# Patient Record
Sex: MALE | Race: WHITE | Employment: OTHER | URBAN - METROPOLITAN AREA
[De-identification: names, ages, dates, MRNs, and addresses within clinical notes are randomized per-mention and may not be internally consistent; named-entity substitution may affect disease eponyms.]

---

## 2018-12-13 ENCOUNTER — OFFICE VISIT (OUTPATIENT)
Dept: ORTHOPEDIC SURGERY | Age: 78
End: 2018-12-13
Payer: MEDICARE

## 2018-12-13 VITALS — HEIGHT: 72 IN | BODY MASS INDEX: 26.41 KG/M2 | WEIGHT: 195 LBS

## 2018-12-13 DIAGNOSIS — M17.12 ARTHRITIS OF LEFT KNEE: ICD-10-CM

## 2018-12-13 DIAGNOSIS — M25.562 LEFT KNEE PAIN, UNSPECIFIED CHRONICITY: Primary | ICD-10-CM

## 2018-12-13 PROCEDURE — L3170 FOOT PLAS HEEL STABI PRE OTS: HCPCS | Performed by: ORTHOPAEDIC SURGERY

## 2018-12-13 PROCEDURE — 99203 OFFICE O/P NEW LOW 30 MIN: CPT | Performed by: ORTHOPAEDIC SURGERY

## 2018-12-13 NOTE — PROGRESS NOTES
MD Phill Massey PA-C         Orthopaedic Surgery and Sports Medicine        Chief Complaint:  Knee Pain (left knee)      History of Present Illness:  Junnie Kussmaul is a 70 y.o. male here regarding left knee pain. The pain is located medial.   Patient feels this discomfort may be related to a known injury No    The patient describes the symptoms as aching and sharp. Symptoms improve with rest.     The symptoms are worse with activity, stair climbing, kneeling, deep knee bending, getting up from a chair, weight bearing. Discomfort has been progressive over time. Sleeping habits related to chief complaint:No    Outside reports reviewed: none. Medical History:  Patient's medications, allergies, past medical, surgical, social and family histories were reviewed and updated as appropriate. Past Medical History:   Diagnosis Date    Hypertension        Past Surgical History:   Procedure Laterality Date    COLONOSCOPY      COLONOSCOPY  10/24/2013       No family history on file. Social History     Social History    Marital status:      Spouse name: N/A    Number of children: N/A    Years of education: N/A     Social History Main Topics    Smoking status: Never Smoker    Smokeless tobacco: Never Used    Alcohol use 1.8 oz/week     3 Glasses of wine per week    Drug use: Unknown    Sexual activity: Not Asked     Other Topics Concern    None     Social History Narrative    None       Current Outpatient Prescriptions   Medication Sig Dispense Refill    benazepril (LOTENSIN) 10 MG tablet Take 10 mg by mouth daily.  amLODIPine (NORVASC) 10 MG tablet Take 10 mg by mouth daily. No current facility-administered medications for this visit.         No Known Allergies       Review of Systems:   Pertinent items are noted in HPI  Review of systems reviewed from Patient History Form dated on

## 2019-01-14 ENCOUNTER — OFFICE VISIT (OUTPATIENT)
Dept: ORTHOPEDIC SURGERY | Age: 79
End: 2019-01-14
Payer: MEDICARE

## 2019-01-14 VITALS — BODY MASS INDEX: 26.41 KG/M2 | HEIGHT: 72 IN | WEIGHT: 195 LBS

## 2019-01-14 DIAGNOSIS — M17.12 ARTHRITIS OF LEFT KNEE: Primary | ICD-10-CM

## 2019-01-14 PROCEDURE — 99213 OFFICE O/P EST LOW 20 MIN: CPT | Performed by: ORTHOPAEDIC SURGERY

## 2019-01-21 ENCOUNTER — OFFICE VISIT (OUTPATIENT)
Dept: ORTHOPEDIC SURGERY | Age: 79
End: 2019-01-21
Payer: MEDICARE

## 2019-01-21 VITALS — WEIGHT: 195.11 LBS | HEIGHT: 72 IN | BODY MASS INDEX: 26.43 KG/M2

## 2019-01-21 DIAGNOSIS — M19.019 SHOULDER ARTHRITIS: Primary | ICD-10-CM

## 2019-01-21 PROCEDURE — 99203 OFFICE O/P NEW LOW 30 MIN: CPT | Performed by: ORTHOPAEDIC SURGERY

## 2019-05-06 ENCOUNTER — OFFICE VISIT (OUTPATIENT)
Dept: ORTHOPEDIC SURGERY | Age: 79
End: 2019-05-06
Payer: MEDICARE

## 2019-05-06 VITALS — HEIGHT: 72 IN | BODY MASS INDEX: 26.41 KG/M2 | WEIGHT: 195 LBS

## 2019-05-06 DIAGNOSIS — M17.12 ARTHRITIS OF LEFT KNEE: Primary | ICD-10-CM

## 2019-05-06 PROCEDURE — L3170 FOOT PLAS HEEL STABI PRE OTS: HCPCS | Performed by: ORTHOPAEDIC SURGERY

## 2019-05-06 PROCEDURE — 99213 OFFICE O/P EST LOW 20 MIN: CPT | Performed by: ORTHOPAEDIC SURGERY

## 2019-05-06 NOTE — PROGRESS NOTES
Smokeless tobacco: Never Used   Substance and Sexual Activity    Alcohol use: Yes     Alcohol/week: 1.8 oz     Types: 3 Glasses of wine per week    Drug use: None    Sexual activity: None   Lifestyle    Physical activity:     Days per week: None     Minutes per session: None    Stress: None   Relationships    Social connections:     Talks on phone: None     Gets together: None     Attends Gnosticist service: None     Active member of club or organization: None     Attends meetings of clubs or organizations: None     Relationship status: None    Intimate partner violence:     Fear of current or ex partner: None     Emotionally abused: None     Physically abused: None     Forced sexual activity: None   Other Topics Concern    None   Social History Narrative    None       Current Outpatient Medications   Medication Sig Dispense Refill    benazepril (LOTENSIN) 10 MG tablet Take 10 mg by mouth daily.  amLODIPine (NORVASC) 10 MG tablet Take 10 mg by mouth daily. No current facility-administered medications for this visit. No Known Allergies       Review of Systems:   Pertinent items are noted in HPI  Review of systems reviewed from Patient History Form dated on 12/13/2018 and available in the patient's chart under the Media tab. Vital Signs: There were no vitals filed for this visit. Pain Assessment:          General Exam:   Constitutional: Patient is adequately groomed with no evidence of malnutrition  Mental Status: The patient is oriented to time, place and person. The patient's mood and affect are appropriate. Lymphatic: The lymphatic examination bilaterally reveals all areas to be without enlargement or induration. Vascular: Examination reveals no swelling or calf tenderness. Peripheral pulses are palpable and 2+. Neurological: The patient has good coordination. There is no weakness or sensory deficit.     Left Knee Examination  Inspection:   Knee alignment: significant specifically his hamstring. He lost one of his lateral heel wedges we'll get him a new pair today. He'll follow-up with us on a p.r.n. Basis    Non-steroidal anti-inflammatories medications (NSAIDs) can be used to assist with pain control and to reduce inflammatory changes. These medications may be over-the-counter or prescribed. We discussed taking the NSAID properly and the precautions. The patient understands that this medication may potentially interfere with other medications. Patient was also instructed to immediately discontinue the medication is there is any possible complication. Aniceto Jolley was instructed to call the office if his symptoms worsen or if new symptoms appear prior to the next scheduled visit. He is specifically instructed to contact the office between now and schedule appointment if he has concerns related to his condition or if he needs assistance in scheduling any above tests. He is welcome to call for an appointment sooner if he has any additional concerns or questions. Chuck Rider PA-C, scribing for and in the presence of Dr. Gallito Lopez   5/6/2019 9:27 AM    I have evaluated the patient myself and completed the examination of the patient on date of visit. I have discussed the case and reviewed all pertinent data with the Chuck OSUNA, and agree with the plan noted above. Dr. Gallito Lopez MD        This dictation was performed with a verbal recognition program [facility]) and it was checked for errors. It is possible that there are still dictated errors within this office note. If so, please bring any errors to my attention for an addendum. All efforts were made to ensure that this office note is accurate.

## 2019-10-08 ENCOUNTER — OFFICE VISIT (OUTPATIENT)
Dept: ORTHOPEDIC SURGERY | Age: 79
End: 2019-10-08
Payer: OTHER GOVERNMENT

## 2019-10-08 VITALS — HEIGHT: 72 IN | BODY MASS INDEX: 26.43 KG/M2 | WEIGHT: 195.11 LBS

## 2019-10-08 DIAGNOSIS — R52 PAIN: Primary | ICD-10-CM

## 2019-10-08 PROCEDURE — 99204 OFFICE O/P NEW MOD 45 MIN: CPT | Performed by: ORTHOPAEDIC SURGERY

## 2019-10-08 PROCEDURE — L1902 AFO ANKLE GAUNTLET PRE OTS: HCPCS | Performed by: ORTHOPAEDIC SURGERY

## 2019-10-15 ENCOUNTER — HOSPITAL ENCOUNTER (OUTPATIENT)
Dept: PHYSICAL THERAPY | Age: 79
Setting detail: THERAPIES SERIES
Discharge: HOME | End: 2019-10-15
Payer: OTHER GOVERNMENT

## 2019-10-15 PROCEDURE — 97161 PT EVAL LOW COMPLEX 20 MIN: CPT | Performed by: SPECIALIST

## 2019-10-15 PROCEDURE — 97110 THERAPEUTIC EXERCISES: CPT | Performed by: SPECIALIST

## 2019-10-22 ENCOUNTER — HOSPITAL ENCOUNTER (OUTPATIENT)
Dept: PHYSICAL THERAPY | Age: 79
Setting detail: THERAPIES SERIES
Discharge: HOME | End: 2019-10-22
Payer: OTHER GOVERNMENT

## 2019-10-22 PROCEDURE — 97110 THERAPEUTIC EXERCISES: CPT | Performed by: SPECIALIST

## 2019-10-22 PROCEDURE — 97112 NEUROMUSCULAR REEDUCATION: CPT | Performed by: SPECIALIST

## 2019-10-24 ENCOUNTER — APPOINTMENT (OUTPATIENT)
Dept: PHYSICAL THERAPY | Age: 79
End: 2019-10-24
Payer: OTHER GOVERNMENT

## 2019-10-29 ENCOUNTER — HOSPITAL ENCOUNTER (OUTPATIENT)
Dept: PHYSICAL THERAPY | Age: 79
Setting detail: THERAPIES SERIES
Discharge: HOME | End: 2019-10-29
Payer: OTHER GOVERNMENT

## 2019-10-29 PROCEDURE — 97112 NEUROMUSCULAR REEDUCATION: CPT | Performed by: SPECIALIST

## 2019-10-29 PROCEDURE — 97110 THERAPEUTIC EXERCISES: CPT | Performed by: SPECIALIST

## 2019-10-31 ENCOUNTER — APPOINTMENT (OUTPATIENT)
Dept: PHYSICAL THERAPY | Age: 79
End: 2019-10-31
Payer: OTHER GOVERNMENT

## 2019-11-05 ENCOUNTER — APPOINTMENT (OUTPATIENT)
Dept: PHYSICAL THERAPY | Age: 79
End: 2019-11-05
Payer: OTHER GOVERNMENT

## 2019-11-06 ENCOUNTER — HOSPITAL ENCOUNTER (OUTPATIENT)
Dept: PHYSICAL THERAPY | Age: 79
Setting detail: THERAPIES SERIES
Discharge: HOME | End: 2019-11-06
Payer: OTHER GOVERNMENT

## 2019-11-06 PROCEDURE — 97110 THERAPEUTIC EXERCISES: CPT | Performed by: SPECIALIST

## 2019-11-06 PROCEDURE — 97112 NEUROMUSCULAR REEDUCATION: CPT | Performed by: SPECIALIST

## 2019-11-07 ENCOUNTER — APPOINTMENT (OUTPATIENT)
Dept: PHYSICAL THERAPY | Age: 79
End: 2019-11-07
Payer: OTHER GOVERNMENT

## 2020-07-08 ENCOUNTER — OFFICE VISIT (OUTPATIENT)
Dept: ORTHOPEDIC SURGERY | Age: 80
End: 2020-07-08
Payer: MEDICARE

## 2020-07-08 VITALS — HEIGHT: 72 IN | WEIGHT: 195 LBS | BODY MASS INDEX: 26.41 KG/M2

## 2020-07-08 PROCEDURE — 99213 OFFICE O/P EST LOW 20 MIN: CPT | Performed by: ORTHOPAEDIC SURGERY

## 2020-07-08 NOTE — PROGRESS NOTES
MD Kristen Nath, [place]         Orthopaedic Surgery and Sports Medicine    Patient Name: Dustin Unger  YOB: 1940  Patient's PCP is Steven Espana MD    SUBJECTIVE  Chief Complaint:  Knee Pain (LEFT, INCREASED PAIN BEHIND THE KNEE, STAIRS ARE PAINFUL, NO MEDS)      History of Present Illness:  Dustin Unger is a 70 y.o. male here regarding left knee pain. The pain began several years ago but has recently worsened. There was not a history of injury. He reports if he is up on his feet for over 3 hours he has to sit down and take a break. He is able to return to his activity usually 30 minutes later. The patient is currently ambulating independently      Location: global  Quality: aching  Pain Scale: 4/10  Context: overall course is worsening   Alleviating Factors: rest, avoiding painful activities  Exacerbating Factors: activity, weight bearing  Associated Symptoms: swelling    Sleep pattern is affected by the chief complaint: No  The patient has not had PT. The patient has not had an injection. The patient has taken NSAIDs and is not interested in taking \"pain medications\" as he doesn't want to mask the pain. The patient is not working, he is retired. Patient has had a history of a meniscal tear that was treated nonoperatively in this knee. He reports he is done some research on the Internet and thinks he may have a Baker's cyst and is interested in an MRI scan today. Outside reports reviewed: historical medical records.     Pain Assessment:  Pain Assessment  Location of Pain: Knee  Location Modifiers: Left  Severity of Pain: 4  Quality of Pain: Dull, Aching, Popping  Frequency of Pain: Intermittent  Aggravating Factors: Walking, Standing, Squatting, Kneeling, Stairs, Bending  Relieving Factors: Rest  Result of Injury: No  Work-Related Injury: No  Are there other pain locations you wish to document?: No    Review of Systems:  Ardena Spatz Novak's review of systems has been performed by intake and observation. All past and current ROS forms have been scanned into the medical record. He has been instructed to contact his primary care provider regarding ROS issues if not already being addressed at this time. There are no recent changes. The most recent ROS was scanned into media on 07/08/2020    Past Medical History:  (see most recent intake form scanned into media on above date)  Past Medical History:   Diagnosis Date    Hypertension        Past Surgical History:  (see most recent intake form scanned into media on above date)  Past Surgical History:   Procedure Laterality Date    COLONOSCOPY      COLONOSCOPY  10/24/2013       Allergies:  (see most recent intake form scanned into media on above date)  No Known Allergies    Medications:  (see most recent intake form scanned into media on above date)  Current Outpatient Medications   Medication Sig Dispense Refill    benazepril (LOTENSIN) 10 MG tablet Take 10 mg by mouth daily.  amLODIPine (NORVASC) 10 MG tablet Take 10 mg by mouth daily. No current facility-administered medications for this visit. OBJECTIVE  PHYSICAL EXAM  Vital Signs: There were no vitals filed for this visit. Body mass index is 26.45 kg/m². General Appearance: Patient is adequately groomed with no evidence of malnutrition   Orientation: Patient is alert and oriented to person, place and time  Mood and Affect: Neutral/Euthymic(normal)  Gait and Station: antalgic. The patient can bear weight on the extremity. Left Knee Examination  Inspection:  Knee alignment: mild varus           mild swelling noted. No erythema or ecchymosis. Palpation: no tenderness to palpation on the global region. no effusion noted. Range of Motion:  full    Stability and Special Testing:  Shanae's test: negative             Laxity with varus stress testing: negative             Laxity with valgus stress testing: negative    Strength: No gross motor weakness noted. All muscle groups appear to be functioning. Motor exam of the lower extremities show quadriceps, hamstrings, foot dorsiflexion and plantarflexion grossly intact. Neurologic: Sensation to both feet is grossly intact to light touch. Vascular: The bilateral lower extremities are warm and well-perfused with brisk capillary refill. Lymphatic: The lymphatic examination bilaterally reveals all areas to be without enlargement or induration    Skin: intact with no cellulitis, rashes, ulcerations, lymphedema or cutaneous lesions noted. Additional Examinations:  Right Lower Extremity: Examination of the right lower extremity does not show any tenderness or injury, but also has a mild varus deformity. Range of motion is within normal limits. There is no gross instability. There are no rashes, ulcerations or lesions. Strength and tone are normal.      DIAGNOSTICS  Xrays obtained in office today: Yes  Xrays reviewed today: Yes  4 views of the left knee show   Fracture: No   Dislocation: No  Patellofemoral arthritis: severe  Medial compartment: severe  Lateral compartment: moderate  Varus deformity: Yes  Valgus deformity: No      ASSESSMENT (Medical Decision Making)    Akilah Ramirez is a 70 y.o. male with the following diagnosis: Left knee osteoarthritis      ICD-10-CM    1. Left knee pain, unspecified chronicity M25.562 XR KNEE LEFT (MIN 4 VIEWS)   2. Arthritis of left knee M17.12            PLAN (Medical Decision Making)  Office Procedures:  Orders Placed This Encounter   Procedures    XR KNEE LEFT (MIN 4 VIEWS)     Standing Status:   Future     Number of Occurrences:   1     Standing Expiration Date:   7/8/2021       Treatment Plan:   I discussed the diagnosis and treatment options with Akilah Ramirez today.   At this time we suggested the patient start a regular anti-inflammatory however he is not interested in taking any medication. He has had bad side effects from his blood pressure medicine and is not interested in doing with any side effects of any other medication. We discussed the possibility of a total knee arthroplasty however he is not interested in that either. Reports he has had some friends with poor outcomes and is not interested in surgical intervention. We then advised the patient to somewhat modify his activity to do things that do not aggravate his knee. He can take frequent breaks and will follow-up with us on a as needed basis     I spent at least 15 minutes face to face with this patient today. Greater than 50% of that time was spent counseling, coordinating care, discussing and answering questions regarding the risks, benefits, and complications of left knee arthritis in detail. We discussed precautionary measures to prevent further injury, additional treatment options, including both operative and non-operative treatments. Dee Aguilar was instructed to call the office if his symptoms worsen or if new symptoms appear prior to the next scheduled visit. He is specifically instructed to contact the office between now and schedule appointment if he has concerns related to his condition or if he needs assistance in scheduling any above tests. He is welcome to call for an appointment sooner if he has any additional concerns or questions. Marisol Caballero PA-C, scribing for and in the presence of Dr. Americo Torres   7/8/2020 4:06 PM      I, Dr. Jean-Pierre Sol, personally performed the services described in this documentation as scribed by Ignacia Palacios. SHAINA Llanes in my presence, and it is both accurate and complete. Jean-Pierre Sol MD  This dictation was performed with a verbal recognition program [facility]) and it was checked for errors. It is possible that there are still dictated errors within this office note.  If so, please bring any errors to my attention for an

## 2020-07-23 ENCOUNTER — OFFICE VISIT (OUTPATIENT)
Dept: ORTHOPEDIC SURGERY | Age: 80
End: 2020-07-23
Payer: MEDICARE

## 2020-07-23 VITALS — HEIGHT: 72 IN | WEIGHT: 195.11 LBS | TEMPERATURE: 97.3 F | BODY MASS INDEX: 26.43 KG/M2

## 2020-07-23 PROCEDURE — 99204 OFFICE O/P NEW MOD 45 MIN: CPT | Performed by: ORTHOPAEDIC SURGERY

## 2020-07-23 NOTE — PROGRESS NOTES
[address]  History and Physical  Knee Pain    Date:  2020    Name:  Jigar Cody  Address:  [address]  [place] [address]    :  1940      Age:   70 y.o.    SSN:  [id number]      Medical Record Number:  <[id number]>      Chief Complaint    New Patient (left knee )      History of Present Illness:  Jigar Cody is a 70 y.o. male who presents for a second opinion regarding his left knee pain. Patient notes past medical history of a meniscus tear within the left knee approximately 10 years ago. He noted pain and mechanical symptoms at that time but this was treated conservatively with physical therapy. Patient presents today due to a worsening of his baseline osteoarthritic pain that he has had over the past several years. Patient mostly notes pain in the patellofemoral joint and mild pain within the popliteal fossa joint line after prolonged periods of standing. He states that he can only stand/ambulate for approximately an hour and a half before he is required to sit down for a brief rest after which time he can once again walk for about another 30 minutes. He denies any mechanical symptoms. Patient has treated his baseline osteoarthritic pain with conservative therapy thus far. This conservative therapy includes: rest, ice, elevation, bracing, home exercises, activity modification, and NSAIDs as needed. Patient does not like to take over-the-counter pain medication and attempt to shot away from NSAIDs due to hypertension. In addition, the patient has noted increased left foot and left hip pain over the past several years. He has seen a podiatrist for his left foot pain in the past however despite conservative therapy continues to exhibit pain along the plantar fascia as well as the fourth and fifth metatarsal.  The patient denies any new injury. The patient is retired.   The patient denies any catching, giving way, joint locking, numbness, paresthesias, or weakness. Pain Assessment  Location of Pain: Knee  Location Modifiers: Left  Severity of Pain: 2  Quality of Pain: Sharp  Duration of Pain: Persistent  Frequency of Pain: Constant  Aggravating Factors: Standing, Stairs  Relieving Factors: Rest  Result of Injury: No  Work-Related Injury: No  Are there other pain locations you wish to document?: No    Past Medical History:   Diagnosis Date    Hypertension         Past Surgical History:   Procedure Laterality Date    COLONOSCOPY      COLONOSCOPY  10/24/2013       No family history on file. Social History     Socioeconomic History    Marital status:      Spouse name: None    Number of children: None    Years of education: None    Highest education level: None   Occupational History    None   Social Needs    Financial resource strain: None    Food insecurity     Worry: None     Inability: None    Transportation needs     Medical: None     Non-medical: None   Tobacco Use    Smoking status: Never Smoker    Smokeless tobacco: Never Used   Substance and Sexual Activity    Alcohol use:  Yes     Alcohol/week: 3.0 standard drinks     Types: 3 Glasses of wine per week    Drug use: None    Sexual activity: None   Lifestyle    Physical activity     Days per week: None     Minutes per session: None    Stress: None   Relationships    Social connections     Talks on phone: None     Gets together: None     Attends [employer] service: None     Active member of club or organization: None     Attends meetings of clubs or organizations: None     Relationship status: None    Intimate partner violence     Fear of current or ex partner: None     Emotionally abused: None     Physically abused: None     Forced sexual activity: None   Other Topics Concern    None   Social History Narrative    None       Current Outpatient Medications   Medication Sig Dispense Refill    benazepril (LOTENSIN) 10 MG tablet Take 10 mg by mouth daily.      amLODIPine (NORVASC) 10 MG tablet Take 10 mg by mouth daily. No current facility-administered medications for this visit. No Known Allergies      Review of Systems:  A 14 point review of systems was completed by the patient and is available in the media section of the scanned medical record and was reviewed on 7/23/2020. The review is negative with the exception of those things mentioned in the HPI and Past Medical History   Review of Systems   Musculoskeletal: Positive for arthralgias and myalgias. Neurological: Negative for weakness and numbness. Vital Signs:   Temp 97.3 °F (36.3 °C) (Infrared)   Ht 6' 0.01\" (1.829 m)   Wt 195 lb 1.7 oz (88.5 kg)   BMI 26.46 kg/m²     General Exam:    General: Chad Zelaya is a healthy and well appearing 70y.o. year old male who is sitting comfortably in our office in no acute distress. Neuro: Alert & Oriented x 3,  normal,  no focal deficits noted. Normal mood & affect. Eyes: Sclera clear  Ears: Normal external ear  Mouth: Patient wearing a mask  Pulm: Respirations unlabored and regular   Skin: Warm, well perfused      Knee Examination: Left    Inspection:  No effusion, ecchymosis, discoloration, erythema, excessive warmth. no gross deformities, no signs of infection. Palpation: There is moderate patellofemoral crepitus, there is no joint line tenderness. No palpable Baker's cyst or tenderness to palpation in the popliteal fossa. No other osseous or soft tissue tenderness around the knee. Negative calf tenderness    Range of Motion:   0-130 degrees without pain or difficulty. Appropriate patellar mobility    Strength: Grossly intact    Special Tests: No ligament instability, valgus and varus stress test are stable at 0 and 30°. Lachman's exhibits a solid endpoint. Negative posterior drawer.   Negative Homans sign    Gait: Varus thrust, Non antalgic, without the use of assistive devices    Alignment: Varus deformity    Neuro: Sensation equal bilateral lower extremities    Vascular: 2+ posterior tibialis pulse          Radiology:   Previously obtain x-ray imaging reviewed in the office today. X-rays obtained and reviewed in office: AP and PA view of both knees with a merchant and lateral view of the left knee. Impression: No fractures, dislocations, visible tumors, or signs of acute trauma. Varus alignment to both knees. Patient exhibits decreased joint space in the medial and lateral femoral-tibial compartments bilaterally. Medial tibiofemoral compartment of both knees appears to be bone-on-bone. Teresia Galatia grade 3. Patient has decreased joint spacing in the patellofemoral joints. Patella is riding appropriately in the trochlear groove with no subluxation or tilt noted. No loose bodies or foreign bodies. Assessment: Patient is a 70 y.o. male presenting to the office for a second opinion on his left knee pain. Patient has a diagnosis of osteoarthritis in both knees. Visit Diagnoses       Codes    Arthritis of left knee    -  Primary M17.12    Congenital varus deformity of left knee     Q74.1    Left knee pain, unspecified chronicity     M25.562            Medical decision making:  Patient's workup and evaluation were reviewed with the patient in the office today. Patient's previously obtain x-ray imaging was thoroughly reviewed with him in the office today. Given the patient's history and physical exam the patient is suffering from moderate to severe osteoarthritis in both knees, left worse than right. Patient was educated on conservative versus surgical treatment for his condition as well as the risks and benefits of both.   At this time, given the patient's history physical exam as well as corroboration with x-ray imaging I believe the real degree of medical certainty that the most appropriate course of treatment for this patient would be for him to undergo a left total knee arthroplasty within the next year. Given that he is bone-on-bone and I do not believe cortisone or Visco supplementation injections would be very helpful as once the patient has reached bone-on-bone arthritis there only approximately 50% effective. Furthermore, the varus angulation to the patient's knee puts him at risk for further collapse of the joint which would make a total knee arthroplasty in the distant future drastically more difficult. This was also conveyed to the patient. Conservative protocol was discussed with the patient. We offered to give him a corticosteroid injection the office today with the caveat that we cannot perform any surgery after a cortisone injection due to the increased risk of infection. The patient deferred as he would like to plan for a left total knee arthroplasty in the near future. Patient was given literature and information on total knee arthroplasties. He was educated on conservative therapy that he can utilize until he decides on a surgery date. This is detailed in the treatment plan below. All the patient's questions were answered while in the clinic. The patient is understanding of all instructions and agrees with the plan. Patient does not smoke and did not require smoking cessation patient education. +45 minutes was spent in the evaluation education and thorough discussion with this patient who requested a second opinion after being seen by another orthopedic surgeon. All of his questions were answered this did represent a more complex evaluation      Treatment Plan:    1. Surgical optimization  2. Lower extremity stretches  3. Activity modification  4. Ice 20 minutes ever 1-2 hours PRN  5. NSAIDs as needed  6. Rest   7. Elevation at least 2 inches above the heart with pillows supporting the joint underneath  8. Lightweight exercise/low impact exercise  9.  Appropriate diet/weight management      Follow up: PRN      Approximately 30 minutes was spent on reviewing past medical records, reviewing imaging, patient education and coordinating care. Over 50% at this time was spent face-to-face with the patient. Aleksander Willson PA-C    Physician Assistant - Certified  [address]    07/23/20 11:47 AM    During this examination, I, Chilango Rey Stageeros, functioned as a scribe for Dr. Neida Ace. This dictation was performed with a verbal recognition program (DRAGON) and it was checked for errors. It is possible that there are still dictated errors within this office note. If so, please bring any errors to my attention for an addendum. All efforts were made to ensure that this office note is accurate. This dictation was performed with a verbal recognition program (DRAGON) and it was checked for errors. It is possible that there are still dictated errors within this office note. If so, please bring any errors to my attention for an addendum. All efforts were made to ensure that this office note is accurate. Supervising Physician Attestation:  I, Dr. Neida Ace, personally performed the services described in this documentation as scribed above, and it is both accurate and complete and I agree with all pertinent clinical information. I personally interviewed the patient and performed a physical examination and medical decision making. I discussed the patient's condition and treatment options and have  reviewed and agree with the past medical, family and social history unless otherwise noted. All of the patient's questions were answered.       Board Certified Orthopaedic Surgeon  [address] [facility] and [address] [place] [address] and [facility] and [employer]  Professor of [address]

## 2020-09-14 ENCOUNTER — TELEPHONE (OUTPATIENT)
Dept: ORTHOPEDIC SURGERY | Age: 80
End: 2020-09-14

## 2020-09-14 NOTE — TELEPHONE ENCOUNTER
Jocelyn Salmeron [id number]    Date: 10/14/20  Type of SX:  OUTPATIENT  Location: [address] [place] [address]  CPT: 82340   DX Code: M17.12  SX area: L KNEE  Insurance: [facility]

## 2020-09-18 ENCOUNTER — HOSPITAL ENCOUNTER (OUTPATIENT)
Dept: GENERAL RADIOLOGY | Age: 80
Discharge: HOME | End: 2020-09-18
Payer: MEDICARE

## 2020-09-18 PROCEDURE — 77073 BONE LENGTH STUDIES: CPT

## 2020-10-05 NOTE — PROGRESS NOTES
PT RETURNED CALL, H/P 10-6. BY PCP Alfreida Layer, [address] PT HAS PREOP ORDERS, COVID 10-8 JH.  CR

## 2020-10-06 ENCOUNTER — NURSE ONLY (OUTPATIENT)
Dept: PRIMARY CARE CLINIC | Age: 80
End: 2020-10-06
Payer: MEDICARE

## 2020-10-06 DIAGNOSIS — Z01.818 PRE-OPERATIVE CLEARANCE: ICD-10-CM

## 2020-10-06 LAB
ALBUMIN SERPL-MCNC: 4.4 G/DL (ref 3.4–5)
ALP BLD-CCNC: 59 U/L (ref 40–129)
ALT SERPL-CCNC: 18 U/L (ref 10–40)
ANION GAP SERPL CALCULATED.3IONS-SCNC: 11 MMOL/L (ref 3–16)
APTT: 38.2 SEC (ref 24.2–36.2)
AST SERPL-CCNC: 23 U/L (ref 15–37)
BILIRUB SERPL-MCNC: 0.4 MG/DL (ref 0–1)
BILIRUBIN DIRECT: <0.2 MG/DL (ref 0–0.3)
BILIRUBIN, INDIRECT: NORMAL MG/DL (ref 0–1)
BUN BLDV-MCNC: 17 MG/DL (ref 7–20)
CALCIUM SERPL-MCNC: 9.7 MG/DL (ref 8.3–10.6)
CHLORIDE BLD-SCNC: 102 MMOL/L (ref 99–110)
CO2: 25 MMOL/L (ref 21–32)
CREAT SERPL-MCNC: 0.9 MG/DL (ref 0.8–1.3)
GFR AFRICAN AMERICAN: >60
GFR NON-AFRICAN AMERICAN: >60
GLUCOSE BLD-MCNC: 95 MG/DL (ref 70–99)
HCT VFR BLD CALC: 45.5 % (ref 40.5–52.5)
HEMOGLOBIN: 15.1 G/DL (ref 13.5–17.5)
INR BLD: 1.02 (ref 0.86–1.14)
MCH RBC QN AUTO: 30.6 PG (ref 26–34)
MCHC RBC AUTO-ENTMCNC: 33.2 G/DL (ref 31–36)
MCV RBC AUTO: 92.2 FL (ref 80–100)
PDW BLD-RTO: 13.8 % (ref 12.4–15.4)
PLATELET # BLD: 204 K/UL (ref 135–450)
PMV BLD AUTO: 8.2 FL (ref 5–10.5)
POTASSIUM SERPL-SCNC: 4.2 MMOL/L (ref 3.5–5.1)
PREALBUMIN: 25.2 MG/DL (ref 20–40)
PROTHROMBIN TIME: 11.8 SEC (ref 10–13.2)
RBC # BLD: 4.94 M/UL (ref 4.2–5.9)
SODIUM BLD-SCNC: 138 MMOL/L (ref 136–145)
TOTAL PROTEIN: 7.3 G/DL (ref 6.4–8.2)
WBC # BLD: 7.1 K/UL (ref 4–11)

## 2020-10-06 PROCEDURE — 99211 OFF/OP EST MAY X REQ PHY/QHP: CPT | Performed by: NURSE PRACTITIONER

## 2020-10-06 NOTE — PROGRESS NOTES
[facility] / [facility] [place] ([facility]) [address] [facility], [address]    Acknowledgment of Informed Consent for Surgical or Medical Procedure and Sedation  I agree to allow doctor(s) Regine Zhou and his/her associates or assistants, including residents and/or other qualified medical practitioner to perform the following medical treatment or procedure and to administer or direct the administration of sedation as necessary:  Procedure(s): LEFT TOTAL KNEE REPLACEMENT  My doctor has explained the following regarding the proposed procedure:   the explanation of the procedure   the benefits of the procedure   the potential problems that might occur during recuperation   the risks and side effects of the procedure which could include but are not limited to severe blood loss, infection, stroke or death   the benefits, risks and side effect of alternative procedures including the consequences of declining this procedure or any alternative procedures   the likelihood of achieving satisfactory results. I acknowledge no guarantee or assurance has been made to me regarding the results. I understand that during the course of this treatment/procedure, unforeseen conditions can occur which require an additional or different procedure. I agree to allow my physician or assistants to perform such extension of the original procedure as they may find necessary. I understand that sedation will often result in temporary impairment of memory and fine motor skills and that sedation can occasionally progress to a state of deep sedation or general anesthesia. I understand the risks of anesthesia for surgery include, but are not limited to, sore throat, hoarseness, injury to face, mouth, or teeth; nausea; headache; injury to blood vessels or nerves; death, brain damage, or paralysis.     I understand that if I have a Limitation of Treatment order in effect during my hospitalization, the order may or may not be in

## 2020-10-07 LAB — VITAMIN D 25-HYDROXY: 31.8 NG/ML

## 2020-10-08 LAB — SARS-COV-2, NAA: NOT DETECTED

## 2020-10-08 NOTE — PROGRESS NOTES
Spoke w/ patient and requested to have Covid done within time frame needed for OR 10/14. Unable to do today but will repeat on 10/9 at either [facility] or [facility].  Hours given for Praveen Mo flu tent/ag

## 2020-10-08 NOTE — RESULT ENCOUNTER NOTE
Your Covid-10 teste resulted not detected/negative. What happens if I have a negative test?    Remember to wash your hands often, avoid touching your face, stay 6 feet from people you do not live with, and wear a cloth facemask when you go out in public. A negative COVID-19 test at one point in time does not mean you will stay negative. You could become ill with COVID-19 and/or test positive at any time. If you are a close contact of a confirmed or suspected case, continue to stay home and away from others until 14 days after your last exposure. If you do not have symptoms, and were not in close contact with a confirmed or suspected case, you can stop isolating. If you currently have symptoms of COVID-19, and were not in close contact with a confirmed or suspected case, you should keep monitoring symptoms and talk to your doctor or other healthcare provider about staying home and if you need to get tested again. If you develop symptoms of COVID-19, stay at home and away from others and talk to your doctor or other healthcare provider about getting tested again. For additional information, visit coronavirus. [place].gov. For answers to your COVID-19 questions, call 4-935-3-ASK- ([phone]).

## 2020-10-09 NOTE — PROGRESS NOTES
[facility] PRE-SURGICAL TESTING INSTRUCTIONS                              PRIOR TO PROCEDURE DATE:  1. Please follow any guidelines/instructions prior to your procedure as advised by your surgeon. 2. Arrange for someone to drive you home and be with you for the first 24 hours after discharge for your safety after your procedure for which you received sedation. Ensure it is someone we can share information with regarding your discharge. 3. You must contact your surgeon for instructions IF:   You are taking any blood thinners, aspirin, anti-inflammatory or vitamin E.   There is a change in your physical condition such as a cold, fever, rash, cuts, sores or any other infection, especially near your surgical site. 4. Do not drink alcohol the day before or day of your procedure. 5. A Pre-op History and Physical for surgery MUST be completed by your Physician or Urgent Care within 30 days of your procedure date. Please bring a copy with you on the day of your procedure and along with any other testing performed. THE DAY OF YOUR PROCEDURE:  1. Follow instructions for ARRIVAL TIME as DIRECTED BY YOUR SURGEON. I    2. Enter the MAIN entrance from [employer] and follow the signs to the free [employer] or [employer] parking (offered free of charge 6am-5pm). 3. Enter the Main Entrance of the hospital (do not enter from the lower level of the parking garage). Upon entrance, check in with the  at the main desk on your left. If no one is available at the desk, proceed into the [facility] Waiting Room and go through the door directly into the [facility]. There is a Check-in desk ACROSS from Room 5 (marked with a sign hanging from the ceiling). The phone number for the surgery center is [phone]. 4. Please call [phone] option #2 option #2 if you have not been preregistered yet. On the day of your procedure bring your insurance card and photo ID.  You will be registered at your bedside once brought back to your room. 5. DO NOT EAT ANYTHING eight hours prior to surgery. May have 8 ounces of water 4 hours prior to surgery. 6. MEDICATIONS    Take the following medications with a SMALL sip of water:    Use your usual dose of inhalers the morning of surgery. BRING your rescue inhaler with you to hospital.    Anesthesia does NOT want you to take insulin the morning of surgery. They will control your blood sugar while you are at the hospital. Please contact your ordering physician for instructions regarding your insulin the night before your procedure. If you have an insulin pump, please keep it set on basal rate. 7. Do not swallow water when brushing teeth. No gum, candy, mints or ice chips. Refrain from smoking or at least decrease the amount. 8. Dress in loose, comfortable clothing appropriate for redressing after your procedure. Do not wear jewelry (including body piercings), make-up (especially NO eye make-up), fingernail polish (NO toenail polish if foot/leg surgery), lotion, powders or metal hairclips. 9. Dentures, glasses, or contacts will need to be removed before your procedure. Bring cases for your glasses, contacts, dentures, or hearing aids to protect them while you are in surgery. 10. If you use a CPAP, please bring it with you on the day of your procedure. 11. We recommend that valuable personal  belongings such as cash, cell phones, e-tablets or jewelry, be left at home during your stay. The hospital will not be responsible for valuables that are not secured in the hospital safe. However, if your insurance requires a co-pay, you may want to bring a method of payment, i.e. Check or credit card, if you wish to pay your co-pay the day of surgery. 12. If you are to stay overnight, you may bring a bag with personal items.  Please have any large items you may need brought in by your family after your arrival to your hospital potential side effects. 2. For comfort and safety, arrange to have someone at home with you for the first 24 hours after discharge. 3. You and your family will be given written instructions about your diet, activity, dressing care, medications, and return visits. 4. Once at home, should issues with nausea, pain, or bleeding occur, or should you notice any signs of infection, you should call your surgeon. 5. Always clean your hands before and after caring for your wound. Do not let your family touch your surgery site without cleaning their hands. 6. Narcotic pain medications can cause significant constipation. You may want to add a stool softener to your postoperative medication schedule or speak to your surgeon on how best to manage this SIDE EFFECT. SPECIAL INSTRUCTIONS     Thank you for allowing us to care for you. We strive to exceed your expectations in the delivery of care and service provided to you and your family. If you need to contact us for any reason, please call us at [phone]    Instructions reviewed with patient during preadmission testing phone interview. Annabel Stern 10/9/2020 .9:16 AM      ADDITIONAL EDUCATIONAL INFORMATION REVIEWED PER PHONE WITH YOU AND/OR YOUR FAMILY:    Yes Hibiclens® Bathing Instructions

## 2020-10-09 NOTE — PROGRESS NOTES
Abnormal PTT faxed to Dr. Ana Martinez. Pt not on anti coags. PCP office called for EKG, spoke with [place], to be faxed. EKG received but has not been reviewed by MD. Cristal Hope PCP back and spoke with Sridhar Raymond, she will have MD review and re-fax EKG.   EKG faxed, approved and signed by MD.

## 2020-10-09 NOTE — PROGRESS NOTES
Hibiclens instructions reviewed, pt verbalized understanding. Total joint class video sent to email provided.

## 2020-10-09 NOTE — PROGRESS NOTES
Snoring? Do you snore loudly (loud enough to be heard through closed doors, or your bed partner elbows you for snoring at night)? Yes    Tired? Do you often feel tired, fatigued, or sleepy during the daytime (such as falling asleep during driving)? No    Observed? Has anyone observed you stop breathing or choking/gasping during your sleep? No    Pressure? Do you have or are being treated for high blood pressure? Yes    Neck Size? (measured around Flavios apple)  For male, is your shirt collar 17 inches or larger? For female, is your shirt collar 16 inches or larger? No    Age older than 48years old? Yes    Gender = Male  Yes    Body Mass Index more than 35 kg/m2? No    Risk of ROSE Scoring criteria:    [] Low risk:  Yes to 0 - 2 questions    [x] Intermediate risk:  Yes to 3 - 4 questions    [] High risk:  Yes to 5 - 8 questions       For ALL PATIENTS THAT SCORE  5 or >:    Results called to OR Scheduling? No    Patient given Sleep Apnea Referral Pamphlet?   No

## 2020-10-10 LAB — ZINC: 62.6 UG/DL (ref 60–120)

## 2020-10-12 ENCOUNTER — OFFICE VISIT (OUTPATIENT)
Dept: PRIMARY CARE CLINIC | Age: 80
End: 2020-10-12
Payer: MEDICARE

## 2020-10-12 PROCEDURE — 99211 OFF/OP EST MAY X REQ PHY/QHP: CPT | Performed by: NURSE PRACTITIONER

## 2020-10-12 NOTE — PATIENT INSTRUCTIONS

## 2020-10-13 ENCOUNTER — HOSPITAL ENCOUNTER (OUTPATIENT)
Dept: PHYSICAL THERAPY | Age: 80
Setting detail: THERAPIES SERIES
Discharge: HOME | End: 2020-10-13
Payer: MEDICARE

## 2020-10-13 ENCOUNTER — OFFICE VISIT (OUTPATIENT)
Dept: ORTHOPEDIC SURGERY | Age: 80
End: 2020-10-13
Payer: MEDICARE

## 2020-10-13 ENCOUNTER — ANESTHESIA EVENT (OUTPATIENT)
Dept: OPERATING ROOM | Age: 80
End: 2020-10-13
Payer: MEDICARE

## 2020-10-13 VITALS — WEIGHT: 194 LBS | BODY MASS INDEX: 26.28 KG/M2 | HEIGHT: 72 IN | TEMPERATURE: 97.2 F

## 2020-10-13 LAB — SARS-COV-2, PCR: NOT DETECTED

## 2020-10-13 PROCEDURE — 97161 PT EVAL LOW COMPLEX 20 MIN: CPT

## 2020-10-13 PROCEDURE — 97530 THERAPEUTIC ACTIVITIES: CPT

## 2020-10-13 PROCEDURE — 97110 THERAPEUTIC EXERCISES: CPT

## 2020-10-13 PROCEDURE — MISC250 COMPRESSION STOCKING: Performed by: ORTHOPAEDIC SURGERY

## 2020-10-13 PROCEDURE — 99213 OFFICE O/P EST LOW 20 MIN: CPT | Performed by: ORTHOPAEDIC SURGERY

## 2020-10-13 NOTE — RESULT ENCOUNTER NOTE

## 2020-10-13 NOTE — PROGRESS NOTES
[facility]  History and Physical      Date:  10/13/2020    Name:  Dewey Grider  Address:  [id number]  [place] [address]    :  1940      Age:   68 y.o.    SSN:  [id number]      Medical Record Number:  <[id number]>      Chief Complaint    Pre-op Exam (left knee TKR on 10/14/20)      History of Present Illness:  Dewey Grider is a 68 y.o. male who presents for their pre-op examination. The patient is in good health. The patient has no history of blood clots, no organ dysfunction, not diabetes, no known allergies to medications, tolerates pain medications and is not on any hormone replacement products. Patient does not smoke. The patient recently had a physical from her PCP and was cleared for surgery and stated being in good health. Patient's PCP note for preoperative clearance was reviewed. All the patient's labs were reviewed and were unremarkable. Pain Assessment  Location of Pain: Knee  Location Modifiers: Left  Severity of Pain: 2  Quality of Pain: Sharp  Duration of Pain: Persistent  Frequency of Pain: Constant  Aggravating Factors: Standing, Walking  Relieving Factors: Rest  Result of Injury: No  Work-Related Injury: No  Are there other pain locations you wish to document?: No    Past Medical History:   Diagnosis Date    Hypertension         Past Surgical History:   Procedure Laterality Date    COLONOSCOPY      COLONOSCOPY  10/24/2013       No family history on file.     Social History     Socioeconomic History    Marital status:      Spouse name: None    Number of children: None    Years of education: None    Highest education level: None   Occupational History    None   Social Needs    Financial resource strain: None    Food insecurity     Worry: None     Inability: None    Transportation needs     Medical: None     Non-medical: None   Tobacco Use    Smoking status: Never Smoker    Smokeless tobacco: Never Used   Substance and Sexual Activity    Alcohol use: Yes     Alcohol/week: 3.0 standard drinks     Types: 3 Glasses of wine per week    Drug use: Never    Sexual activity: None   Lifestyle    Physical activity     Days per week: None     Minutes per session: None    Stress: None   Relationships    Social connections     Talks on phone: None     Gets together: None     Attends Sabianist service: None     Active member of club or organization: None     Attends meetings of clubs or organizations: None     Relationship status: None    Intimate partner violence     Fear of current or ex partner: None     Emotionally abused: None     Physically abused: None     Forced sexual activity: None   Other Topics Concern    None   Social History Narrative    None       Current Outpatient Medications   Medication Sig Dispense Refill    benazepril (LOTENSIN) 10 MG tablet Take 10 mg by mouth daily.  amLODIPine (NORVASC) 10 MG tablet Take 10 mg by mouth daily. No current facility-administered medications for this visit. No Known Allergies    Review of Systems:  A 14 point review of systems was completed by the patient and is available in the media section of the scanned medical record and was reviewed on 10/13/2020. The review is negative with the exception of those things mentioned in the HPI and Past Medical History   Review of Systems   Musculoskeletal: Positive for arthralgias and myalgias. Neurological: Negative for weakness and numbness. Vital Signs:   Temp 97.2 °F (36.2 °C) (Infrared)   Ht 6' 0.01\" (1.829 m)   Wt 194 lb 0.1 oz (88 kg)   BMI 26.31 kg/m²     General Exam:    Neuro: Alert & Oriented x 3,  normal,  no focal deficits noted. Normal mood & affect.   Eyes: Sclera clear  Ears: Normal external ear  Mouth:  No perioral lesions  Pulm: Respirations unlabored and regular   Skin: Warm, well perfused    Knee Examination: Left    Skin:  There are no skin lesions, cellulitis, or extreme edema in the lower extremities. Sensation is grossly intact to light touch bilaterally lower extremity. The patient has warm and well-perfused Bilateral     Inspection:  No effusion, ecchymosis, discoloration, erythema, excessive warmth. no gross deformities, no signs of infection.      Palpation: There is moderate patellofemoral crepitus, there is no joint line tenderness. No other osseous or soft tissue tenderness around the knee. Negative calf tenderness     Range of Motion: Grossly intact     Strength: Grossly intact     Special Tests: No ligament instability,  Negative Homans sign     Gait: Varus thrust, Non antalgic, without the use of assistive devices     Alignment: Varus deformity     Neuro: Sensation equal bilateral lower extremities     Vascular: 2+ posterior tibialis pulse        Office Procedures:  Orders Placed This Encounter   Procedures    Compression Stocking $30     Patient was supplied a Compression Sleeve. This retail item was supplied to provide functional support and assist in controlling swelling in the lower extremities. Verbal and written instructions for the use of and application of this item were provided. The patient was educated and fit by a healthcare professional with expert knowledge and specialization in brace application. They were instructed to contact the office immediately should the equipment result in increased pain, decreased sensation, increased swelling or worsening of the condition. Assessment: Patient is a 68 y.o. male presenting to the office for his preop visit. Patient is scheduled to undergo a left total knee replacement tomorrow (10/14/2020).     Visit Diagnoses       Codes    Pre-operative clearance    -  Primary Z01.818    Primary osteoarthritis of left knee     M17.12    Left knee pain, unspecified chronicity     M25.562    Congenital varus deformity of left knee     Q74.1            Medical decision making/treatment plan:  Patient's workup and evaluation were reviewed with the patient in the office today. The perioperative and postoperative protocol/course was reviewed with the patient in the office today. He was given literature on the postoperative protocol. The patient is scheduled for TKR and a discussion and comprehensive presentation of TKR RBA including providing extensive information and written material on the surgery, PO course and rehab and patient expectations and compliance. The Web site patient instructional series was reviewed for further info for the patient and family. The PO instruction sheet is provided and details on the DVT program and skin preparation pre op explained. The risks explained included but not limited to infection, pain, swelling, woumd healing, blood clots, bleeding, fibrosis, anesthesia, allergies meds, atrophy, and limitation in knee motion, implant loosening, need for additional surgery, alignment problems. Success rates reviewed including a normal knee cannot be established and there are limitations on activity that a TKR requires including 10% of patients in general having knee pain limits, weakness on chair on stair activity, and in particular that recreational activity may require major limits. The final result cannot be predicted and each patient responds to surgery differently. The patient verbally expressed an understanding and all questions answered. The patient has major arthritic damage to the knee joint with pain limiting daily activities and significant restrictions and limitations effecting the quality of life. All conservative measures have been completed and the patient wishes to undergo TKR. All the patient's questions were answered while in the clinic. The patient is understanding of all instructions and agrees with the plan.           10/13/20  1:39 PM                2022 Conrad Mcgraw PA-C    Physician Assistant - Certified  [facility] and [facility] [address]    10/13/20 1:39 PM    During this examination, I, [employer], [facility], functioned as a scribe for Dr. Fanny Barlow. This dictation was performed with a verbal recognition program (DRAGON) and it was checked for errors. It is possible that there are still dictated errors within this office note. If so, please bring any errors to my attention for an addendum. All efforts were made to ensure that this office note is accurate. This dictation was performed with a verbal recognition program (DRAGON) and it was checked for errors. It is possible that there are still dictated errors within this office note. If so, please bring any errors to my attention for an addendum. All efforts were made to ensure that this office note is accurate. Supervising Physician Attestation:  I, Dr. Fanny Barlow, personally performed the services described in this documentation as scribed above, and it is both accurate and complete and I agree with all pertinent clinical information. I personally interviewed the patient and performed a physical examination and medical decision making. I discussed the patient's condition and treatment options and have  reviewed and agree with the past medical, family and social history unless otherwise noted. All of the patient's questions were answered.       Board Certified Orthopaedic Surgeon  [facility] and [address]  [facility] and [address] [facility] [employer]  Professor of Madiha Jovel

## 2020-10-13 NOTE — FLOWSHEET NOTE
[address] [facility]    Physical Therapy Daily Treatment Note  Date:  10/13/2020    Patient Name:  Estela Valera    :  1940  MRN: [id number]  Restrictions/Precautions:    Medical/Treatment Diagnosis Information:  · Diagnosis: M17.12 (ICD-10-CM) - Primary osteoarthritis of left knee  · Treatment Diagnosis: M25.562 L Knee Pain; R53.1 Weakness  · Pre-op Left TKR  · DOS: 10/14/20  · Meds: update PO  Insurance/Certification information:  PT Insurance Information: PT BENEFITS / AETNA/ EFFECTIVE 20/ ACTIVE/  / PAYS 96%/ OOP 1500 . 95/ NO VISIT LIMIT MED NEC/ 0 AUTH/ AYLIO M. REF# [id number]/ BENEFIT FAX IN MEDIA/ 10-7-20 PAG  Physician Information:  Referring Practitioner: Mello Jay  Has the plan of care been signed (Y/N):        []  Yes  [x]  No     Date of Patient follow up with Physician: 1, 3, 6, 12 weeks PO  Patient seen in consultation with Dr. Mello Jay who established the initial/subsequent treatment protocol. Is this a Progress Report:     []  Yes  [x]  No        If Yes:  Date Range for reporting period:  Beginning  Ending    Progress report will be due (10 Rx or 30 days whichever is less):        Recertification will be due (POC Duration  / 90 days whichever is less): 21         Visit # Insurance Allowable Auth Required   1 MED NEC []  Yes [x]  No      OUTCOME MEASURE DATE DEFICIT   WOMAC 10/13/20 pre-op 43% Deficit        Patient given satisfaction survey?  [] Yes   [x] No       Latex Allergy:  [x]NO      []YES  Preferred Language for Healthcare:   [x]English       []other:     Pain level:  0-10/10     SUBJECTIVE:  See eval    OBJECTIVE:     10/13/20 deferred  Flexibility L R Comment   Hamstring         Gastroc         ITB         Quad                      10/13/20  ROM PROM AROM Overpressure Comment     L R L R L R     Flexion     140 130         Extension     0 0                                              10/13/20  Strength L R Comment   Quad 5 5     Hamstring 5 5     Gastroc         Hip  flexion 4 4     Hip abd                                10/13/20  Special Test Results/Comment   Homans Negative   Temperature Negative         10/13/20  Girth L R   Mid Patella 40.5 41.0   Suprapatellar 41.0 41.0   5cm above NT - clothing NT - clothing   15cm above NT - clothing NT - clothing      Reflexes/Sensation:               []?Dermatomes/Myotomes intact               []?Reflexes equal and normal bilaterally              []?Other:     Joint mobility:                []?Normal                       [x]? Hypo: decreased patellar mobility              []?Hyper     Palpation: no significant TTP     Functional Mobility/Transfers: ind     Posture: varus     Bandages/Dressings/Incisions: update PO     Gait: (include devices/WB status) WNl     Orthopedic Special Tests: functional testing deferred due to time constraints       RESTRICTIONS/PRECAUTIONS: HTN (medicated);     Exercises/Interventions:  Exercise/Equipment Resistance/Repetitions Other comments   Stretching       Hamstring     Hip Flexor     ITB     Figure 4     Quad     Inclined Calf     Towel Pull             ROM       EOB Self-Assist     Sheet Pull     Wall Slide     Manual     Biodex Passive     Recumbent Bike     ERMI     Hang Weights     Ankle Pumps             Patellar Glides       Medial       Superior       Inferior               Isometrics       Quad sets      Add sets              SLR       Supine     Prone     Abduction     Adducton     SLR+               Glutes       Bridges     Supine Clams     S/L Clams     Side Stepping       Monster walks               CKC       Weight Shift     Calf raises     Wall sits     Step ups       Squatting     CC TKE     SL DL               PRE       Extension  RANGE: 90-30   Flexion  RANGE: Avail   Leg Press   RANGE: 80-10   Cable Column               Balance       Tandem Stance     Tilt board       SLS      Biodex balance Other       Treadmill       Gait Training          Manual Interventions          Patient have access to gym? [] Yes  [] No  Patient have equipment at home? [] Yes  [] No     Patient Education:  10/13/20: Educated patient on all pre-op and post-op instructions including but not limited to R.I.C.E., post-op HEP, DVT prevention, warning signs of infection and DVT, and on activity limitations. HEP:  Patient instructed on HEP on this date with handout provided and all questions answered. Patient was instructed to contact PT with any complications or questions about HEP moving forward. Patient verbally stated she/he understood. Updated 10/13/20  [employer] CODE: [id number]    Therapeutic Exercise and NMR EXR  [x] (66035) Provided verbal/tactile cueing for activities related to strengthening, flexibility, endurance, ROM for improvements in LE, proximal hip, and core control with self care, mobility, lifting, ambulation. [x] (36390) Provided verbal/tactile cueing for activities related to improving balance, coordination, kinesthetic sense, posture, motor skill, proprioception  to assist with LE, proximal hip, and core control in self care, mobility, lifting, ambulation and eccentric single leg control.      NMR and Therapeutic Activities:    [x] (78984 or 52766) Provided verbal/tactile cueing for activities related to improving balance, coordination, kinesthetic sense, posture, motor skill, proprioception and motor activation to allow for proper function of core, proximal hip and LE with self care and ADLs  [x] (81761) Gait Re-education- Provided training and instruction to the patient for proper LE, core and proximal hip recruitment and positioning and eccentric body weight control with ambulation re-education including up and down stairs     Home Exercise Program:    [x] (90382) Reviewed/Progressed HEP activities related to strengthening, flexibility, endurance, ROM of core, proximal hip and LE for functional self-care, mobility, lifting and ambulation/stair navigation   [] (71293)Reviewed/Progressed HEP activities related to improving balance, coordination, kinesthetic sense, posture, motor skill, proprioception of core, proximal hip and LE for self care, mobility, lifting, and ambulation/stair navigation      Manual Treatments:  PROM / STM / Oscillations-Mobs:  G-I, II, III, IV (PA's, Inf., Post.)  [x] (70136) Provided manual therapy to mobilize LE, proximal hip and/or LS spine soft tissue/joints for the purpose of modulating pain, promoting relaxation,  increasing ROM, reducing/eliminating soft tissue swelling/inflammation/restriction, improving soft tissue extensibility and allowing for proper ROM for normal function with self care, mobility, lifting and ambulation. 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 Independent in HEP and progression per patient tolerance, in order to prevent re-injury. [] Progressing: [] Met: [] Not Met: [] Adjusted   2. Patient will have a decrease in pain to facilitate improvement in movement, function, and ADLs as indicated by Functional Deficits. [] Progressing: [] Met: [] Not Met: [] Adjusted    Long Term Goals: To be achieved in: 12 weeks  1.  Disability index score of 21% or less for the LEFS to assist with reaching prior level of function. [] Progressing: [] Met: [] Not Met: [] Adjusted  2. Patient will demonstrate increased AROM to 0-125 or greater to allow for proper joint functioning as indicated by patients Functional Deficits. [] Progressing: [] Met: [] Not Met: [] Adjusted  3. Patient will demonstrate an increase in Strength to 5/5 in BLE to allow for proper functional mobility as indicated by patients Functional Deficits. [] Progressing: [] Met: [] Not Met: [] Adjusted  4. Patient will return to ADL and other functional activities without increased symptoms or restriction. [] Progressing: [] Met: [] Not Met: [] Adjusted  5. Patient will be able to ascend/descend 10 stairs without pain in left knee. (patient specific functional goal)    [] Progressing: [] Met: [] Not Met: [] Adjusted    Overall Progression Towards Functional goals/ Treatment Progress Update:  [] Patient is progressing as expected towards functional goals listed. [] Progression is slowed due to complexities/Impairments listed. [] Progression has been slowed due to co-morbidities.   [x] Plan just implemented, too soon to assess goals progression <30days   [] Goals require adjustment due to lack of progress  [] Patient is not progressing as expected and requires additional follow up with physician  [] Other    Prognosis for POC: [x] Good [] Fair  [] Poor    Patient requires continued skilled intervention: [x] Yes  [] No    Treatment/Activity Tolerance:  [x] Patient able to complete treatment  [] Patient limited by fatigue  [] Patient limited by pain     [] Patient limited by other medical complications  [] Other:       PLAN: See eval  [] Continue per plan of care [] Alter current plan (see comments above)  [x] Plan of care initiated [] Hold pending MD visit [] Discharge      Electronically signed by:  Hernandez Crawford, PT, DPT, OCS    Note: If patient does not return for scheduled/ recommended follow up visits, this note will serve as a discharge from care along with most recent update on progress.

## 2020-10-13 NOTE — PLAN OF CARE
The [address] [place] [address] [place] [address] [place] [address]  [phone]                                                       Physical Therapy Certification    Dear Referring Practitioner: Sahara Palumbo,    We had the pleasure of evaluating the following patient for physical therapy services at [address] [place] [address]. A summary of our findings can be found in the initial assessment below. This includes our plan of care. If you have any questions or concerns regarding these findings, please do not hesitate to contact me at the office phone number checked above. Thank you for the referral.       Physician Signature:_______________________________Date:__________________  By signing above (or electronic signature), therapists plan is approved by physician      Patient: Jarred Tanner   : 1940   MRN: [id number]  Referring Physician: Referring Practitioner: Sahara Palumbo      Evaluation Date: 10/13/2020      Medical Diagnosis Information:  Diagnosis: M17.12 (ICD-10-CM) - Primary osteoarthritis of left knee   Treatment Diagnosis: M25.562 L Knee Pain; R53.1 Weakness                                         Insurance information: PT Insurance Information: PT BENEFITS / [employer]/ EFFECTIVE 20/ ACTIVE/  / PAYS 96%/ OOP 1500 . 95/ NO VISIT LIMIT MED NEC/ 0 AUTH/ THOMPSONA M. REF# [id number]/ BENEFIT FAX IN MEDIA/ 10-7-20 PAG     Precautions/ Contra-indications: HTN (medicated);     C-SSRS Triggered by Intake questionnaire (Past 2 wk assessment):   [x] No, Questionnaire did not trigger screening.   [] Yes, Patient intake triggered further evaluation      [] C-SSRS Screening completed  [] PCP notified via Plan of Care  [] Emergency services notified     Latex Allergy:  [x]NO      []YES  Preferred Language for Healthcare:   [x]English       []other:    SUBJECTIVE: Patient stated complaint: 68year old male presents to PT for pre-op appointment for planned Left TKR 10/14/20. Has been having on-going left knee pain for 3 years since he tore his meniscus but this did not require surgery. Symptoms progressively worsened over the past few years with no improvement so scheduled for surgery. Patient is active and is having trouble kneeling. Injections were only providing brief relief but no long term improvement.      Relevant Medical History: No previous knee surgeries  Functional Disability Index:   OUTCOME MEASURE DATE DEFICIT   WOMAC 10/13/20 Pre-op 43% Deficit          Pain Scale: 0/10 at rest, 10/10 at worst  Easing factors: Rest  Provocative factors: prolonged standing or walking (1-1.5 hours); kneeling;  Stair climbing;     Type: []Constant   [x]Intermittent  []Radiating [x]Localized []other:     Numbness/Tingling: Denies    Occupation/School: Retired    Living Status/Prior Level of Function: Independent with ADLs and IADLs; enjoys working on cars as well as taking care of his home / property    OBJECTIVE:      10/13/20 deferred  Flexibility L R Comment   Hamstring      Gastroc      ITB      Quad              10/13/20  ROM PROM AROM Overpressure Comment    L R L R L R    Flexion   140 130      Extension   0 0                            10/13/20  Strength L R Comment   Quad 5 5    Hamstring 5 5    Gastroc      Hip  flexion 4 4    Hip abd                    10/13/20  Special Test Results/Comment   Homans Negative   Temperature Negative       10/13/20  Girth L R   Mid Patella 40.5 41.0   Suprapatellar 41.0 41.0   5cm above NT - clothing NT - clothing   15cm above NT - clothing NT - clothing     Reflexes/Sensation:    []Dermatomes/Myotomes intact    []Reflexes equal and normal bilaterally   []Other:    Joint mobility:     []Normal    [x]Hypo: decreased patellar mobility   []Hyper    Palpation: no significant TTP    Functional Mobility/Transfers: ind    Posture: varus    Bandages/Dressings/Incisions: update PO    Gait: (include devices/WB status) WNl    Orthopedic Special Tests: functional testing deferred due to time constraints                       [x] Patient history, allergies, meds reviewed. Medical chart reviewed. See intake form. Review Of Systems (ROS):  [x]Performed Review of systems (Integumentary, CardioPulmonary, Neurological) by intake and observation. Intake form has been scanned into medical record. Patient has been instructed to contact their primary care physician regarding ROS issues if not already being addressed at this time. Co-morbidities/Complexities (which will affect course of rehabilitation):   []None           Arthritic conditions   []Rheumatoid arthritis (M05.9)  [x]Osteoarthritis (M19.91)   Cardiovascular conditions   [x]Hypertension (I10)  []Hyperlipidemia (E78.5)  []Angina pectoris (I20)  []Atherosclerosis (I70)   Musculoskeletal conditions   []Disc pathology   []Congenital spine pathologies   []Prior surgical intervention  []Osteoporosis (M81.8)  []Osteopenia (M85.8)   Endocrine conditions   []Hypothyroid (E03.9)  []Hyperthyroid Gastrointestinal conditions   []Constipation (M91.28)   Metabolic conditions   []Morbid obesity (E66.01)  []Diabetes type 1(E10.65) or 2 (E11.65)   []Neuropathy (G60.9)     Pulmonary conditions   []Asthma (J45)  []Coughing   []COPD (J44.9)   Psychological Disorders  []Anxiety (F41.9)  []Depression (F32.9)   []Other:   []Other:          Barriers to/and or personal factors that will affect rehab potential:              [x]Age  []Sex              [x]Motivation/Lack of Motivation                        [x]Co-Morbidities              []Cognitive Function, education/learning barriers              []Environmental, home barriers              []profession/work barriers  []past PT/medical experience  []other:  Justification: motivated to return to active lifestyle    Falls Risk Assessment (30 days):   [x] Falls Risk assessed and no intervention required.   [] Falls Risk assessed and Patient requires intervention due to being higher risk   TUG score (>12s at risk):     [] Falls education provided, including       G-Codes:     OUTCOME MEASURE DATE DEFICIT   WOMAC 10/13/20 Pre-op 43% Deficit          ASSESSMENT:   Functional Impairments:     []Noted lumbar/proximal hip/LE hypomobility   [x]Decreased LE functional ROM   [x]Decreased core/proximal hip strength and neuromuscular control   [x]Decreased LE functional strength   [x]Reduced balance/proprioceptive control   []other:      Functional Activity Limitations (from functional questionnaire and intake)   [x]Reduced ability to tolerate prolonged functional positions   [x]Reduced ability or difficulty with changes of positions or transfers between positions   [x]Reduced ability to maintain good posture and demonstrate good body mechanics with sitting, bending, and lifting   [x]Reduced ability to sleep   [x] Reduced ability or tolerance with driving and/or computer work   [x]Reduced ability to perform lifting, carrying tasks   [x]Reduced ability to squat   []Reduced ability to forward bend   [x]Reduced ability to ambulate prolonged functional periods/distances/surfaces   [x]Reduced ability to ascend/descend stairs   [x]Reduced ability to run, hop or jump   []other:     Participation Restrictions   [x]Reduced participation in self care activities   [x]Reduced participation in home management activities   [x]Reduced participation in work activities   [x]Reduced participation in social activities. []Reduced participation in sport activities. Classification :    [x]Signs/symptoms consistent with post-surgical status including decreased ROM, strength and function.    []Signs/symptoms consistent with joint sprain/strain   []Signs/symptoms consistent with patella-femoral syndrome   [x]Signs/symptoms consistent with knee OA/hip OA   []Signs/symptoms consistent with internal derangement of knee/Hip   []Signs/symptoms consistent with functional hip weakness/NMR control      []Signs/symptoms consistent with tendinitis/tendinosis    []signs/symptoms consistent with pathology which may benefit from Dry needling      []other:      Prognosis/Rehab Potential:      []Excellent   [x]Good    []Fair   []Poor    Tolerance of evaluation/treatment:    []Excellent   [x]Good    []Fair   []Poor    Physical Therapy Evaluation Complexity Justification  [x] A history of present problem with:  [] no personal factors and/or comorbidities that impact the plan of care;  [x]1-2 personal factors and/or comorbidities that impact the plan of care  []3 personal factors and/or comorbidities that impact the plan of care  [x] An examination of body systems using standardized tests and measures addressing any of the following: body structures and functions (impairments), activity limitations, and/or participation restrictions;:  [] a total of 1-2 or more elements   [] a total of 3 or more elements   [x] a total of 4 or more elements   [x] A clinical presentation with:  [] stable and/or uncomplicated characteristics   [x] evolving clinical presentation with changing characteristics  [] unstable and unpredictable characteristics;   [x] Clinical decision making of [] low, [x] moderate, [] high complexity using standardized patient assessment instrument and/or measurable assessment of functional outcome. [] EVAL (LOW) 70333 (typically 20 minutes face-to-face)  [x] EVAL (MOD) 10744 (typically 30 minutes face-to-face)  [] EVAL (HIGH) 45582 (typically 45 minutes face-to-face)  [] RE-EVAL       PLAN  Frequency/Duration:  2 days per week for 12 Weeks:  Interventions:  [x]  Therapeutic exercise including: strength training, ROM, for Lower extremity and core   [x]  NMR activation and proprioception for LE, Glutes and Core   [x]  Manual therapy as indicated for LE, Hip and spine to include: Dry Needling/IASTM, STM, PROM, Gr I-IV mobilizations, manipulation.    [x] Modalities as needed that may include: thermal agents, E-stim, Biofeedback, US, iontophoresis as indicated  [x] Patient education on joint protection, postural re-education, activity modification, progression of HEP. HEP instruction:  Patient instructed on HEP on this date with handout provided and all questions answered. Patient was instructed to contact PT with any complications or questions about HEP moving forward. Patient verbally stated he understood. [facility] [id number]    GOALS:   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 Independent in HEP and progression per patient tolerance, in order to prevent re-injury. [] Progressing: [] Met: [] Not Met: [] Adjusted   2. Patient will have a decrease in pain to facilitate improvement in movement, function, and ADLs as indicated by Functional Deficits. [] Progressing: [] Met: [] Not Met: [] Adjusted    Long Term Goals: To be achieved in: 12 weeks  1. Disability index score of 21% or less for the LEFS to assist with reaching prior level of function. [] Progressing: [] Met: [] Not Met: [] Adjusted  2. Patient will demonstrate increased AROM to 0-125 or greater to allow for proper joint functioning as indicated by patients Functional Deficits. [] Progressing: [] Met: [] Not Met: [] Adjusted  3. Patient will demonstrate an increase in Strength to 5/5 in BLE to allow for proper functional mobility as indicated by patients Functional Deficits. [] Progressing: [] Met: [] Not Met: [] Adjusted  4. Patient will return to ADL and other functional activities without increased symptoms or restriction. [] Progressing: [] Met: [] Not Met: [] Adjusted  5.  Patient will be able to ascend/descend 10 stairs without pain in left knee. (patient specific functional goal)    [] Progressing: [] Met: [] Not Met: [] Adjusted       Electronically signed by: Ana Vergara, PT, DPT, OCS    Note: If patient does not return for scheduled/ recommended follow up visits, this note will serve as a discharge from care along with most recent update on progress.

## 2020-10-14 ENCOUNTER — HOSPITAL ENCOUNTER (OUTPATIENT)
Age: 80
Discharge: HOME | End: 2020-10-15
Attending: ORTHOPAEDIC SURGERY | Admitting: ORTHOPAEDIC SURGERY
Payer: MEDICARE

## 2020-10-14 ENCOUNTER — ANESTHESIA (OUTPATIENT)
Dept: OPERATING ROOM | Age: 80
End: 2020-10-14
Payer: MEDICARE

## 2020-10-14 ENCOUNTER — APPOINTMENT (OUTPATIENT)
Dept: GENERAL RADIOLOGY | Age: 80
End: 2020-10-14
Attending: ORTHOPAEDIC SURGERY
Payer: MEDICARE

## 2020-10-14 VITALS — DIASTOLIC BLOOD PRESSURE: 65 MMHG | OXYGEN SATURATION: 99 % | SYSTOLIC BLOOD PRESSURE: 116 MMHG | TEMPERATURE: 97.9 F

## 2020-10-14 DIAGNOSIS — M17.12 PRIMARY OSTEOARTHRITIS OF LEFT KNEE: ICD-10-CM

## 2020-10-14 DIAGNOSIS — Z96.652 STATUS POST TOTAL LEFT KNEE REPLACEMENT: Primary | ICD-10-CM

## 2020-10-14 PROCEDURE — 3700000000 HC ANESTHESIA ATTENDED CARE: Performed by: ORTHOPAEDIC SURGERY

## 2020-10-14 PROCEDURE — 2720000010 HC SURG SUPPLY STERILE: Performed by: ORTHOPAEDIC SURGERY

## 2020-10-14 PROCEDURE — 6360000002 HC RX W HCPCS: Performed by: NURSE ANESTHETIST, CERTIFIED REGISTERED

## 2020-10-14 PROCEDURE — 2500000003 HC RX 250 WO HCPCS: Performed by: NURSE ANESTHETIST, CERTIFIED REGISTERED

## 2020-10-14 PROCEDURE — 1200000000 HC SEMI PRIVATE

## 2020-10-14 PROCEDURE — 2709999900 HC NON-CHARGEABLE SUPPLY: Performed by: ORTHOPAEDIC SURGERY

## 2020-10-14 PROCEDURE — 94150 VITAL CAPACITY TEST: CPT

## 2020-10-14 PROCEDURE — 6360000002 HC RX W HCPCS: Performed by: STUDENT IN AN ORGANIZED HEALTH CARE EDUCATION/TRAINING PROGRAM

## 2020-10-14 PROCEDURE — 3700000001 HC ADD 15 MINUTES (ANESTHESIA): Performed by: ORTHOPAEDIC SURGERY

## 2020-10-14 PROCEDURE — 3600000005 HC SURGERY LEVEL 5 BASE: Performed by: ORTHOPAEDIC SURGERY

## 2020-10-14 PROCEDURE — 2580000003 HC RX 258: Performed by: ORTHOPAEDIC SURGERY

## 2020-10-14 PROCEDURE — 6360000002 HC RX W HCPCS: Performed by: ORTHOPAEDIC SURGERY

## 2020-10-14 PROCEDURE — 7100000001 HC PACU RECOVERY - ADDTL 15 MIN: Performed by: ORTHOPAEDIC SURGERY

## 2020-10-14 PROCEDURE — 6360000002 HC RX W HCPCS: Performed by: ANESTHESIOLOGY

## 2020-10-14 PROCEDURE — 6370000000 HC RX 637 (ALT 250 FOR IP): Performed by: STUDENT IN AN ORGANIZED HEALTH CARE EDUCATION/TRAINING PROGRAM

## 2020-10-14 PROCEDURE — 88305 TISSUE EXAM BY PATHOLOGIST: CPT

## 2020-10-14 PROCEDURE — 2500000003 HC RX 250 WO HCPCS: Performed by: ORTHOPAEDIC SURGERY

## 2020-10-14 PROCEDURE — 73560 X-RAY EXAM OF KNEE 1 OR 2: CPT

## 2020-10-14 PROCEDURE — C1776 JOINT DEVICE (IMPLANTABLE): HCPCS | Performed by: ORTHOPAEDIC SURGERY

## 2020-10-14 PROCEDURE — 7100000000 HC PACU RECOVERY - FIRST 15 MIN: Performed by: ORTHOPAEDIC SURGERY

## 2020-10-14 PROCEDURE — 2580000003 HC RX 258: Performed by: ANESTHESIOLOGY

## 2020-10-14 PROCEDURE — C1713 ANCHOR/SCREW BN/BN,TIS/BN: HCPCS | Performed by: ORTHOPAEDIC SURGERY

## 2020-10-14 PROCEDURE — 2580000003 HC RX 258: Performed by: STUDENT IN AN ORGANIZED HEALTH CARE EDUCATION/TRAINING PROGRAM

## 2020-10-14 PROCEDURE — 3600000015 HC SURGERY LEVEL 5 ADDTL 15MIN: Performed by: ORTHOPAEDIC SURGERY

## 2020-10-14 DEVICE — NON RIMMED SPEED PIN 65MM STERILE: Type: IMPLANTABLE DEVICE | Site: KNEE | Status: FUNCTIONAL

## 2020-10-14 DEVICE — CEMENT BNE 40 GM RADIOPAQUE BA SIMPLEX P: Type: IMPLANTABLE DEVICE | Site: KNEE | Status: FUNCTIONAL

## 2020-10-14 DEVICE — JOURNEY 7.5 ROUND RESURF PAT 35MM STANDARD
Type: IMPLANTABLE DEVICE | Site: KNEE | Status: FUNCTIONAL
Brand: JOURNEY

## 2020-10-14 DEVICE — KNEE K1 TOT HEMI STD CEM IMPL CAPPED K1 SN: Type: IMPLANTABLE DEVICE | Site: KNEE | Status: FUNCTIONAL

## 2020-10-14 DEVICE — JOURNEY TIBIAL BASEPLATE NONPOROUS                                    LEFT SIZE 6
Type: IMPLANTABLE DEVICE | Site: KNEE | Status: FUNCTIONAL
Brand: JOURNEY

## 2020-10-14 DEVICE — JOURNEY II BCS FEMORAL OXINIUM                                    LEFT SIZE 7
Type: IMPLANTABLE DEVICE | Site: KNEE | Status: FUNCTIONAL
Brand: JOURNEY

## 2020-10-14 DEVICE — JOURNEY II BCS XLPE ARTICULAR                                    INSERT SZ 5-6 LEFT 13MM
Type: IMPLANTABLE DEVICE | Site: KNEE | Status: FUNCTIONAL
Brand: JOURNEY

## 2020-10-14 RX ORDER — ROCURONIUM BROMIDE 10 MG/ML
INJECTION, SOLUTION INTRAVENOUS PRN
Status: DISCONTINUED | OUTPATIENT
Start: 2020-10-14 | End: 2020-10-14 | Stop reason: SDUPTHER

## 2020-10-14 RX ORDER — SODIUM CHLORIDE 0.9 % (FLUSH) 0.9 %
10 SYRINGE (ML) INJECTION EVERY 12 HOURS SCHEDULED
Status: DISCONTINUED | OUTPATIENT
Start: 2020-10-14 | End: 2020-10-15 | Stop reason: HOSPADM

## 2020-10-14 RX ORDER — DEXAMETHASONE SODIUM PHOSPHATE 4 MG/ML
INJECTION, SOLUTION INTRA-ARTICULAR; INTRALESIONAL; INTRAMUSCULAR; INTRAVENOUS; SOFT TISSUE PRN
Status: DISCONTINUED | OUTPATIENT
Start: 2020-10-14 | End: 2020-10-14 | Stop reason: SDUPTHER

## 2020-10-14 RX ORDER — LIDOCAINE HYDROCHLORIDE 20 MG/ML
INJECTION, SOLUTION INTRAVENOUS PRN
Status: DISCONTINUED | OUTPATIENT
Start: 2020-10-14 | End: 2020-10-14 | Stop reason: SDUPTHER

## 2020-10-14 RX ORDER — MIDAZOLAM HYDROCHLORIDE 1 MG/ML
INJECTION INTRAMUSCULAR; INTRAVENOUS PRN
Status: DISCONTINUED | OUTPATIENT
Start: 2020-10-14 | End: 2020-10-14 | Stop reason: SDUPTHER

## 2020-10-14 RX ORDER — SODIUM CHLORIDE 0.9 % (FLUSH) 0.9 %
10 SYRINGE (ML) INJECTION EVERY 12 HOURS SCHEDULED
Status: DISCONTINUED | OUTPATIENT
Start: 2020-10-14 | End: 2020-10-14 | Stop reason: HOSPADM

## 2020-10-14 RX ORDER — OXYCODONE HYDROCHLORIDE AND ACETAMINOPHEN 5; 325 MG/1; MG/1
1-2 TABLET ORAL EVERY 4 HOURS PRN
Qty: 42 TABLET | Refills: 0 | Status: SHIPPED | OUTPATIENT
Start: 2020-10-14 | End: 2020-10-21

## 2020-10-14 RX ORDER — SODIUM CHLORIDE 0.9 % (FLUSH) 0.9 %
10 SYRINGE (ML) INJECTION PRN
Status: DISCONTINUED | OUTPATIENT
Start: 2020-10-14 | End: 2020-10-14 | Stop reason: HOSPADM

## 2020-10-14 RX ORDER — SENNA AND DOCUSATE SODIUM 50; 8.6 MG/1; MG/1
1 TABLET, FILM COATED ORAL 2 TIMES DAILY
Status: DISCONTINUED | OUTPATIENT
Start: 2020-10-14 | End: 2020-10-15 | Stop reason: HOSPADM

## 2020-10-14 RX ORDER — FENTANYL CITRATE 50 UG/ML
INJECTION, SOLUTION INTRAMUSCULAR; INTRAVENOUS PRN
Status: DISCONTINUED | OUTPATIENT
Start: 2020-10-14 | End: 2020-10-14 | Stop reason: SDUPTHER

## 2020-10-14 RX ORDER — OXYCODONE HYDROCHLORIDE 5 MG/1
10 TABLET ORAL EVERY 4 HOURS PRN
Status: DISCONTINUED | OUTPATIENT
Start: 2020-10-14 | End: 2020-10-15 | Stop reason: HOSPADM

## 2020-10-14 RX ORDER — HYDRALAZINE HYDROCHLORIDE 20 MG/ML
5 INJECTION INTRAMUSCULAR; INTRAVENOUS EVERY 10 MIN PRN
Status: DISCONTINUED | OUTPATIENT
Start: 2020-10-14 | End: 2020-10-14 | Stop reason: HOSPADM

## 2020-10-14 RX ORDER — FENTANYL CITRATE 50 UG/ML
50 INJECTION, SOLUTION INTRAMUSCULAR; INTRAVENOUS EVERY 5 MIN PRN
Status: DISCONTINUED | OUTPATIENT
Start: 2020-10-14 | End: 2020-10-14 | Stop reason: HOSPADM

## 2020-10-14 RX ORDER — GLYCOPYRROLATE 1 MG/5 ML
SYRINGE (ML) INTRAVENOUS PRN
Status: DISCONTINUED | OUTPATIENT
Start: 2020-10-14 | End: 2020-10-14 | Stop reason: SDUPTHER

## 2020-10-14 RX ORDER — SODIUM CHLORIDE, SODIUM LACTATE, POTASSIUM CHLORIDE, CALCIUM CHLORIDE 600; 310; 30; 20 MG/100ML; MG/100ML; MG/100ML; MG/100ML
INJECTION, SOLUTION INTRAVENOUS CONTINUOUS
Status: DISCONTINUED | OUTPATIENT
Start: 2020-10-14 | End: 2020-10-14

## 2020-10-14 RX ORDER — ACETAMINOPHEN 325 MG/1
650 TABLET ORAL EVERY 6 HOURS
Status: DISCONTINUED | OUTPATIENT
Start: 2020-10-14 | End: 2020-10-15 | Stop reason: HOSPADM

## 2020-10-14 RX ORDER — SODIUM CHLORIDE 9 MG/ML
INJECTION, SOLUTION INTRAVENOUS CONTINUOUS
Status: DISCONTINUED | OUTPATIENT
Start: 2020-10-14 | End: 2020-10-15 | Stop reason: HOSPADM

## 2020-10-14 RX ORDER — ONDANSETRON 2 MG/ML
4 INJECTION INTRAMUSCULAR; INTRAVENOUS
Status: DISCONTINUED | OUTPATIENT
Start: 2020-10-14 | End: 2020-10-14 | Stop reason: HOSPADM

## 2020-10-14 RX ORDER — OXYCODONE HYDROCHLORIDE AND ACETAMINOPHEN 5; 325 MG/1; MG/1
1 TABLET ORAL
Status: DISCONTINUED | OUTPATIENT
Start: 2020-10-14 | End: 2020-10-14 | Stop reason: HOSPADM

## 2020-10-14 RX ORDER — OXYCODONE HYDROCHLORIDE 5 MG/1
5 TABLET ORAL EVERY 4 HOURS PRN
Status: DISCONTINUED | OUTPATIENT
Start: 2020-10-14 | End: 2020-10-15 | Stop reason: HOSPADM

## 2020-10-14 RX ORDER — ONDANSETRON 4 MG/1
4 TABLET, FILM COATED ORAL EVERY 8 HOURS PRN
Qty: 30 TABLET | Refills: 0 | Status: SHIPPED | OUTPATIENT
Start: 2020-10-14

## 2020-10-14 RX ORDER — MORPHINE SULFATE 2 MG/ML
2 INJECTION, SOLUTION INTRAMUSCULAR; INTRAVENOUS
Status: DISCONTINUED | OUTPATIENT
Start: 2020-10-14 | End: 2020-10-15 | Stop reason: HOSPADM

## 2020-10-14 RX ORDER — AMLODIPINE BESYLATE 10 MG/1
10 TABLET ORAL DAILY
Status: DISCONTINUED | OUTPATIENT
Start: 2020-10-14 | End: 2020-10-15 | Stop reason: HOSPADM

## 2020-10-14 RX ORDER — LABETALOL 20 MG/4 ML (5 MG/ML) INTRAVENOUS SYRINGE
5 EVERY 10 MIN PRN
Status: DISCONTINUED | OUTPATIENT
Start: 2020-10-14 | End: 2020-10-14 | Stop reason: HOSPADM

## 2020-10-14 RX ORDER — SODIUM CHLORIDE 9 MG/ML
INJECTION, SOLUTION INTRAVENOUS CONTINUOUS
Status: DISCONTINUED | OUTPATIENT
Start: 2020-10-14 | End: 2020-10-14

## 2020-10-14 RX ORDER — ONDANSETRON 2 MG/ML
INJECTION INTRAMUSCULAR; INTRAVENOUS PRN
Status: DISCONTINUED | OUTPATIENT
Start: 2020-10-14 | End: 2020-10-14 | Stop reason: SDUPTHER

## 2020-10-14 RX ORDER — CEFAZOLIN SODIUM 2 G/50ML
2 SOLUTION INTRAVENOUS ONCE
Status: COMPLETED | OUTPATIENT
Start: 2020-10-14 | End: 2020-10-14

## 2020-10-14 RX ORDER — SODIUM CHLORIDE 0.9 % (FLUSH) 0.9 %
10 SYRINGE (ML) INJECTION PRN
Status: DISCONTINUED | OUTPATIENT
Start: 2020-10-14 | End: 2020-10-15 | Stop reason: HOSPADM

## 2020-10-14 RX ORDER — FENTANYL CITRATE 50 UG/ML
25 INJECTION, SOLUTION INTRAMUSCULAR; INTRAVENOUS EVERY 5 MIN PRN
Status: DISCONTINUED | OUTPATIENT
Start: 2020-10-14 | End: 2020-10-14 | Stop reason: HOSPADM

## 2020-10-14 RX ORDER — AMOXICILLIN 250 MG
2 CAPSULE ORAL DAILY PRN
Qty: 30 TABLET | Refills: 0 | Status: SHIPPED | OUTPATIENT
Start: 2020-10-14

## 2020-10-14 RX ORDER — PROMETHAZINE HYDROCHLORIDE 25 MG/ML
6.25 INJECTION, SOLUTION INTRAMUSCULAR; INTRAVENOUS
Status: DISCONTINUED | OUTPATIENT
Start: 2020-10-14 | End: 2020-10-14 | Stop reason: HOSPADM

## 2020-10-14 RX ORDER — ASPIRIN 81 MG/1
81 TABLET ORAL 2 TIMES DAILY
Status: DISCONTINUED | OUTPATIENT
Start: 2020-10-15 | End: 2020-10-15 | Stop reason: HOSPADM

## 2020-10-14 RX ORDER — CEFAZOLIN SODIUM 2 G/50ML
2 SOLUTION INTRAVENOUS EVERY 8 HOURS
Status: DISCONTINUED | OUTPATIENT
Start: 2020-10-14 | End: 2020-10-15 | Stop reason: HOSPADM

## 2020-10-14 RX ORDER — EPHEDRINE SULFATE 50 MG/ML
INJECTION, SOLUTION INTRAVENOUS PRN
Status: DISCONTINUED | OUTPATIENT
Start: 2020-10-14 | End: 2020-10-14 | Stop reason: SDUPTHER

## 2020-10-14 RX ORDER — PROPOFOL 10 MG/ML
INJECTION, EMULSION INTRAVENOUS PRN
Status: DISCONTINUED | OUTPATIENT
Start: 2020-10-14 | End: 2020-10-14 | Stop reason: SDUPTHER

## 2020-10-14 RX ADMIN — FENTANYL CITRATE 50 MCG: 50 INJECTION, SOLUTION INTRAMUSCULAR; INTRAVENOUS at 17:10

## 2020-10-14 RX ADMIN — LIDOCAINE HYDROCHLORIDE 80 MG: 20 INJECTION, SOLUTION INTRAVENOUS at 12:56

## 2020-10-14 RX ADMIN — FENTANYL CITRATE 50 MCG: 50 INJECTION INTRAMUSCULAR; INTRAVENOUS at 13:17

## 2020-10-14 RX ADMIN — CEFAZOLIN SODIUM 2 G: 2 SOLUTION INTRAVENOUS at 12:45

## 2020-10-14 RX ADMIN — FENTANYL CITRATE 25 MCG: 50 INJECTION, SOLUTION INTRAMUSCULAR; INTRAVENOUS at 17:53

## 2020-10-14 RX ADMIN — ROCURONIUM BROMIDE 20 MG: 10 INJECTION INTRAVENOUS at 14:50

## 2020-10-14 RX ADMIN — AMLODIPINE BESYLATE 10 MG: 10 TABLET ORAL at 21:59

## 2020-10-14 RX ADMIN — SODIUM CHLORIDE, SODIUM LACTATE, POTASSIUM CHLORIDE, AND CALCIUM CHLORIDE: 600; 310; 30; 20 INJECTION, SOLUTION INTRAVENOUS at 11:15

## 2020-10-14 RX ADMIN — EPHEDRINE SULFATE 10 MG: 50 INJECTION, SOLUTION INTRAMUSCULAR; INTRAVENOUS; SUBCUTANEOUS at 15:19

## 2020-10-14 RX ADMIN — ONDANSETRON 4 MG: 2 INJECTION INTRAMUSCULAR; INTRAVENOUS at 13:33

## 2020-10-14 RX ADMIN — EPHEDRINE SULFATE 10 MG: 50 INJECTION, SOLUTION INTRAMUSCULAR; INTRAVENOUS; SUBCUTANEOUS at 15:47

## 2020-10-14 RX ADMIN — EPHEDRINE SULFATE 10 MG: 50 INJECTION, SOLUTION INTRAMUSCULAR; INTRAVENOUS; SUBCUTANEOUS at 13:05

## 2020-10-14 RX ADMIN — DEXAMETHASONE SODIUM PHOSPHATE 8 MG: 4 INJECTION, SOLUTION INTRAMUSCULAR; INTRAVENOUS at 13:12

## 2020-10-14 RX ADMIN — LIDOCAINE HYDROCHLORIDE 20 MG: 20 INJECTION, SOLUTION INTRAVENOUS at 12:58

## 2020-10-14 RX ADMIN — SODIUM CHLORIDE: 9 INJECTION, SOLUTION INTRAVENOUS at 20:24

## 2020-10-14 RX ADMIN — ACETAMINOPHEN 650 MG: 325 TABLET ORAL at 21:59

## 2020-10-14 RX ADMIN — SUGAMMADEX 200 MG: 100 INJECTION, SOLUTION INTRAVENOUS at 16:02

## 2020-10-14 RX ADMIN — PROPOFOL 160 MG: 10 INJECTION, EMULSION INTRAVENOUS at 12:56

## 2020-10-14 RX ADMIN — FENTANYL CITRATE 50 MCG: 50 INJECTION INTRAMUSCULAR; INTRAVENOUS at 13:24

## 2020-10-14 RX ADMIN — PROPOFOL 40 MG: 10 INJECTION, EMULSION INTRAVENOUS at 12:58

## 2020-10-14 RX ADMIN — EPHEDRINE SULFATE 10 MG: 50 INJECTION, SOLUTION INTRAMUSCULAR; INTRAVENOUS; SUBCUTANEOUS at 15:31

## 2020-10-14 RX ADMIN — SODIUM CHLORIDE, SODIUM LACTATE, POTASSIUM CHLORIDE, AND CALCIUM CHLORIDE: 600; 310; 30; 20 INJECTION, SOLUTION INTRAVENOUS at 15:21

## 2020-10-14 RX ADMIN — ROCURONIUM BROMIDE 50 MG: 10 INJECTION INTRAVENOUS at 13:46

## 2020-10-14 RX ADMIN — DOCUSATE SODIUM 50 MG AND SENNOSIDES 8.6 MG 1 TABLET: 8.6; 5 TABLET, FILM COATED ORAL at 21:59

## 2020-10-14 RX ADMIN — Medication 10 ML: at 21:59

## 2020-10-14 RX ADMIN — EPHEDRINE SULFATE 10 MG: 50 INJECTION, SOLUTION INTRAMUSCULAR; INTRAVENOUS; SUBCUTANEOUS at 13:17

## 2020-10-14 RX ADMIN — MIDAZOLAM HYDROCHLORIDE 2 MG: 2 INJECTION, SOLUTION INTRAMUSCULAR; INTRAVENOUS at 12:46

## 2020-10-14 RX ADMIN — Medication 0.2 MG: at 13:32

## 2020-10-14 RX ADMIN — FENTANYL CITRATE 50 MCG: 50 INJECTION, SOLUTION INTRAMUSCULAR; INTRAVENOUS at 17:24

## 2020-10-14 RX ADMIN — CEFAZOLIN SODIUM 2 G: 2 SOLUTION INTRAVENOUS at 21:59

## 2020-10-14 RX ADMIN — TRANEXAMIC ACID 1000 MG: 1 INJECTION, SOLUTION INTRAVENOUS at 13:02

## 2020-10-14 ASSESSMENT — PULMONARY FUNCTION TESTS
PIF_VALUE: 16
PIF_VALUE: 14
PIF_VALUE: 14
PIF_VALUE: 16
PIF_VALUE: 16
PIF_VALUE: 15
PIF_VALUE: 16
PIF_VALUE: 16
PIF_VALUE: 15
PIF_VALUE: 12
PIF_VALUE: 16
PIF_VALUE: 17
PIF_VALUE: 15
PIF_VALUE: 15
PIF_VALUE: 17
PIF_VALUE: 16
PIF_VALUE: 16
PIF_VALUE: 17
PIF_VALUE: 16
PIF_VALUE: 16
PIF_VALUE: 17
PIF_VALUE: 13
PIF_VALUE: 15
PIF_VALUE: 1
PIF_VALUE: 15
PIF_VALUE: 16
PIF_VALUE: 16
PIF_VALUE: 15
PIF_VALUE: 16
PIF_VALUE: 6
PIF_VALUE: 16
PIF_VALUE: 16
PIF_VALUE: 13
PIF_VALUE: 16
PIF_VALUE: 16
PIF_VALUE: 15
PIF_VALUE: 15
PIF_VALUE: 16
PIF_VALUE: 16
PIF_VALUE: 5
PIF_VALUE: 14
PIF_VALUE: 15
PIF_VALUE: 17
PIF_VALUE: 17
PIF_VALUE: 5
PIF_VALUE: 15
PIF_VALUE: 16
PIF_VALUE: 13
PIF_VALUE: 16
PIF_VALUE: 17
PIF_VALUE: 16
PIF_VALUE: 16
PIF_VALUE: 14
PIF_VALUE: 16
PIF_VALUE: 17
PIF_VALUE: 13
PIF_VALUE: 1
PIF_VALUE: 5
PIF_VALUE: 16
PIF_VALUE: 15
PIF_VALUE: 1
PIF_VALUE: 16
PIF_VALUE: 15
PIF_VALUE: 17
PIF_VALUE: 0
PIF_VALUE: 17
PIF_VALUE: 14
PIF_VALUE: 16
PIF_VALUE: 4
PIF_VALUE: 16
PIF_VALUE: 16
PIF_VALUE: 17
PIF_VALUE: 16
PIF_VALUE: 20
PIF_VALUE: 16
PIF_VALUE: 15
PIF_VALUE: 14
PIF_VALUE: 7
PIF_VALUE: 17
PIF_VALUE: 16
PIF_VALUE: 15
PIF_VALUE: 16
PIF_VALUE: 16
PIF_VALUE: 17
PIF_VALUE: 16
PIF_VALUE: 15
PIF_VALUE: 15
PIF_VALUE: 16
PIF_VALUE: 16
PIF_VALUE: 15
PIF_VALUE: 0
PIF_VALUE: 12
PIF_VALUE: 11
PIF_VALUE: 17
PIF_VALUE: 0
PIF_VALUE: 6
PIF_VALUE: 16
PIF_VALUE: 1
PIF_VALUE: 16
PIF_VALUE: 17
PIF_VALUE: 16
PIF_VALUE: 15
PIF_VALUE: 14
PIF_VALUE: 16
PIF_VALUE: 16
PIF_VALUE: 15
PIF_VALUE: 2
PIF_VALUE: 15
PIF_VALUE: 16
PIF_VALUE: 15
PIF_VALUE: 0
PIF_VALUE: 15
PIF_VALUE: 15
PIF_VALUE: 16
PIF_VALUE: 13
PIF_VALUE: 15
PIF_VALUE: 13
PIF_VALUE: 12
PIF_VALUE: 3
PIF_VALUE: 16
PIF_VALUE: 12
PIF_VALUE: 15
PIF_VALUE: 16
PIF_VALUE: 13
PIF_VALUE: 16
PIF_VALUE: 16
PIF_VALUE: 15
PIF_VALUE: 16
PIF_VALUE: 13
PIF_VALUE: 15
PIF_VALUE: 16
PIF_VALUE: 16
PIF_VALUE: 15
PIF_VALUE: 17
PIF_VALUE: 16
PIF_VALUE: 15
PIF_VALUE: 16
PIF_VALUE: 14
PIF_VALUE: 15
PIF_VALUE: 16
PIF_VALUE: 15
PIF_VALUE: 17
PIF_VALUE: 15
PIF_VALUE: 17
PIF_VALUE: 16
PIF_VALUE: 17
PIF_VALUE: 16
PIF_VALUE: 15
PIF_VALUE: 1
PIF_VALUE: 15
PIF_VALUE: 16
PIF_VALUE: 16
PIF_VALUE: 17
PIF_VALUE: 17
PIF_VALUE: 15
PIF_VALUE: 16
PIF_VALUE: 16
PIF_VALUE: 17
PIF_VALUE: 6
PIF_VALUE: 15
PIF_VALUE: 7
PIF_VALUE: 15
PIF_VALUE: 16
PIF_VALUE: 13
PIF_VALUE: 15
PIF_VALUE: 16
PIF_VALUE: 15
PIF_VALUE: 16

## 2020-10-14 ASSESSMENT — PAIN SCALES - GENERAL
PAINLEVEL_OUTOF10: 9
PAINLEVEL_OUTOF10: 8
PAINLEVEL_OUTOF10: 6
PAINLEVEL_OUTOF10: 0

## 2020-10-14 ASSESSMENT — PAIN DESCRIPTION - PAIN TYPE: TYPE: ACUTE PAIN;SURGICAL PAIN

## 2020-10-14 ASSESSMENT — PAIN - FUNCTIONAL ASSESSMENT: PAIN_FUNCTIONAL_ASSESSMENT: 0-10

## 2020-10-14 ASSESSMENT — PAIN DESCRIPTION - ORIENTATION: ORIENTATION: LEFT

## 2020-10-14 NOTE — ANESTHESIA PRE PROCEDURE
Department of Anesthesiology  Preprocedure Note       Name:  Aby Montano   Age:  68 y.o.  :  1940                                          MRN:  [id number]         Date:  10/14/2020      Surgeon: Naif Blanc):  Audrey Manzano MD    Procedure: Procedure(s):  LEFT TOTAL KNEE REPLACEMENT    Medications prior to admission:   Prior to Admission medications    Medication Sig Start Date End Date Taking? Authorizing Provider   oxyCODONE-acetaminophen (PERCOCET) 5-325 MG per tablet Take 1-2 tablets by mouth every 4 hours as needed for Pain for up to 7 days. Intended supply: 7 days. Take lowest dose possible to manage pain. Do not take with tylenol 10/14/20 10/21/20 Yes Radha Seay MD   senna-docusate (PERICOLACE) 8.6-50 MG per tablet Take 2 tablets by mouth daily as needed for Constipation 10/14/20  Yes Radha Seay MD   ondansetron (ZOFRAN) 4 MG tablet Take 1 tablet by mouth every 8 hours as needed for Nausea or Vomiting 10/14/20  Yes Radha Seay MD   benazepril (LOTENSIN) 10 MG tablet Take 10 mg by mouth daily. Yes Historical Provider, MD   amLODIPine (NORVASC) 10 MG tablet Take 10 mg by mouth daily.    Yes Historical Provider, MD       Current medications:    Current Facility-Administered Medications   Medication Dose Route Frequency Provider Last Rate Last Dose    ceFAZolin (ANCEF) 2 g in dextrose 3 % 50 mL IVPB (duplex)  2 g Intravenous Once Audrey Manzano MD        tranexamic acid (CYKLOKAPRON) 1,000 mg in sodium chloride 0.9 % 50 mL IVPB  1,000 mg Intravenous Once Audrey Manzano MD        tranexamic acid (CYKLOKAPRON) irrigation 1,000 mg  1,000 mg Irrigation See Admin Instructions Audrey Manzano MD        ortho mix (with morphine) injection   Injection On Call Audrey Manzano MD        sodium chloride flush 0.9 % injection 10 mL  10 mL Intravenous 2 times per day Sherman Zapataa, DO        sodium chloride flush 0.9 % injection 10 mL  10 mL Intravenous PRN Sherman Daigle, DO        0.9 % sodium chloride infusion   Intravenous Continuous Kiara Bowels, DO        lactated ringers infusion   Intravenous Continuous Kiara Bowels, DO           Allergies:  No Known Allergies    Problem List:  There is no problem list on file for this patient. Past Medical History:        Diagnosis Date    Hypertension     Prolonged emergence from general anesthesia        Past Surgical History:        Procedure Laterality Date    COLONOSCOPY      COLONOSCOPY  10/24/2013       Social History:    Social History     Tobacco Use    Smoking status: Never Smoker    Smokeless tobacco: Never Used   Substance Use Topics    Alcohol use: Yes     Alcohol/week: 3.0 standard drinks     Types: 3 Glasses of wine per week                                Counseling given: Not Answered      Vital Signs (Current):   Vitals:    10/09/20 0905 10/14/20 1122 10/14/20 1123   BP:   (!) 162/79   Pulse:   59   Resp:   16   Temp:   98 °F (36.7 °C)   TempSrc:   Oral   SpO2:   96%   Weight: 194 lb (88 kg) 194 lb (88 kg) 194 lb (88 kg)   Height: 6' (1.829 m) 6' (1.829 m) 6' (1.829 m)                                              BP Readings from Last 3 Encounters:   10/14/20 (!) 162/79   10/24/13 143/76       NPO Status:                                                                                 BMI:   Wt Readings from Last 3 Encounters:   10/14/20 194 lb (88 kg)   10/13/20 194 lb 0.1 oz (88 kg)   07/23/20 195 lb 1.7 oz (88.5 kg)     Body mass index is 26.31 kg/m².     CBC:   Lab Results   Component Value Date    WBC 7.1 10/06/2020    RBC 4.94 10/06/2020    HGB 15.1 10/06/2020    HCT 45.5 10/06/2020    MCV 92.2 10/06/2020    RDW 13.8 10/06/2020     10/06/2020       CMP:   Lab Results   Component Value Date     10/06/2020    K 4.2 10/06/2020     10/06/2020    CO2 25 10/06/2020    BUN 17 10/06/2020    CREATININE 0.9 10/06/2020    GFRAA >60 10/06/2020    LABGLOM >60 10/06/2020    GLUCOSE 95 10/06/2020    PROT 7.3 10/06/2020    CALCIUM 9.7 10/06/2020    BILITOT 0.4 10/06/2020    ALKPHOS 59 10/06/2020    AST 23 10/06/2020    ALT 18 10/06/2020       POC Tests: No results for input(s): POCGLU, POCNA, POCK, POCCL, POCBUN, POCHEMO, POCHCT in the last 72 hours. Coags:   Lab Results   Component Value Date    PROTIME 11.8 10/06/2020    INR 1.02 10/06/2020    APTT 38.2 10/06/2020       HCG (If Applicable): No results found for: PREGTESTUR, PREGSERUM, HCG, HCGQUANT     ABGs: No results found for: PHART, PO2ART, KDO5VFU, TWE5BCR, BEART, H2BFZJNM     Type & Screen (If Applicable):  No results found for: LABABO, LABRH    Drug/Infectious Status (If Applicable):  No results found for: HIV, HEPCAB    COVID-19 Screening (If Applicable):   Lab Results   Component Value Date    COVID19 Not Detected 10/12/2020         Anesthesia Evaluation  Patient summary reviewed and Nursing notes reviewed no history of anesthetic complications:   Airway: Mallampati: I  TM distance: >3 FB   Neck ROM: full  Mouth opening: > = 3 FB Dental: normal exam         Pulmonary:Negative Pulmonary ROS                              Cardiovascular:    (+) hypertension:,         Rhythm: regular  Rate: normal                    Neuro/Psych:   Negative Neuro/Psych ROS              GI/Hepatic/Renal: Neg GI/Hepatic/Renal ROS            Endo/Other:    (+) : arthritis: OA., .                 Abdominal:           Vascular: negative vascular ROS. Anesthesia Plan      general     ASA 2       Induction: intravenous. MIPS: Prophylactic antiemetics administered. Anesthetic plan and risks discussed with patient. Plan discussed with CRNA.                   Brody Perez DO   10/14/2020

## 2020-10-14 NOTE — BRIEF OP NOTE
Brief Postoperative Note      Patient: Zarina Duggan  YOB: 1940  MRN: [id number]    Date of Procedure: 10/14/2020    Pre-Op Diagnosis: Primary osteoarthritis of left knee [M17.12]    Post-Op Diagnosis: Same       Procedure(s):  LEFT TOTAL KNEE REPLACEMENT    Surgeon(s):  MD Ines Staton MD Mitzie Glasser, DO    Assistant:  Surgical Assistant: Marlena Givens  Physician Assistant: Pilar Valencia PA-C    Anesthesia: General    Estimated Blood Loss (mL): less than 524     Complications: None    Specimens:   ID Type Source Tests Collected by Time Destination   A : LEFT KNEE SYNOVIUM Tissue Tissue SURGICAL PATHOLOGY Zuly Payne MD 10/14/2020 1342        Implants:  * No implants in log *      Drains: * No LDAs found *    Findings: refer to formal operative report    Electronically signed by Ines Smith MD on 10/14/2020 at 3:34 PM

## 2020-10-14 NOTE — PROGRESS NOTES
PACU Transfer Note    Current Allergies: Patient has no known allergies. Pt meets criteria as per Zoraida Score and ASPAN Standards to transfer to next phase of care. Vitals:    10/14/20 1945   BP: 101/78   Pulse: 80   Resp: 17   Temp: 97.1 °F (36.2 °C)   SpO2: 98%     BP within   20% of pt's admitting BP as per ZORAIDA SCORE    SpO2: 96 % 2L NC        Intake/Output Summary (Last 24 hours) at 10/14/2020 1946  Last data filed at 10/14/2020 1914  Gross per 24 hour   Intake 2920 ml   Output 400 ml   Net 2520 ml       Pain assessment:  none    Pain Level: 0    No skin issues noted. Is patient incontinent: no       Handoff report given to GILBERTO Hobson.    Family updated and directed to pt room      10/14/2020 7:46 PM

## 2020-10-14 NOTE — ANESTHESIA POSTPROCEDURE EVALUATION
Department of Anesthesiology  Postprocedure Note    Patient: Agnes Hager  MRN: [id number]  YOB: 1940  Date of evaluation: 10/14/2020  Time:  5:31 PM     Procedure Summary     Date:  10/14/20 Room / Location:  [facility] [address]Counts include 234 beds at the [facility]    Anesthesia Start:  4949 Anesthesia Stop:  2114    Procedure:  LEFT TOTAL KNEE REPLACEMENT (Left ) Diagnosis:       Primary osteoarthritis of left knee      (Primary osteoarthritis of left knee [M17.12])    Surgeon:  Jesus Ly MD Responsible Provider:  Joseph Chambers MD    Anesthesia Type:  general ASA Status:  2          Anesthesia Type: general    Blanca Phase I: Blanca Score: 7    Blanca Phase II:      Last vitals: Reviewed and per EMR flowsheets.        Anesthesia Post Evaluation    Patient location during evaluation: PACU  Patient participation: complete - patient participated  Level of consciousness: awake and alert  Pain score: 4  Airway patency: patent  Nausea & Vomiting: no nausea and no vomiting  Complications: no  Cardiovascular status: hemodynamically stable  Respiratory status: acceptable  Hydration status: euvolemic

## 2020-10-14 NOTE — H&P
[address] [facility] [address] Same Day Surgery Update H & P  Department of General Surgery   Surgical Service   Pre-operative History and Physical  Last H & P within the last 30 days. DIAGNOSIS:   Primary osteoarthritis of left knee [M17.12]    Procedure(s):  LEFT TOTAL KNEE REPLACEMENT     HISTORY OF PRESENT ILLNESS:   Patient has chronic left knee pain. The symptoms have been recalcitrant to conservative treatment and the patient presents today for the above procedure. Covid 19:  Patient denies fever, chills, cough or known exposure to Covid-19. Patient reports they have been quarantined at home since Covid-19 test.      Past Medical History:        Diagnosis Date    Hypertension     Prolonged emergence from general anesthesia      Past Surgical History:        Procedure Laterality Date    COLONOSCOPY      COLONOSCOPY  10/24/2013     Past Social History:  Social History     Socioeconomic History    Marital status:      Spouse name: None    Number of children: None    Years of education: None    Highest education level: None   Occupational History    None   Social Needs    Financial resource strain: None    Food insecurity     Worry: None     Inability: None    Transportation needs     Medical: None     Non-medical: None   Tobacco Use    Smoking status: Never Smoker    Smokeless tobacco: Never Used   Substance and Sexual Activity    Alcohol use:  Yes     Alcohol/week: 3.0 standard drinks     Types: 3 Glasses of wine per week    Drug use: Never    Sexual activity: None   Lifestyle    Physical activity     Days per week: None     Minutes per session: None    Stress: None   Relationships    Social connections     Talks on phone: None     Gets together: None     Attends Worship service: None     Active member of club or organization: None     Attends meetings of clubs or organizations: None     Relationship status: None    Intimate partner violence     Fear of current or ex partner: None     Emotionally abused: None     Physically abused: None     Forced sexual activity: None   Other Topics Concern    None   Social History Narrative    None         Medications Prior to Admission:      Prior to Admission medications    Medication Sig Start Date End Date Taking? Authorizing Provider   oxyCODONE-acetaminophen (PERCOCET) 5-325 MG per tablet Take 1-2 tablets by mouth every 4 hours as needed for Pain for up to 7 days. Intended supply: 7 days. Take lowest dose possible to manage pain. Do not take with tylenol 10/14/20 10/21/20 Yes Narciso Chong MD   senna-docusate (PERICOLACE) 8.6-50 MG per tablet Take 2 tablets by mouth daily as needed for Constipation 10/14/20  Yes Narciso Chong MD   ondansetron (ZOFRAN) 4 MG tablet Take 1 tablet by mouth every 8 hours as needed for Nausea or Vomiting 10/14/20  Yes Narciso Chong MD   benazepril (LOTENSIN) 10 MG tablet Take 10 mg by mouth daily. Yes Historical Provider, MD   amLODIPine (NORVASC) 10 MG tablet Take 10 mg by mouth daily. Yes Historical Provider, MD         Allergies:  Patient has no known allergies. PHYSICAL EXAM:      BP (!) 162/79   Pulse 59   Temp 98 °F (36.7 °C) (Oral)   Resp 16   Ht 6' (1.829 m)   Wt 194 lb (88 kg)   SpO2 96%   BMI 26.31 kg/m²      Airway:  Airway patent with no audible stridor    Heart:  Regular rate and rhythm, No murmur noted    Lungs:  No increased work of breathing, good air exchange, clear to auscultation bilaterally, no crackles or wheezing    Abdomen:  Soft, non-distended, non-tender, normal active bowel sounds, no masses palpated    ASSESSMENT AND PLAN    Patient is a 68 y.o. male with above specified procedure planned. 1.  Patient seen and focused exam done today- no new changes since last physical exam on 10-6-2020    2. Access to ancillary services are available per request of the provider.     Gayatri Martins     10/14/2020

## 2020-10-15 VITALS
OXYGEN SATURATION: 96 % | SYSTOLIC BLOOD PRESSURE: 149 MMHG | HEIGHT: 72 IN | RESPIRATION RATE: 16 BRPM | WEIGHT: 194 LBS | HEART RATE: 76 BPM | TEMPERATURE: 97.8 F | BODY MASS INDEX: 26.28 KG/M2 | DIASTOLIC BLOOD PRESSURE: 71 MMHG

## 2020-10-15 LAB
HCT VFR BLD CALC: 39 % (ref 40.5–52.5)
HEMOGLOBIN: 13.1 G/DL (ref 13.5–17.5)

## 2020-10-15 PROCEDURE — 97535 SELF CARE MNGMENT TRAINING: CPT

## 2020-10-15 PROCEDURE — 97116 GAIT TRAINING THERAPY: CPT

## 2020-10-15 PROCEDURE — 97161 PT EVAL LOW COMPLEX 20 MIN: CPT

## 2020-10-15 PROCEDURE — 97166 OT EVAL MOD COMPLEX 45 MIN: CPT

## 2020-10-15 PROCEDURE — 36415 COLL VENOUS BLD VENIPUNCTURE: CPT

## 2020-10-15 PROCEDURE — 85018 HEMOGLOBIN: CPT

## 2020-10-15 PROCEDURE — 6370000000 HC RX 637 (ALT 250 FOR IP): Performed by: STUDENT IN AN ORGANIZED HEALTH CARE EDUCATION/TRAINING PROGRAM

## 2020-10-15 PROCEDURE — 85014 HEMATOCRIT: CPT

## 2020-10-15 PROCEDURE — 2580000003 HC RX 258: Performed by: STUDENT IN AN ORGANIZED HEALTH CARE EDUCATION/TRAINING PROGRAM

## 2020-10-15 PROCEDURE — 97530 THERAPEUTIC ACTIVITIES: CPT

## 2020-10-15 RX ADMIN — ASPIRIN 81 MG: 81 TABLET, COATED ORAL at 07:54

## 2020-10-15 RX ADMIN — DOCUSATE SODIUM 50 MG AND SENNOSIDES 8.6 MG 1 TABLET: 8.6; 5 TABLET, FILM COATED ORAL at 07:54

## 2020-10-15 RX ADMIN — Medication 10 ML: at 07:54

## 2020-10-15 RX ADMIN — AMLODIPINE BESYLATE 10 MG: 10 TABLET ORAL at 07:54

## 2020-10-15 ASSESSMENT — PAIN SCALES - GENERAL
PAINLEVEL_OUTOF10: 0
PAINLEVEL_OUTOF10: 2

## 2020-10-15 NOTE — PLAN OF CARE
Problem: Falls - Risk of:  Goal: Will remain free from falls  Description: Will remain free from falls  Outcome: Ongoing  Note: Patient has remained free from falls. Fall precautions in place, call light within reach, bed alarm on, bed in lowest position, and non skid socks on. Goal: Absence of physical injury  Description: Absence of physical injury  Outcome: Ongoing  Note: Patient has remained free from physical injury. Fall precautions are in place and patients needs are being met.

## 2020-10-15 NOTE — CARE COORDINATION
Case Management Assessment            Discharge Note                    Date / Time of Note: 10/15/2020 10:16 AM                  Discharge Note Completed by: Asad Campbell    Patient Name: Briana Scott   YOB: 1940  Diagnosis: Primary osteoarthritis of left knee [M17.12]  Osteoarthritis of left knee, unspecified osteoarthritis type [M17.12]  Osteoarthritis of left knee, unspecified osteoarthritis type [M17.12]   Date / Time: 10/14/2020 10:27 AM    Current PCP: Pavel Novoa MD  Clinic patient: No    Hospitalization in the last 30 days: No    Advance Directives:  Code Status: Full Code  [place] DNR form completed and on chart: Not Indicated    Financial:  Payor: Yousuf Preciado / Plan: Brady Gómez PPO / Product Type: Medicare /      Pharmacy:    [facility] , [address] [place] [address] [phone] Kajal Taveras [phone]  FELISHA Baez   Phone: [phone] Fax: [phone]      Assistance purchasing medications?: Potential Assistance Purchasing Medications: No  Assistance provided by Case Management: None at this time    Does patient want to participate in local refill/ meds to beds program?: No    Meds To Beds General Rules:  1. Can ONLY be done Monday- Friday between 8:30am-5pm  2. Prescription(s) must be in pharmacy by 3pm to be filled same day  3. Copy of patient's insurance/ prescription drug card and patient face sheet must be sent along with the prescription(s)  4. Cost of Rx cannot be added to hospital bill. If financial assistance is needed, please contact unit  or ;  or  CANNOT provide pharmacy voucher for patients co-pays  5.  Patients can then  the prescription on their way out of the hospital at discharge, or pharmacy can deliver to the bedside if staff is available. (payment due at time of pick-up or delivery - cash, check, or card accepted)     Able to afford home medications/ co-pay costs: Yes    ADLS:  Current PT AM-PAC Score:   /24  Current OT AM-PAC Score: 22 /24      DISCHARGE Disposition: Home- No Services Needed    LOC at discharge: Not Applicable  [place] Completed: Not Indicated    Notification completed in HENS/PAS?:  Not Applicable    IMM Completed:   No    Transportation:  Transportation PLAN for discharge: family   Mode of Transport: Private Car  Home Care:  Home Care ordered at discharge: No    Additional CM Notes: Patient will return home today. No DMEs needed. Patient will go to outpatient therapy for follow up and see Dr. Maureen Tabares for follow up as well. Already has following DMEs at home:Rahul ashley [place], [address] [facility], [employer] [facility], [address] Able to stay on first level of his house. Electronically signed by Luciano Jacobs RN on 10/15/2020 at 10:18 AM      The Plan for Transition of Care is related to the following treatment goals of Primary osteoarthritis of left knee [M17.12]  Osteoarthritis of left knee, unspecified osteoarthritis type [M17.12]  Osteoarthritis of left knee, unspecified osteoarthritis type [M17.12]    The Patient and/or patient representative Victoria Mendez and his family were provided with a choice of provider and agrees with the discharge plan Yes    Freedom of choice list was provided with basic dialogue that supports the patient's individualized plan of care/goals and shares the quality data associated with the providers.  Yes    Care Transitions patient: No    Luciano Jacobs RN  [facility]  Case Management Department  Ph: [phone]

## 2020-10-15 NOTE — PROGRESS NOTES
Physical Therapy    Facility/Department: [facility]  Initial Assessment and treatment    NAME: Elena Lopez  : 1940  MRN: [id number]    Date of Service: 10/15/2020    Discharge Recommendations: Elena Lopez scored a 20/24 on the AM-PAC short mobility form. Current research shows that an AM-PAC score of 18 or greater is typically associated with a discharge to the patient's home setting. Based on the patient's AM-PAC score and their current functional mobility deficits, it is recommended that the patient have 2-3 sessions per week of Physical Therapy at d/c to increase the patient's independence. At this time, this patient demonstrates the endurance and safety to discharge home with  OP services and a follow up treatment frequency of 2-3x/wk. Please see assessment section for further patient specific details. If patient discharges prior to next session this note will serve as a discharge summary. Please see below for the latest assessment towards goals. PT Equipment Recommendations  Equipment Needed: No    Assessment   Body structures, Functions, Activity limitations: Decreased ROM; Decreased functional mobility   Assessment: Pt is a 67 y/o male who is s/p L total knee replacement on 10/14. Pt demonstrates transfers and ambulation with SBA progressing to supervision with a RW. Pt is able to ambulate and navigate stairs without any LOB. Pt instructed on taking his time with mobility at home for safety. Pt lives at home with his wife and plans to have family available to assist at home. Will follow pt while in the hospital. Rec 24 hr assist at home. Treatment Diagnosis: impaired functional mobility with gait and transfers  Prognosis: Excellent  Decision Making: Low Complexity  PT Education: Goals;PT Role;General Safety;Gait Training;Transfer Training; Adaptive Device Training;Functional Mobility Training;Plan of Care;Weight-bearing Education  Patient Education: Pt demonstrated and verbalized understanding. REQUIRES PT FOLLOW UP: Yes  Activity Tolerance  Activity Tolerance: Patient Tolerated treatment well       Patient Diagnosis(es): The primary encounter diagnosis was Status post total left knee replacement. A diagnosis of Primary osteoarthritis of left knee was also pertinent to this visit. has a past medical history of Hypertension and Prolonged emergence from general anesthesia. has a past surgical history that includes Colonoscopy; Colonoscopy (10/24/2013); and Total knee arthroplasty (Left, 10/14/2020). Restrictions  Position Activity Restriction  Other position/activity restrictions: FWBAT. Please keep consistent ice on knee. Please place a pillow under the patient's HEEL, not knee, to encourage knee extension to straight. Thank you. Vision/Hearing  Vision: Impaired  Vision Exceptions: Wears glasses for distance  Hearing: Within functional limits     Subjective  General  Chart Reviewed: Yes  Patient assessed for rehabilitation services?: Yes  Additional Pertinent Hx: Pt is a 69 y/o male who is s/p L total knee replacement on 10/14. PMHx: HTN  Family / Caregiver Present: No  Referring Practitioner: Silvio Garrett MD  Referral Date : 10/14/20  Diagnosis: Primary OA L knee  Subjective  Subjective: Pt found sitting in chair, agreeable to PT.   Pain Screening  Patient Currently in Pain: Yes(L knee, positioned pt with ice packs at end of session)         Orientation  Orientation  Overall Orientation Status: Within Normal Limits(Pt oriented to self, time, and place)  Social/Functional History  Social/Functional History  Lives With: Spouse  Type of Home: House  Home Layout: Two level, Bed/Bath upstairs, Able to Live on Main level with bedroom/bathroom(Full bath on main level)  Home Access: Stairs to enter with rails  Entrance Stairs - Number of Steps: 4  Entrance Stairs - Rails: Both  Bathroom Shower/Tub: Tub/Shower unit  Bathroom Toilet: Standard  Bathroom Equipment: Grab bars in Treatment Recommendations: ROM, Stair training, Functional Mobility Training, Gait Training, Home Exercise Program  Safety Devices  Type of devices: Left in chair, Call light within reach, Chair alarm in place, Gait belt, Nurse notified    G-Code       OutComes Score                                                  AM-PAC Score  AM-PAC Inpatient Mobility Raw Score : 20 (10/15/20 1034)  AM-PAC Inpatient T-Scale Score : 47.67 (10/15/20 1034)  Mobility Inpatient CMS 0-100% Score: 35.83 (10/15/20 1034)  Mobility Inpatient CMS G-Code Modifier : Alverna Reas (10/15/20 1034)          Goals  Short term goals  Time Frame for Short term goals: discharge  Short term goal 1: sit <> stand modified independent with RW  Short term goal 2: Pt will ambulate 300ft modified independent with RW  Short term goal 3: Pt will ascend and descend 4 stairs with B HRs independently  Short term goal 4: Pt will perform 10 reps of LE exercise independently  Short term goal 5: L knee extension will improve to lacking 4 degrees, L knee flexion will improve to 82 degrees  Patient Goals   Patient goals : to return home       Therapy Time   Individual Concurrent Group Co-treatment   Time In 0912         Time Out 0944         Minutes 32              Timed Code Treatment Minutes:   17    Total Treatment Minutes:  Dilma Baker 587, SPT

## 2020-10-15 NOTE — PROGRESS NOTES
Patient admitted to 26. orientated to room. No c/o pain. Neuro checks wnl. Ace wrap to LLE CDI. Fall precautions in place, call light within reach, bed alarm on, bed in lowest position, and non skid socks on. VSS. Denies needs at this time. Will continue to monitor.

## 2020-10-15 NOTE — PROGRESS NOTES
4 Eyes Admission Assessment     I agree as the admission nurse that 2 RN's have performed a thorough Head to Toe Skin Assessment on the patient. ALL assessment sites listed below have been assessed on admission. Areas assessed by both nurses: ***  [x]   Head, Face, and Ears   [x]   Shoulders, Back, and Chest  [x]   Arms, Elbows, and Hands   [x]   Coccyx, Sacrum, and Ischium  [x]   Legs, Feet, and Heels        Does the Patient have Skin Breakdown?   No         Gabriel Prevention initiated:  No   Wound Care Orders initiated:  No      [facility] nurse consulted for Pressure Injury (Stage 3,4, Unstageable, DTI, NWPT, and Complex wounds) or Gabriel score 18 or lower:  No      Nurse 1 eSignature: Electronically signed by Trinity Yanes RN on 10/14/20 at 10:28 PM EDT    **SHARE this note so that the co-signing nurse is able to place an eSignature**    Nurse 2 eSignature: {Esignature:[id number]}

## 2020-10-15 NOTE — OP NOTE
[address]                [address]                                OPERATIVE REPORT    PATIENT NAME: Charly Delarosa                    :        1940  MED REC NO:   [id number]                          ROOM:       5511  ACCOUNT NO:   [de-identified]                           ADMIT DATE: 10/14/2020  PROVIDER:     Reji Sosa MD    DATE OF PROCEDURE:  10/14/2020    PREOPERATIVE DIAGNOSIS:  Tricompartmental osteoarthritis, left knee,  with varus malalignment. POSTOPERATIVE DIAGNOSIS:  Tricompartmental osteoarthritis, left knee,  with varus malalignment. OPERATIONS PERFORMED:  Left total knee joint replacement with Journey II  #7 femur, #6 tibia, 13-mm polyethylene insert, one-third medial  collateral ligament release distally, Z-plasty lateral release, 35 x 7  mm patella insert. SURGEON:  Reji Sosa MD    FIRST ASSISTANT:  Marcos Westfall DO    ANESTHESIA:  General.    OPERATIVE INDICATIONS:  This patient understood the associated risks,  benefits and consented to the operative procedure after having failing  an extended nonoperative treatment program.  He was advised that he had  severe varus malalignment and would require soft tissue releases and  protection postsurgical.    Overall, the procedure went excellent. One-third of the MCL did have to  be released to allow for an additional 3 mm of medial gap due to  contracture of the medial structures, marked tightness to the medial and  lateral retinaculum with a Z-plasty lateral retinacular release. The  [employer] patient-specific instrumentation system was utilized and  provided an excellent anatomic fit and the computerized program was  followed throughout in the knee. Triple antibiotics were utilized plus  tranexamic acid plus the local Orthomix block. This should be a very  good functional knee for this patient.     OPERATIVE PROCEDURE:  This patient was placed in the supine position  upon the operating room table and after satisfactory level of general  anesthesia, the left knee was prepped and draped to a sterile surgical  field after identification of the signed limb and the timeout procedure. Under tourniquet control, a medial parapatellar incision was made with  dissection carried through the skin and subcutaneous tissue, everting  the patella which was markedly sclerotic with complete loss of articular  cartilage, transecting this to a 15-mm base with three sockets and  drilling the undersurface for cement penetration and removal of all  osteophytes. Dissection was carried about the medial aspect of the tibia underneath  the medial collateral ligament with retractor placed to protect this  during the entire procedure. Similar dissection anterolaterally  removing the lateral meniscus and also retractor placed to protect the  posterolateral structures. The patient-specific cutting block provided an excellent fit to the  distal femur and the four pins were placed, distal resection performed,  #70 chamfer cutting blocks applied, five chamfer cuts made, bone  fragments removed, prosthesis placed which provided an excellent fit,  osteophytes removed and a center gouge technique performed at this time. Gap analysis showed the knee did achieve full knee extension. Additional 2 mm was built into the operative plan due to a 15-degree  flexion contracture. The tibia was cut with the patient-specific cutting blocks. The initial  cut was made. This represented a markedly asymmetric cut due to the  marked loss of bone to the medial compartment. Second cut had to be  made due to the marked tightness of the knee joint. Accordingly, the  bone wafer was removed. The knee was gapped open, all remaining  meniscus structures removed, osteophytes removed, Baker's cyst entrance  into the knee joint was electrocoagulated to prevent a ball valve effect  postoperatively.   The trial prosthesis was then placed in the knee  joint. An additional 3-mm medial gap was required and therefore,  one-third anteriorly of the MCL was released and this did provide the  additional medial gap to put in a 13 mm which provided excellent  stability throughout knee flexion, full knee extension, and excellent  gap stability at high, low, and mid flexion. The tibia was marked for rotation, the tibia gouge system utilized for  the tibia plate. The knee was then prepared for the cementing  technique, first utilizing multiple drill points on the hard sclerotic  medial tibia. Double irrigation followed by drying all the surfaces  followed eventually by the cementing technique which proceeded in a  meticulous manner on the tibia, followed by the femur, holding the knee  in full extension, followed by the patella and removing cement at each  step. With cement curing, the tourniquet was deflated, hemostasis was  meticulous and excellent, tranexamic acid utilized locally, Orthomix  block placed medially and laterally. Again, the antibiotic irrigation  technique with Chloraseptic followed by the final gap analysis followed  by a placement of the permanent 13-mm polyethylene insert. The routine closure was performed first balancing the extensor  mechanism, closing the Z-plasty lateral release to allow a normal medial  glide with interrupted 2-0 Vicryl sutures. Extensor mechanism closure  with interrupted 0 Ethibond. Dead space closure with interrupted 2-0  Vicryl followed by 3-0 and 4-0 Monocryl for the cosmetic skin closure. An Ace cotton compression dressing was applied. The patient was  returned to the recovery unit in excellent condition. The MCL has the  majority of its attachment intact and a brace will not be necessary, the  estimated blood loss at 100 mL.         Ed Thibodeaux MD    D: 10/14/2020 15:22:29       T: 10/14/2020 15:30:06     [email or web address]  Job#: [id number]     Doc#: [id number]    CC:

## 2020-10-15 NOTE — PROGRESS NOTES
Patient discharged home with family. Patient understands discharge instructions. Patient has all belongings at discharge.

## 2020-10-15 NOTE — PLAN OF CARE
Problem: Falls - Risk of:  Goal: Will remain free from falls  Description: Will remain free from falls  10/15/2020 1135 by Stacy Jaramillo RN  Outcome: Ongoing   Fall risk precautions in place. Bed is locked and in lowest position. 2/4 side rails up. Call light within reach. Fall risk Bracelet in place. Frequent checks on patient. Free from falls at this time. Will continue to monitor. Problem: Pain:  Goal: Pain level will decrease  Description: Pain level will decrease  Outcome: Ongoing   Denies pain at this time. Will continue to monitor.

## 2020-10-15 NOTE — PROGRESS NOTES
Progress Note    Patient:  Chalo Clifford  YOB: 1940     68 y.o. male    Subjective:  Patient seen and examined  No complaints or concerns  No issue overnight  Pain controlled  Denies fever, HA, CP, SOB, N/V, dysuria    Objective:   Vitals:    10/15/20 0639   BP: (!) 149/71   Pulse: 76   Resp: 16   Temp: 97.8 °F (36.6 °C)   SpO2: 96%     Gen: NAD, cooperative   LLE: Surgical dressing in place, C/D/I, appropriately tender to palpation. Compartments soft. EHL/FHL/TA/GS complex motor intact 5/5. Sural, saphenous, superificial/deep peroneal, and plantar nerve distribution sensation grossly intact. Dorsalis pedis/posterior tibial pulses 2+ with BCR. Recent Labs     10/15/20  0543   HGB 13.1*   HCT 39.0*      Meds: post op ancef, ASA 81 BID   See rec for complete list    Impression/plan: 68 y.o. male s/p L TKA, POD#1    -WB status: WBAT, encourage knee extension while in bed. Maintain dressing to knee, will manage tomorrow in outpatient physical therapy  -Pain control oral as needed, well controlled currently  -DVT ppx: ASA 81 BID  -Ice/Elevate frequently for swelling  -Incentive Spirometry use  -PT/OT today  -Plan for DC home today.  Outpatient therapy evaluation tomorrow as scheduled with Dr. Nitin Nash.  -Please page DO ortho with any questions    Shyann Barriga DO  [facility] Clinical Fellow  Cell: [phone]  7:44 AM 10/15/2020

## 2020-10-15 NOTE — PROGRESS NOTES
Occupational Therapy   Occupational Therapy Initial Assessment /Treatment/Discharge  Date: 10/15/2020   Patient Name: Milagros Guardado  MRN: [id number]     : 1940    Date of Service: 10/15/2020    Discharge Recommendations:    Milagros Guardado scored a 22/24 on the AM-PAC ADL Inpatient form. At this time, no further OT is recommended upon discharge. Recommend patient returns to prior setting with prior services. OT Equipment Recommendations  Equipment Needed: No    Assessment   Assessment: Pt doing well POD 1 TKR. No acute OT needs identified. Pt expresses no concerns regarding discharge to home. Prognosis: Good  Decision Making: Medium Complexity  OT Education: OT Role;Plan of Care;Precautions  Patient Education: Pt verbalized/demonstrated understanding  REQUIRES OT FOLLOW UP: No  Activity Tolerance  Activity Tolerance: Patient Tolerated treatment well  Safety Devices  Safety Devices in place: Yes  Type of devices: Left in chair;Call light within reach; Chair alarm in place;Nurse notified              Restrictions  Position Activity Restriction  Other position/activity restrictions: FWBAT. Please keep consistent ice on knee. Please place a pillow under the patient's HEEL, not knee, to encourage knee extension to straight. Thank you. Subjective   General  Chart Reviewed: Yes  Patient assessed for rehabilitation services?: Yes  Additional Pertinent Hx: 10/14/2020 Lft TKR       PMH includes: HTN  Family / Caregiver Present: No  Referring Practitioner: Dr. Espinoza Antes  Diagnosis: OA Lft knee  Subjective  Subjective: Pt up in chair upon entry. Pt agreeable to activity.   Patient Currently in Pain: Denies  Pain Assessment  Pain Level: 0  Vital Signs  Patient Currently in Pain: Denies  Social/Functional History  Social/Functional History  Lives With: Spouse  Type of Home: House  Home Layout: Two level, Bed/Bath upstairs, Able to Live on Main level with bedroom/bathroom(Full bath on main level)  Home Access: Stairs to enter with rails  Entrance Stairs - Number of Steps: 4  Entrance Stairs - Rails: Both  Bathroom Shower/Tub: Tub/Shower unit  Bathroom Toilet: Standard  Bathroom Equipment: Grab bars in shower, Hand-held shower, Shower chair  Home Equipment: Rolling walker, Cane, Crutches, Long-handled shoehorn, Reacher  ADL Assistance: Independent  Homemaking Assistance: Independent  Ambulation Assistance: Independent  Transfer Assistance: Independent  Active : Yes  Occupation: Retired  Type of occupation: Teacher,  reserve  Additional Comments: No falls       Objective   Vision: Impaired  Vision Exceptions: Wears glasses for distance  Hearing: Within functional limits    Orientation  Overall Orientation Status: Within Normal Limits     Balance  Sitting Balance: Independent  Standing Balance: Supervision  Standing Balance  Time: ~3 min  Activity: bathroom mobility/activity  Functional Mobility  Functional - Mobility Device: Rolling Walker  Activity: To/from bathroom  Assist Level: Supervision  Toilet Transfers  Toilet - Technique: Ambulating  Equipment Used: Standard toilet(grab ar)  Toilet Transfer: Supervision  ADL  Feeding: Independent  Grooming: Independent  UE Dressing: Independent  LE Dressing: Minimal assistance(assist for Lft sock only)  Toileting: (denied need)  Tone RUE  RUE Tone: Normotonic  Tone LUE  LUE Tone: Normotonic  Coordination  Movements Are Fluid And Coordinated: Yes        Transfers  Stand Step Transfers: Supervision  Sit to stand: Supervision  Stand to sit: Supervision     Cognition  Overall Cognitive Status: WNL                 LUE AROM (degrees)  LUE AROM : WFL  RUE AROM (degrees)  RUE AROM : WFL  LUE Strength  Gross LUE Strength: WFL  RUE Strength  Gross RUE Strength: WFL     Hand Dominance  Hand Dominance: Right     Treatment included ADL and transfer training.         Plan   Plan  Plan Comment: Discharge pt from acute OT    AM-PAC Score        AM-PAC Inpatient Daily Activity Raw Score: 22 (10/15/20 0922)  AM-PAC Inpatient ADL T-Scale Score : 47.1 (10/15/20 0922)  ADL Inpatient CMS 0-100% Score: 25.8 (10/15/20 5562)  ADL Inpatient CMS G-Code Modifier : CJ (10/15/20 1785)    Goals    No goals indicated       Therapy Time   Individual Concurrent Group Co-treatment   Time In 0805         Time Out 0847         Minutes 42         Timed Code Treatment Minutes: [address], [address], OTR/L [address]

## 2020-10-16 ENCOUNTER — TELEPHONE (OUTPATIENT)
Dept: ORTHOPEDIC SURGERY | Age: 80
End: 2020-10-16

## 2020-10-16 ENCOUNTER — HOSPITAL ENCOUNTER (OUTPATIENT)
Dept: PHYSICAL THERAPY | Age: 80
Setting detail: THERAPIES SERIES
Discharge: HOME | End: 2020-10-16
Payer: MEDICARE

## 2020-10-16 PROCEDURE — 97016 VASOPNEUMATIC DEVICE THERAPY: CPT | Performed by: PHYSICAL THERAPIST

## 2020-10-16 PROCEDURE — 97530 THERAPEUTIC ACTIVITIES: CPT | Performed by: PHYSICAL THERAPIST

## 2020-10-16 PROCEDURE — 97110 THERAPEUTIC EXERCISES: CPT | Performed by: PHYSICAL THERAPIST

## 2020-10-16 PROCEDURE — 97140 MANUAL THERAPY 1/> REGIONS: CPT | Performed by: PHYSICAL THERAPIST

## 2020-10-16 NOTE — FLOWSHEET NOTE
waking in timely fashion PO. Denies nausea/ vomiting/ elevated temperature/ calf pain. States no real exercise PO/ not wearing R URI hose, however, brought the R to be placed on after bandage and dressing change. OBJECTIVE:      10/16/20  ROM PROM AROM Overpressure Comment     L R L R L R     Flexion      140         Extension     -10 0                                              **Knee flexion ROM measure deferred to NPV    10/16/20  Strength L R Comment   Quad 2 5  FLORENCIA 0°   Hamstring       Gastroc       Hip  flexion       Hip abd                                10/16/20  Special Test Results/Comment   Homans Negative   Temperature Negative         10/13/20  Girth L R   Mid Patella 40.5 41.0   Suprapatellar 41.0 41.0   5cm above NT - clothing NT - clothing   15cm above NT - clothing NT - clothing    **Girth updates deferred to NPV    Reflexes/Sensation:               []?Dermatomes/Myotomes intact               []?Reflexes equal and normal bilaterally              [x]? Other: NT     Joint mobility:                []?Normal                       [x]? Hypo: decreased patellar mobility              []?Hyper     Palpation: Moderate generalized due to PO pain and swelling     Functional Mobility/Transfers: Requires moderate assist of 1 for L LE transfer from floor to table     Posture: PO neutral varus (partial MCL release during TKR)     Bandages/Dressings/Incisions: PO bandage and dressing change;  Aquacell intact with mild distal bleeding into the gauze/ stayed within the boundaries of the dressing; moderate ecchymosis, however (-) redness/ area dry     Gait: Rolling walker with ~25% PWB; antalgic with limited stance L and limited swing flexion     Orthopedic Special Tests:  BP: 128/62 mmHg  Pulse O2: 90-92%  HR: 48-52  @~12:10pm patient sitting upright on plinth in no apparent distress; requested a cup of water; when returned with water, patient developed a blank stare and was initially unresponsive to verbal and pre-op and post-op instructions including but not limited to R.I.C.E., post-op HEP, DVT prevention, warning signs of infection and DVT, and on activity limitations. 10/16/20: Patient education regarding signs/ symptoms for MD phone call vs ER/ instructions for HEP/ instructions for [facility] ICEMAN    HEP:  Patient instructed on HEP on this date with handout provided and all questions answered. Patient was instructed to contact PT with any complications or questions about HEP moving forward. Patient verbally stated she/he understood. Updated 10/13/20  Freebeepay CODE: [id number]; see attached for 10/16/20 update on paper    Therapeutic Exercise and NMR EXR  [x] (46336) Provided verbal/tactile cueing for activities related to strengthening, flexibility, endurance, ROM for improvements in LE, proximal hip, and core control with self care, mobility, lifting, ambulation. [x] (95182) Provided verbal/tactile cueing for activities related to improving balance, coordination, kinesthetic sense, posture, motor skill, proprioception  to assist with LE, proximal hip, and core control in self care, mobility, lifting, ambulation and eccentric single leg control.      NMR and Therapeutic Activities:    [x] (47733 or 85066) Provided verbal/tactile cueing for activities related to improving balance, coordination, kinesthetic sense, posture, motor skill, proprioception and motor activation to allow for proper function of core, proximal hip and LE with self care and ADLs  [x] (43380) Gait Re-education- Provided training and instruction to the patient for proper LE, core and proximal hip recruitment and positioning and eccentric body weight control with ambulation re-education including up and down stairs     Home Exercise Program:    [x] (56364) Reviewed/Progressed HEP activities related to strengthening, flexibility, endurance, ROM of core, proximal hip and LE for functional self-care, mobility, lifting and ambulation/stair navigation   [x] (74499)Reviewed/Progressed HEP activities related to improving balance, coordination, kinesthetic sense, posture, motor skill, proprioception of core, proximal hip and LE for self care, mobility, lifting, and ambulation/stair navigation      Manual Treatments:  PROM / STM / Oscillations-Mobs:  G-I, II, III, IV (PA's, Inf., Post.)  [x] (75492) Provided manual therapy to mobilize LE, proximal hip and/or LS spine soft tissue/joints for the purpose of modulating pain, promoting relaxation,  increasing ROM, reducing/eliminating soft tissue swelling/inflammation/restriction, improving soft tissue extensibility and allowing for proper ROM for normal function with self care, mobility, lifting and ambulation. 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 Independent in HEP and progression per patient tolerance, in order to prevent re-injury. [] Progressing: [] Met: [] Not Met: [] Adjusted   2. Patient will have a decrease in pain to facilitate improvement in movement, function, and ADLs as indicated by Functional Deficits. [] Progressing: [] Met: [] Not Met: [] Adjusted    Long Term Goals: To be achieved in: 12 weeks  1. Disability index score of 21% or less for the LEFS to assist with reaching prior level of function.    [] Progressing: [] Met: [] Not Met: [] Adjusted  2. Patient will demonstrate increased AROM to 0-125 or greater to allow for proper joint functioning as indicated by patients Functional Deficits. [] Progressing: [] Met: [] Not Met: [] Adjusted  3. Patient will demonstrate an increase in Strength to 5/5 in BLE to allow for proper functional mobility as indicated by patients Functional Deficits. [] Progressing: [] Met: [] Not Met: [] Adjusted  4. Patient will return to ADL and other functional activities without increased symptoms or restriction. [] Progressing: [] Met: [] Not Met: [] Adjusted  5. Patient will be able to ascend/descend 10 stairs without pain in left knee. (patient specific functional goal)    [] Progressing: [] Met: [] Not Met: [] Adjusted    Overall Progression Towards Functional goals/ Treatment Progress Update:  [] Patient is progressing as expected towards functional goals listed. [] Progression is slowed due to complexities/Impairments listed. [] Progression has been slowed due to co-morbidities.   [x] Plan just implemented, too soon to assess goals progression <30days   [] Goals require adjustment due to lack of progress  [] Patient is not progressing as expected and requires additional follow up with physician  [] Other    Prognosis for POC: [x] Good [] Fair  [] Poor    Patient requires continued skilled intervention: [x] Yes  [] No    Treatment/Activity Tolerance:  [] Patient able to complete treatment  [] Patient limited by fatigue  [x] Patient limited by pain     [x] Patient limited by other medical complications  [x] Other: See above      PLAN: See eval  [] Continue per plan of care [] Alter current plan (see comments above)  [x] Plan of care initiated [] Hold pending MD visit [] Discharge      Electronically signed by:  Uriah Rivero PT    Note: If patient does not return for scheduled/ recommended follow up visits, this note will serve as a discharge from care along with most recent update on progress.

## 2020-10-16 NOTE — TELEPHONE ENCOUNTER
I, Tate Calloway PA-C, spoke with Mr. Melissa Ramirez today (10/16/2020) at 1998. We discussed his post-op course as well as his syncopal episode during his PT appointment today. The patient states that he is feeling much better. He believes it was a combination of not eating enough, nausea, not taking narcotic pain medication before, as well as the temperature (being too warm) in the PT clinic as he was wearing sweats while conducting his exercises. He felt comfortable going home and was able to eat, drink water, and take a nap after which he states he felt that his condition greatly improved. The patient was educated on maintaining a good diet to assist in his health and healing. In addition, he was educated on the most appropriate utilization of his pain medication. His wife affirmed that she would be around to watching him while at home. The patient was given precautions as well as signs and symptoms for which he should go immediately call 911 and go to the emergency department. The patient and his wife expressed full understanding of all instructions and agree with the plan.               Tate Calloway PA-C    Physician Assistant - [address]    10/16/20 6:29 PM

## 2020-10-18 ENCOUNTER — HOSPITAL ENCOUNTER (EMERGENCY)
Age: 80
Discharge: HOME | End: 2020-10-18
Attending: EMERGENCY MEDICINE
Payer: MEDICARE

## 2020-10-18 ENCOUNTER — APPOINTMENT (OUTPATIENT)
Dept: GENERAL RADIOLOGY | Age: 80
End: 2020-10-18
Payer: MEDICARE

## 2020-10-18 VITALS
TEMPERATURE: 98.3 F | DIASTOLIC BLOOD PRESSURE: 72 MMHG | RESPIRATION RATE: 17 BRPM | HEART RATE: 87 BPM | OXYGEN SATURATION: 94 % | SYSTOLIC BLOOD PRESSURE: 147 MMHG

## 2020-10-18 LAB
A/G RATIO: 1.3 (ref 1.1–2.2)
ALBUMIN SERPL-MCNC: 3.6 G/DL (ref 3.4–5)
ALP BLD-CCNC: 50 U/L (ref 40–129)
ALT SERPL-CCNC: 13 U/L (ref 10–40)
ANION GAP SERPL CALCULATED.3IONS-SCNC: 10 MMOL/L (ref 3–16)
AST SERPL-CCNC: 18 U/L (ref 15–37)
BASOPHILS ABSOLUTE: 0 K/UL (ref 0–0.2)
BASOPHILS RELATIVE PERCENT: 0.3 %
BILIRUB SERPL-MCNC: 0.5 MG/DL (ref 0–1)
BUN BLDV-MCNC: 13 MG/DL (ref 7–20)
CALCIUM SERPL-MCNC: 8.7 MG/DL (ref 8.3–10.6)
CHLORIDE BLD-SCNC: 99 MMOL/L (ref 99–110)
CO2: 25 MMOL/L (ref 21–32)
CREAT SERPL-MCNC: 0.8 MG/DL (ref 0.8–1.3)
EOSINOPHILS ABSOLUTE: 0.1 K/UL (ref 0–0.6)
EOSINOPHILS RELATIVE PERCENT: 1.1 %
GFR AFRICAN AMERICAN: >60
GFR NON-AFRICAN AMERICAN: >60
GLOBULIN: 2.8 G/DL
GLUCOSE BLD-MCNC: 108 MG/DL (ref 70–99)
HCT VFR BLD CALC: 33.4 % (ref 40.5–52.5)
HEMOGLOBIN: 11.4 G/DL (ref 13.5–17.5)
LIPASE: 12 U/L (ref 13–60)
LYMPHOCYTES ABSOLUTE: 1.2 K/UL (ref 1–5.1)
LYMPHOCYTES RELATIVE PERCENT: 13.8 %
MCH RBC QN AUTO: 31.9 PG (ref 26–34)
MCHC RBC AUTO-ENTMCNC: 34.1 G/DL (ref 31–36)
MCV RBC AUTO: 93.7 FL (ref 80–100)
MONOCYTES ABSOLUTE: 1 K/UL (ref 0–1.3)
MONOCYTES RELATIVE PERCENT: 11.4 %
NEUTROPHILS ABSOLUTE: 6.5 K/UL (ref 1.7–7.7)
NEUTROPHILS RELATIVE PERCENT: 73.4 %
PDW BLD-RTO: 13.4 % (ref 12.4–15.4)
PLATELET # BLD: 181 K/UL (ref 135–450)
PMV BLD AUTO: 7.5 FL (ref 5–10.5)
POTASSIUM REFLEX MAGNESIUM: 4 MMOL/L (ref 3.5–5.1)
RBC # BLD: 3.57 M/UL (ref 4.2–5.9)
SODIUM BLD-SCNC: 134 MMOL/L (ref 136–145)
TOTAL PROTEIN: 6.4 G/DL (ref 6.4–8.2)
WBC # BLD: 8.9 K/UL (ref 4–11)

## 2020-10-18 PROCEDURE — 96374 THER/PROPH/DIAG INJ IV PUSH: CPT

## 2020-10-18 PROCEDURE — 96361 HYDRATE IV INFUSION ADD-ON: CPT

## 2020-10-18 PROCEDURE — 83690 ASSAY OF LIPASE: CPT

## 2020-10-18 PROCEDURE — 99283 EMERGENCY DEPT VISIT LOW MDM: CPT

## 2020-10-18 PROCEDURE — 2580000003 HC RX 258: Performed by: PHYSICIAN ASSISTANT

## 2020-10-18 PROCEDURE — 6360000002 HC RX W HCPCS: Performed by: PHYSICIAN ASSISTANT

## 2020-10-18 PROCEDURE — 85025 COMPLETE CBC W/AUTO DIFF WBC: CPT

## 2020-10-18 PROCEDURE — 74022 RADEX COMPL AQT ABD SERIES: CPT

## 2020-10-18 PROCEDURE — 80053 COMPREHEN METABOLIC PANEL: CPT

## 2020-10-18 PROCEDURE — 6370000000 HC RX 637 (ALT 250 FOR IP): Performed by: PHYSICIAN ASSISTANT

## 2020-10-18 RX ORDER — POLYETHYLENE GLYCOL 3350 17 G/17G
17 POWDER, FOR SOLUTION ORAL ONCE
Status: COMPLETED | OUTPATIENT
Start: 2020-10-18 | End: 2020-10-18

## 2020-10-18 RX ORDER — DOCUSATE SODIUM 100 MG/1
100 CAPSULE, LIQUID FILLED ORAL 2 TIMES DAILY
Qty: 30 CAPSULE | Refills: 0 | Status: SHIPPED | OUTPATIENT
Start: 2020-10-18

## 2020-10-18 RX ORDER — POLYETHYLENE GLYCOL 3350 17 G/17G
17 POWDER, FOR SOLUTION ORAL DAILY
Qty: 1 BOTTLE | Refills: 0 | Status: SHIPPED | OUTPATIENT
Start: 2020-10-18 | End: 2020-10-25

## 2020-10-18 RX ORDER — ONDANSETRON 2 MG/ML
4 INJECTION INTRAMUSCULAR; INTRAVENOUS ONCE
Status: COMPLETED | OUTPATIENT
Start: 2020-10-18 | End: 2020-10-18

## 2020-10-18 RX ORDER — DOCUSATE SODIUM 100 MG/1
100 CAPSULE, LIQUID FILLED ORAL ONCE
Status: COMPLETED | OUTPATIENT
Start: 2020-10-18 | End: 2020-10-18

## 2020-10-18 RX ORDER — 0.9 % SODIUM CHLORIDE 0.9 %
1000 INTRAVENOUS SOLUTION INTRAVENOUS ONCE
Status: COMPLETED | OUTPATIENT
Start: 2020-10-18 | End: 2020-10-18

## 2020-10-18 RX ADMIN — SODIUM CHLORIDE 1000 ML: 9 INJECTION, SOLUTION INTRAVENOUS at 10:42

## 2020-10-18 RX ADMIN — ONDANSETRON 4 MG: 2 INJECTION INTRAMUSCULAR; INTRAVENOUS at 10:43

## 2020-10-18 RX ADMIN — POLYETHYLENE GLYCOL 3350 17 G: 17 POWDER, FOR SOLUTION ORAL at 11:13

## 2020-10-18 RX ADMIN — DOCUSATE SODIUM 100 MG: 100 CAPSULE, LIQUID FILLED ORAL at 11:13

## 2020-10-18 ASSESSMENT — ENCOUNTER SYMPTOMS
SHORTNESS OF BREATH: 0
ABDOMINAL PAIN: 0
VOMITING: 0
ABDOMINAL DISTENTION: 1
CONSTIPATION: 1
DIARRHEA: 0
EYE REDNESS: 0
BACK PAIN: 0
NAUSEA: 1

## 2020-10-18 ASSESSMENT — PAIN DESCRIPTION - PAIN TYPE: TYPE: SURGICAL PAIN

## 2020-10-18 ASSESSMENT — PAIN DESCRIPTION - LOCATION: LOCATION: KNEE

## 2020-10-18 ASSESSMENT — PAIN SCALES - GENERAL: PAINLEVEL_OUTOF10: 3

## 2020-10-18 ASSESSMENT — PAIN DESCRIPTION - ORIENTATION: ORIENTATION: LEFT

## 2020-10-18 NOTE — ED PROVIDER NOTES
[address] ENCOUNTER          PHYSICIAN ASSISTANT NOTE       Date of evaluation: 10/18/2020    Chief Complaint     Nausea and Constipation      History of Present Illness     Jesenia Talley is a 68 y.o. male with PMH of HTN, HLD, Diverticulosis who presents with Constipation. Patient had left TKR 10/14 by Dr. Vinnie Park. Patient states that he went home the next day. He reports taking two 5mg percocets every 4 hours since his discharge home. He denies taking any bowel regime. He reports no pain in left knee with resting that becomes 8-9/10 pain with ambulation. He has been sedentary with the exception of getting up to use the restroom several times per day. He states that in the past few days, his stomach has become distended. He has not had a BM since prior to the surgery on Wednesday. He took 3 dulcolax and 2 peptobismol yesterday without relief. Today, he did pass gas twice and has had some belching. He also notes acid reflux and nausea as a result. He states that he has not had much of an appetite - eating only small amts of soft foods. He denies any fevers, chills, chest pain, shortness of breath, abdominal pain, urinary complaints. Review of Systems     Review of Systems   Constitutional: Negative for chills and fever. HENT: Negative for congestion. Eyes: Negative for redness. Respiratory: Negative for shortness of breath. Cardiovascular: Negative for chest pain. Gastrointestinal: Positive for abdominal distention, constipation and nausea. Negative for abdominal pain, diarrhea and vomiting. Genitourinary: Negative for difficulty urinating. Musculoskeletal: Negative for back pain and gait problem. Skin: Negative for wound. Neurological: Negative for dizziness. Psychiatric/Behavioral: The patient is not nervous/anxious.         Past Medical, Surgical, Family, and Social History     He has a past medical history of Hypertension and Prolonged emergence from general anesthesia. He has a past surgical history that includes Colonoscopy; Colonoscopy (10/24/2013); Total knee arthroplasty (Left, 10/14/2020); and joint replacement. His family history is not on file. He reports that he has never smoked. He has never used smokeless tobacco. He reports current alcohol use of about 3.0 standard drinks of alcohol per week. He reports that he does not use drugs. Medications     Current Discharge Medication List      CONTINUE these medications which have NOT CHANGED    Details   oxyCODONE-acetaminophen (PERCOCET) 5-325 MG per tablet Take 1-2 tablets by mouth every 4 hours as needed for Pain for up to 7 days. Intended supply: 7 days. Take lowest dose possible to manage pain. Do not take with tylenol  Qty: 42 tablet, Refills: 0    Comments: Reduce doses taken as pain becomes manageable  Associated Diagnoses: Status post total left knee replacement      senna-docusate (PERICOLACE) 8.6-50 MG per tablet Take 2 tablets by mouth daily as needed for Constipation  Qty: 30 tablet, Refills: 0      ondansetron (ZOFRAN) 4 MG tablet Take 1 tablet by mouth every 8 hours as needed for Nausea or Vomiting  Qty: 30 tablet, Refills: 0      benazepril (LOTENSIN) 10 MG tablet Take 10 mg by mouth daily. amLODIPine (NORVASC) 10 MG tablet Take 10 mg by mouth daily. Allergies     He has No Known Allergies. Physical Exam     INITIAL VITALS: BP: (!) 153/73,Temp: 98.3 °F (36.8 °C), Pulse: 87, Resp: 17, SpO2: 95 %   Physical Exam  Vitals signs and nursing note reviewed. Constitutional:       General: He is not in acute distress. HENT:      Head: Normocephalic and atraumatic. Mouth/Throat:      Mouth: Mucous membranes are moist.      Pharynx: Oropharynx is clear. Eyes:      Extraocular Movements: Extraocular movements intact. Conjunctiva/sclera: Conjunctivae normal.   Neck:      Musculoskeletal: Neck supple.    Cardiovascular:      Rate and Rhythm: Normal rate and regular rhythm. Pulmonary:      Effort: Pulmonary effort is normal. No respiratory distress. Breath sounds: Normal breath sounds. No wheezing, rhonchi or rales. Abdominal:      General: Bowel sounds are normal. There is no distension. Palpations: Abdomen is soft. Tenderness: There is no abdominal tenderness. There is no guarding or rebound. Musculoskeletal:         General: No deformity. Skin:     General: Skin is warm and dry. Neurological:      Mental Status: He is alert and oriented to person, place, and time. Psychiatric:         Mood and Affect: Mood normal.         Behavior: Behavior normal.         Diagnostic Results     RADIOLOGY:  XR ACUTE ABD SERIES CHEST 1 VW   Final Result      Chest:   1. Mild patchy left basilar airspace disease, atelectasis versus pneumonia. ABDOMEN:   1. Nonobstructive bowel gas pattern with a moderate stool burden. 2. 2 mm calcification projecting over the right renal shadow may indicate renal calculus. 3. 3 mm calculus in the left pelvis may represent a calculus or phlebolith.           LABS:   Results for orders placed or performed during the hospital encounter of 10/18/20   CBC Auto Differential   Result Value Ref Range    WBC 8.9 4.0 - 11.0 K/uL    RBC 3.57 (L) 4.20 - 5.90 M/uL    Hemoglobin 11.4 (L) 13.5 - 17.5 g/dL    Hematocrit 33.4 (L) 40.5 - 52.5 %    MCV 93.7 80.0 - 100.0 fL    MCH 31.9 26.0 - 34.0 pg    MCHC 34.1 31.0 - 36.0 g/dL    RDW 13.4 12.4 - 15.4 %    Platelets 457 273 - 751 K/uL    MPV 7.5 5.0 - 10.5 fL    Neutrophils % 73.4 %    Lymphocytes % 13.8 %    Monocytes % 11.4 %    Eosinophils % 1.1 %    Basophils % 0.3 %    Neutrophils Absolute 6.5 1.7 - 7.7 K/uL    Lymphocytes Absolute 1.2 1.0 - 5.1 K/uL    Monocytes Absolute 1.0 0.0 - 1.3 K/uL    Eosinophils Absolute 0.1 0.0 - 0.6 K/uL    Basophils Absolute 0.0 0.0 - 0.2 K/uL   Lipase   Result Value Ref Range    Lipase 12.0 (L) 13.0 - 60.0 U/L   Comprehensive Metabolic Panel w/ Reflex to MG Result Value Ref Range    Sodium 134 (L) 136 - 145 mmol/L    Potassium reflex Magnesium 4.0 3.5 - 5.1 mmol/L    Chloride 99 99 - 110 mmol/L    CO2 25 21 - 32 mmol/L    Anion Gap 10 3 - 16    Glucose 108 (H) 70 - 99 mg/dL    BUN 13 7 - 20 mg/dL    CREATININE 0.8 0.8 - 1.3 mg/dL    GFR Non-African American >60 >60    GFR African American >60 >60    Calcium 8.7 8.3 - 10.6 mg/dL    Total Protein 6.4 6.4 - 8.2 g/dL    Alb 3.6 3.4 - 5.0 g/dL    Albumin/Globulin Ratio 1.3 1.1 - 2.2    Total Bilirubin 0.5 0.0 - 1.0 mg/dL    Alkaline Phosphatase 50 40 - 129 U/L    ALT 13 10 - 40 U/L    AST 18 15 - 37 U/L    Globulin 2.8 g/dL       RECENT VITALS:  BP: (!) 153/73, Temp: 98.3 °F (36.8 °C), Pulse: 87,Resp: 17, SpO2: 95 %     Procedures         ED Course     Nursing Notes, Past Medical Hx, Past Surgical Hx, Social Hx, Allergies, and Family Hx were reviewed. The patient was given the followingmedications:  Orders Placed This Encounter   Medications    0.9 % sodium chloride bolus    ondansetron (ZOFRAN) injection 4 mg    polyethylene glycol (MIRALAX) packet 17 g    docusate sodium (COLACE) capsule 100 mg       CONSULTS:  None    MEDICAL DECISION MAKING / ASSESSMENT / Cam Arthur is a 68 y.o. male with PMH of HTN, HLD, Diverticulosis who presents with Constipation. Patient had left TKR 10/14 by Dr. Stephan Rosado. He reports that he has not had a BM since prior to the surgery. He complains of abdominal distension/bloating, nausea, acid reflux. He has been taking 2 percocet every 4 hours since his discharge and has not been taking any prescribed bowel regime. He denies any vomiting, fever, abdominal pain, urinary complaints. Physical exam reveals well appearing 68year old male in no acute distress. There are +bowel sounds. Abdomen is softly distended, no tenderness or peritoneal signs. Abd FAS revealed non-obstructive bowel gas pattern with moderate stool burden.  It did not reveal evidence of bowel obstruction or volvulus. IV access was established. 1L bolus of normal saline, zofran, miralax and colace given. Labs including CBC, CMP, Lipase were obtained. Labs revealed no acute findings. Patient's symptoms are consistent with post surgical narcotic induced constipation. No evidence of obstruction at this time-pt is passing gas. He was instructed that he may take pain meds PRN and whilst taking narcotics, he should be on a bowel regime. He was started on miralax and colace here and will be prescribed these medications upon discharge. He is given return precautions. This patient was also evaluated by the attending physician. All care plans were discussed and agreed upon. Clinical Impression     1. Drug-induced constipation        Disposition     PATIENT REFERRED TO:  [facility]  [address] [address]  [phone]    If symptoms worsen      DISCHARGE MEDICATIONS:  Current Discharge Medication List           DISPOSITION  Discharge.        Russell Ferguson PA-C  10/18/20 [facility], [place]  10/18/20 1033

## 2020-10-18 NOTE — ED PROVIDER NOTES
ED Attending Attestation Note     Date of evaluation: 10/18/2020    This patient was seen by the advance practice provider. I have seen and examined the patient, agree with the workup, evaluation, management and diagnosis. The care plan has been discussed. My assessment reveals a well-appearing older gentleman, pleasantly conversational, in no acute distress. He presents with increasing abdominal distention, with some nausea but no vomiting, and constipation since just before his total knee replacement surgery 4 days ago. He has been taking narcotic pain medications every 4 hours as prescribed, but did not fill and has not been taking the stool softeners that were prescribed to him at the same time. His abdomen is soft and nontender on my examination. His acute abdominal series shows a significant stool burden, with no evidence of volvulus, which was the concern of the referring orthopedic nurse practitioner, or of bowel obstruction.        Rajiv Tanner MD  10/18/20 1110

## 2020-10-18 NOTE — ED TRIAGE NOTES
Pt states his pain meds are making him nauseated and has not had a BM but did pass gas surgery was Wed

## 2020-10-19 ENCOUNTER — CARE COORDINATION (OUTPATIENT)
Dept: CARE COORDINATION | Age: 80
End: 2020-10-19

## 2020-10-20 ENCOUNTER — HOSPITAL ENCOUNTER (OUTPATIENT)
Dept: PHYSICAL THERAPY | Age: 80
Setting detail: THERAPIES SERIES
Discharge: HOME | End: 2020-10-20
Payer: MEDICARE

## 2020-10-20 PROCEDURE — 97110 THERAPEUTIC EXERCISES: CPT | Performed by: PHYSICAL THERAPY ASSISTANT

## 2020-10-20 PROCEDURE — 97140 MANUAL THERAPY 1/> REGIONS: CPT | Performed by: PHYSICAL THERAPY ASSISTANT

## 2020-10-20 PROCEDURE — 97530 THERAPEUTIC ACTIVITIES: CPT | Performed by: PHYSICAL THERAPY ASSISTANT

## 2020-10-20 PROCEDURE — 97016 VASOPNEUMATIC DEVICE THERAPY: CPT | Performed by: PHYSICAL THERAPY ASSISTANT

## 2020-10-20 NOTE — FLOWSHEET NOTE
discussion with Salomón on Sunday due to nausea/constipation. Given meds for constipation and IV fluids. Reports normal urinary and bowel movements now. Sunday evening pt states his wife fell (she is currently in hospital for Tx/evaluation for concussion) and required assistance so he quickly walked upstairs without walker. Now concerned that he hurt knee while trying to help wife. No longer taking pain meds. OBJECTIVE:      10/20/20  ROM PROM AROM Overpressure Comment     L R L R L R     Flexion     70 140         Extension     -10 0                                                 10/16/20  Strength L R Comment   Quad 2 5  FLORENCIA 0°   Hamstring       Gastroc       Hip  flexion       Hip abd                                10/20/20  Special Test Results/Comment   Homans Negative   Temperature Negative         10/20/20  Girth L R   Mid Patella 44.9 40   Suprapatellar 45.6 40.8   5cm above NT - clothing NT - clothing   15cm above NT - clothing NT - clothing    **Girth updates deferred to NPV    Reflexes/Sensation:               []?Dermatomes/Myotomes intact               []?Reflexes equal and normal bilaterally              [x]? Other: NT     Joint mobility:                []?Normal                       [x]? Hypo: decreased patellar mobility              []?Hyper     Palpation: Moderate generalized due to PO pain and swelling     Functional Mobility/Transfers: Requires moderate assist of 1 for L LE transfer from floor to table     Posture: PO neutral varus (partial MCL release during TKR)     Bandages/Dressings/Incisions: PO bandage and dressing change;  Aquacell removed and steri strips replaced, incisions are clean/dry 10-20-20; moderate ecchymosis,      Gait: Rolling walker with ~25% PWB; antalgic with limited stance L and limited swing flexion     Orthopedic Special Tests:  Valgus stress test (-) MCL is stable 10-20-20    10-16-20  BP: 128/62 mmHg  Pulse O2: 90-92%  HR: 48-52  @~12:10pm patient sitting upright on Manual Interventions          Patient have access to gym? [] Yes  [] No  Patient have equipment at home? [] Yes  [] No     Patient Education:  10/13/20: Educated patient on all pre-op and post-op instructions including but not limited to R.I.C.E., post-op HEP, DVT prevention, warning signs of infection and DVT, and on activity limitations. 10/16/20: Patient education regarding signs/ symptoms for MD phone call vs ER/ instructions for HEP/ instructions for DJO ICEMAN    HEP:  Patient instructed on HEP on this date with handout provided and all questions answered. Patient was instructed to contact PT with any complications or questions about HEP moving forward. Patient verbally stated she/he understood. Updated 10/13/20  [employer] CODE: [id number]; see attached for 10/16/20 update on paper    Therapeutic Exercise and NMR EXR  [x] (17710) Provided verbal/tactile cueing for activities related to strengthening, flexibility, endurance, ROM for improvements in LE, proximal hip, and core control with self care, mobility, lifting, ambulation. [x] (30406) Provided verbal/tactile cueing for activities related to improving balance, coordination, kinesthetic sense, posture, motor skill, proprioception  to assist with LE, proximal hip, and core control in self care, mobility, lifting, ambulation and eccentric single leg control.      NMR and Therapeutic Activities:    [x] (17740 or 04338) Provided verbal/tactile cueing for activities related to improving balance, coordination, kinesthetic sense, posture, motor skill, proprioception and motor activation to allow for proper function of core, proximal hip and LE with self care and ADLs  [x] (57063) Gait Re-education- Provided training and instruction to the patient for proper LE, core and proximal hip recruitment and positioning and eccentric body weight control with ambulation re-education including up and down stairs     Home Exercise Program:    [x] (52203) Reviewed/Progressed HEP activities related to strengthening, flexibility, endurance, ROM of core, proximal hip and LE for functional self-care, mobility, lifting and ambulation/stair navigation   [x] (33288)Reviewed/Progressed HEP activities related to improving balance, coordination, kinesthetic sense, posture, motor skill, proprioception of core, proximal hip and LE for self care, mobility, lifting, and ambulation/stair navigation      Manual Treatments:  PROM / STM / Oscillations-Mobs:  G-I, II, III, IV (PA's, Inf., Post.)  [x] (02269) Provided manual therapy to mobilize LE, proximal hip and/or LS spine soft tissue/joints for the purpose of modulating pain, promoting relaxation,  increasing ROM, reducing/eliminating soft tissue swelling/inflammation/restriction, improving soft tissue extensibility and allowing for proper ROM for normal function with self care, mobility, lifting and ambulation. 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 Independent in HEP and progression per patient tolerance, in order to prevent re-injury. [] Progressing: [] Met: [] Not Met: [] Adjusted   2. Patient will have a decrease in pain to facilitate improvement in movement, function, and ADLs as indicated by Functional Deficits.     [] Progressing: [] Met: [] Not Met: [] Adjusted    Long Term Goals: To be achieved in: 12 weeks  1. Disability index score of 21% or less for the LEFS to assist with reaching prior level of function. [] Progressing: [] Met: [] Not Met: [] Adjusted  2. Patient will demonstrate increased AROM to 0-125 or greater to allow for proper joint functioning as indicated by patients Functional Deficits. [] Progressing: [] Met: [] Not Met: [] Adjusted  3. Patient will demonstrate an increase in Strength to 5/5 in BLE to allow for proper functional mobility as indicated by patients Functional Deficits. [] Progressing: [] Met: [] Not Met: [] Adjusted  4. Patient will return to ADL and other functional activities without increased symptoms or restriction. [] Progressing: [] Met: [] Not Met: [] Adjusted  5. Patient will be able to ascend/descend 10 stairs without pain in left knee. (patient specific functional goal)    [] Progressing: [] Met: [] Not Met: [] Adjusted    Overall Progression Towards Functional goals/ Treatment Progress Update:  [] Patient is progressing as expected towards functional goals listed. [] Progression is slowed due to complexities/Impairments listed. [] Progression has been slowed due to co-morbidities. [x] Plan just implemented, too soon to assess goals progression <30days   [] Goals require adjustment due to lack of progress  [] Patient is not progressing as expected and requires additional follow up with physician  [] Other    Prognosis for POC: [x] Good [] Fair  [] Poor    Patient requires continued skilled intervention: [x] Yes  [] No    Treatment/Activity Tolerance:  [x] Patient able to complete treatment  [x] Patient limited by fatigue  [] Patient limited by pain     [] Patient limited by other medical complications  [x] Other: Pt presents with questions/concerns regarding status of knee. No ill effects noted from incident on Sunday. Pt tolerated program fairly well. Updated HEP/reviewed RICE to control post op symptoms.  Pt very receptive to instructions. PLAN: See eval  [] Continue per plan of care [] Alter current plan (see comments above)  [x] Plan of care initiated [] Hold pending MD visit [] Discharge      Electronically signed by:  Alex Alvarez PTA    Note: If patient does not return for scheduled/ recommended follow up visits, this note will serve as a discharge from care along with most recent update on progress.

## 2020-10-21 ENCOUNTER — OFFICE VISIT (OUTPATIENT)
Dept: ORTHOPEDIC SURGERY | Age: 80
End: 2020-10-21

## 2020-10-21 VITALS — BODY MASS INDEX: 26.28 KG/M2 | WEIGHT: 194 LBS | HEIGHT: 72 IN

## 2020-10-21 PROCEDURE — 99024 POSTOP FOLLOW-UP VISIT: CPT | Performed by: STUDENT IN AN ORGANIZED HEALTH CARE EDUCATION/TRAINING PROGRAM

## 2020-10-21 RX ORDER — METHYLPREDNISOLONE 4 MG/1
TABLET ORAL
Qty: 1 KIT | Refills: 0 | Status: SHIPPED | OUTPATIENT
Start: 2020-10-21 | End: 2020-10-27

## 2020-10-21 NOTE — PROGRESS NOTES
[facility]  Office Visit  Follow up  Date:  10/21/2020    Name:  Cipriano Wagner  Address:  [id number]  [place] [facility] [address]    :  1940      Age:   68 y.o.    SSN:  [id number]      Medical Record Number:  <[id number]>    Chief Complaint:    Follow up for postoperative visit left knee    HPI:   Cipriano Wagner is a 68 y.o. male who is following up for postoperative visit of his left knee. He was concerned about swelling and ecchymosis in his leg and wanted to be evaluated. History of total knee arthroplasty by Dr. Luis Armando Mock on 10/14. He was seen in physical therapy here in the [facility] office yesterday and Dr. Luis Armando Mock saw him as well. Over the weekend wife had a syncopal episode and in trying to catch her he stumbled on the stairs but not not have a fall. He was concerned that he may have injured his knee. He has been utilizing cryotherapy. He is ambulating with a walker. He is utilizing the URI hose as well as the Ace wrap. He denies any new numbness, tingling, fevers, chills, chest pain, shortness of breath, or any other new significant symptoms.     Pain Assessment  Location of Pain: Knee  Location Modifiers: Left  Severity of Pain: 10  Quality of Pain: Aching, Throbbing, Dull  Duration of Pain: Persistent  Frequency of Pain: Intermittent  Aggravating Factors: Walking  Relieving Factors: Rest  Result of Injury: No  Work-Related Injury: No  Are there other pain locations you wish to document?: No    Past History:  Past Medical History:   Diagnosis Date    Hypertension     Prolonged emergence from general anesthesia        Past Surgical History:   Procedure Laterality Date    COLONOSCOPY      COLONOSCOPY  10/24/2013    JOINT REPLACEMENT      TOTAL KNEE ARTHROPLASTY Left 10/14/2020    LEFT TOTAL KNEE REPLACEMENT performed by Anam Frideman MD at [address]  History     Tobacco Use    Smoking status: Never Smoker    Smokeless tobacco: Never Used   Substance Use Topics    Alcohol use: Yes     Alcohol/week: 3.0 standard drinks     Types: 3 Glasses of wine per week    Drug use: Never        Family History:  family history is not on file. Current Outpatient Medications:     methylPREDNISolone (MEDROL DOSEPACK) 4 MG tablet, Take by mouth., Disp: 1 kit, Rfl: 0    polyethylene glycol (MIRALAX) 17 GM/SCOOP powder, Take 17 g by mouth daily for 7 days PRN constipation, Disp: 1 Bottle, Rfl: 0    docusate sodium (COLACE) 100 MG capsule, Take 1 capsule by mouth 2 times daily, Disp: 30 capsule, Rfl: 0    oxyCODONE-acetaminophen (PERCOCET) 5-325 MG per tablet, Take 1-2 tablets by mouth every 4 hours as needed for Pain for up to 7 days. Intended supply: 7 days. Take lowest dose possible to manage pain. Do not take with tylenol, Disp: 42 tablet, Rfl: 0    senna-docusate (PERICOLACE) 8.6-50 MG per tablet, Take 2 tablets by mouth daily as needed for Constipation, Disp: 30 tablet, Rfl: 0    ondansetron (ZOFRAN) 4 MG tablet, Take 1 tablet by mouth every 8 hours as needed for Nausea or Vomiting, Disp: 30 tablet, Rfl: 0    benazepril (LOTENSIN) 10 MG tablet, Take 10 mg by mouth daily. , Disp: , Rfl:     amLODIPine (NORVASC) 10 MG tablet, Take 10 mg by mouth daily. , Disp: , Rfl:       No Known Allergies      Review of Systems: The review is negative with the exception of those things mentioned in the HPI    No notes on file    Physical Exam:  Ht 6' 0.01\" (1.829 m)   Wt 194 lb 0.1 oz (88 kg)   BMI 26.31 kg/m²       General: No acute distress, well nourished  CV: No obvious peripheral edema. Normal peripheral pulses  Resp: nonlabored respiration, symmetrical chest expansion  Neuro: Alert & oriented x 3  Psych: Appropriate mood and affect    LLE: URI hose dressing in place. Ecchymosis noted along the posterior aspect of the popliteal fossa extending proximally into the mid thigh.   Swelling noted proximal to the URI hose above the knee - mid thigh.  Incision site with Steri-Strips in place. No surrounding erythema. No drainage noted. Range of motion 5 to 90 degrees. No calf tenderness or swellingAble to maintain straight leg raise. Sensation intact to light touch in the superficial peroneal, deep peroneal, tibial, sural, saphenous nerve distributions. Motor intact for the function of the superficial peroneal, deep peroneal, tibial nerves. < 2 sec cap refill in digits. Radiographic:  No new imaging    Assessment:  Dung Marcos is a 68 y.o. male status post left total knee arthroplasty 7 days ago on 10/14    Office Procedures:  No orders of the defined types were placed in this encounter. Plan:   -Weight-bear as tolerated, progressing appropriately. -Prescription for Medrol Dosepak for swelling  - continue asa BID  -continue therapy as directed, next visit is tomorrow  -Follow-up with Dr. Azam Mac as previously scheduled    Som Mccurdy MD  Orthopaedic Fellow  [facility] and [address] [place] [address]        This dictation was performed with a verbal recognition program [facility]) and it was checked for errors. It is possible that there are still dictated errors within this office note. If so, please bring any errors to my attention for an addendum. All efforts were made to ensure that this office note is accurate.

## 2020-10-22 ENCOUNTER — HOSPITAL ENCOUNTER (OUTPATIENT)
Dept: PHYSICAL THERAPY | Age: 80
Setting detail: THERAPIES SERIES
Discharge: HOME | End: 2020-10-22
Payer: MEDICARE

## 2020-10-22 PROCEDURE — 97140 MANUAL THERAPY 1/> REGIONS: CPT | Performed by: PHYSICAL THERAPY ASSISTANT

## 2020-10-22 PROCEDURE — 97530 THERAPEUTIC ACTIVITIES: CPT | Performed by: PHYSICAL THERAPY ASSISTANT

## 2020-10-22 PROCEDURE — 97110 THERAPEUTIC EXERCISES: CPT | Performed by: PHYSICAL THERAPY ASSISTANT

## 2020-10-22 PROCEDURE — 97016 VASOPNEUMATIC DEVICE THERAPY: CPT | Performed by: PHYSICAL THERAPY ASSISTANT

## 2020-10-22 NOTE — FLOWSHEET NOTE
reports taking yesterday off of strength exercises after working hard on Tuesday. He feels his body responds better to working hard and then having rest day--has been exercising for years. Will start medrol dose pack tomorrow. OBJECTIVE:      10/22/20  ROM PROM AROM Overpressure Comment     L R L R L R     Flexion     73 140         Extension     -10/-5 0                                                 10/16/20  Strength L R Comment   Quad 2 5  FLORENCIA 0°   Hamstring       Gastroc       Hip  flexion       Hip abd                                10/22/20  Special Test Results/Comment   Homans Negative   Temperature Negative         10/20/20  Girth L R   Mid Patella 44.9 40   Suprapatellar 45.6 40.8   5cm above NT - clothing NT - clothing   15cm above NT - clothing NT - clothing       Reflexes/Sensation:               []?Dermatomes/Myotomes intact               []?Reflexes equal and normal bilaterally              [x]? Other: NT     Joint mobility:                []?Normal                       [x]? Hypo: decreased patellar mobility              []?Hyper     Palpation: Moderate generalized due to PO pain and swelling     Functional Mobility/Transfers: Requires moderate assist of 1 for L LE transfer from floor to table     Posture: PO neutral varus (partial MCL release during TKR)     Bandages/Dressings/Incisions: PO bandage and dressing change;  Aquacell removed and steri strips replaced, incisions are clean/dry 10-20-20; moderate ecchymosis,      Gait: Rolling walker with ~25-50% PWB; antalgic with limited stance L and limited swing flexion     Orthopedic Special Tests:  Valgus stress test (-) MCL is stable 10-20-20    10-16-20  BP: 128/62 mmHg  Pulse O2: 90-92%  HR: 48-52  @~12:10pm patient sitting upright on plinth in no apparent distress; requested a cup of water; when returned with water, patient developed a blank stare and was initially unresponsive to verbal and tactile cues; patient's eyes then closed and Yes  [] No     Patient Education:  10/13/20: Educated patient on all pre-op and post-op instructions including but not limited to R.I.C.E., post-op HEP, DVT prevention, warning signs of infection and DVT, and on activity limitations. 10/16/20: Patient education regarding signs/ symptoms for MD phone call vs ER/ instructions for HEP/ instructions for DJO ICEMAN    HEP:  Patient instructed on HEP on this date with handout provided and all questions answered. Patient was instructed to contact PT with any complications or questions about HEP moving forward. Patient verbally stated she/he understood. Updated 10/13/20  [employer] CODE: [id number]; see attached for 10/16/20 update on paper    Therapeutic Exercise and NMR EXR  [x] (52402) Provided verbal/tactile cueing for activities related to strengthening, flexibility, endurance, ROM for improvements in LE, proximal hip, and core control with self care, mobility, lifting, ambulation. [x] (56749) Provided verbal/tactile cueing for activities related to improving balance, coordination, kinesthetic sense, posture, motor skill, proprioception  to assist with LE, proximal hip, and core control in self care, mobility, lifting, ambulation and eccentric single leg control.      NMR and Therapeutic Activities:    [x] (73742 or 83149) Provided verbal/tactile cueing for activities related to improving balance, coordination, kinesthetic sense, posture, motor skill, proprioception and motor activation to allow for proper function of core, proximal hip and LE with self care and ADLs  [x] (57217) Gait Re-education- Provided training and instruction to the patient for proper LE, core and proximal hip recruitment and positioning and eccentric body weight control with ambulation re-education including up and down stairs     Home Exercise Program:    [x] (75950) Reviewed/Progressed HEP activities related to strengthening, flexibility, endurance, ROM of core, proximal hip and LE for functional self-care, mobility, lifting and ambulation/stair navigation   [x] (00717)Reviewed/Progressed HEP activities related to improving balance, coordination, kinesthetic sense, posture, motor skill, proprioception of core, proximal hip and LE for self care, mobility, lifting, and ambulation/stair navigation      Manual Treatments:  PROM / STM / Oscillations-Mobs:  G-I, II, III, IV (PA's, Inf., Post.)  [x] (83287) Provided manual therapy to mobilize LE, proximal hip and/or LS spine soft tissue/joints for the purpose of modulating pain, promoting relaxation,  increasing ROM, reducing/eliminating soft tissue swelling/inflammation/restriction, improving soft tissue extensibility and allowing for proper ROM for normal function with self care, mobility, lifting and ambulation. 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 Independent in HEP and progression per patient tolerance, in order to prevent re-injury. [] Progressing: [] Met: [] Not Met: [] Adjusted   2. Patient will have a decrease in pain to facilitate improvement in movement, function, and ADLs as indicated by Functional Deficits. [] Progressing: [] Met: [] Not Met: [] Adjusted    Long Term Goals: To be achieved in: 12 weeks  1.  Disability index score of 21% or less for the LEFS to assist with reaching prior level of function. [] Progressing: [] Met: [] Not Met: [] Adjusted  2. Patient will demonstrate increased AROM to 0-125 or greater to allow for proper joint functioning as indicated by patients Functional Deficits. [] Progressing: [] Met: [] Not Met: [] Adjusted  3. Patient will demonstrate an increase in Strength to 5/5 in BLE to allow for proper functional mobility as indicated by patients Functional Deficits. [] Progressing: [] Met: [] Not Met: [] Adjusted  4. Patient will return to ADL and other functional activities without increased symptoms or restriction. [] Progressing: [] Met: [] Not Met: [] Adjusted  5. Patient will be able to ascend/descend 10 stairs without pain in left knee. (patient specific functional goal)    [] Progressing: [] Met: [] Not Met: [] Adjusted    Overall Progression Towards Functional goals/ Treatment Progress Update:  [] Patient is progressing as expected towards functional goals listed. [] Progression is slowed due to complexities/Impairments listed. [] Progression has been slowed due to co-morbidities. [x] Plan just implemented, too soon to assess goals progression <30days   [] Goals require adjustment due to lack of progress  [] Patient is not progressing as expected and requires additional follow up with physician  [] Other    Prognosis for POC: [x] Good [] Fair  [] Poor    Patient requires continued skilled intervention: [x] Yes  [] No    Treatment/Activity Tolerance:  [x] Patient able to complete treatment  [x] Patient limited by fatigue  [] Patient limited by pain     [] Patient limited by other medical complications  [x] Other: Pt tolerated program fairly well. Moderate stiffness with KF ROM and minimal gain from last visit. Pt was able to gain 5° of knee extension ROM after stretching and QS. Will monitor ROM closely for progress.  Reviewed ROM frequency/OP program. Pt very receptive to instructions and understands ecxpectations. PLAN: See eval  [] Continue per plan of care [] Alter current plan (see comments above)  [x] Plan of care initiated [] Hold pending MD visit [] Discharge      Electronically signed by:  Indiana Lopez PTA    Note: If patient does not return for scheduled/ recommended follow up visits, this note will serve as a discharge from care along with most recent update on progress.

## 2020-10-27 ENCOUNTER — HOSPITAL ENCOUNTER (OUTPATIENT)
Dept: PHYSICAL THERAPY | Age: 80
Setting detail: THERAPIES SERIES
Discharge: HOME | End: 2020-10-27
Payer: MEDICARE

## 2020-10-27 PROCEDURE — 97110 THERAPEUTIC EXERCISES: CPT | Performed by: PHYSICAL THERAPIST

## 2020-10-27 PROCEDURE — 97530 THERAPEUTIC ACTIVITIES: CPT | Performed by: PHYSICAL THERAPIST

## 2020-10-27 PROCEDURE — 97140 MANUAL THERAPY 1/> REGIONS: CPT | Performed by: PHYSICAL THERAPIST

## 2020-10-27 PROCEDURE — 97016 VASOPNEUMATIC DEVICE THERAPY: CPT | Performed by: PHYSICAL THERAPIST

## 2020-10-27 RX ORDER — OXYCODONE HYDROCHLORIDE AND ACETAMINOPHEN 5; 325 MG/1; MG/1
1 TABLET ORAL
Qty: 28 TABLET | Refills: 0 | Status: SHIPPED | OUTPATIENT
Start: 2020-10-27 | End: 2020-11-03

## 2020-10-27 NOTE — PROGRESS NOTES
Call received that the patient was having worsening pain. Refill of Percocet sent. OARRS reviewed.     Simran Haskins DO  10/27/20 2:09 PM

## 2020-10-27 NOTE — FLOWSHEET NOTE
The [address] [place] [address] and Sports [facility]    Physical Therapy Daily Treatment Note  Date:  10/27/2020    Patient Name:  Romero Narvaez    :  1940  MRN: [id number]  Restrictions/Precautions:    Medical/Treatment Diagnosis Information:  · Diagnosis: M17.12 (ICD-10-CM) - Primary osteoarthritis of left knee  · Treatment Diagnosis: M25.562 L Knee Pain; R53.1 Weakness  · S/PLeft TKR, partial MCL release  · DOS: 10/14/20  · Meds: Percocet; ASA  Insurance/Certification information:  PT Insurance Information: PT BENEFITS / AETNA/ EFFECTIVE 20/ ACTIVE/  / PAYS 96%/ OOP 1500 . 95/ NO VISIT LIMIT MED NEC/ 0 AUTH/ [employer]ELA REF# [id number]/ BENEFIT FAX IN MEDIA/ 10-7-20 PAG  Physician Information:  Referring Practitioner: Lauri Smith  Has the plan of care been signed (Y/N):        []  Yes  [x]  No     Date of Patient follow up with Physician: 1, 3, 6, 12 weeks PO (plan for initial MD/ PT consult on Tuesday, 10/20/20)  Patient seen in consultation with Dr. Lauri Smith who established the initial/subsequent treatment protocol. 10-20-20: MCL is stable, given medrol dose pack for swelling, ok standard protocol, avoid falling to protect MCL--MD states it is slightly weaker x2-3 weeks after partial release    Is this a Progress Report:     []  Yes  [x]  No        If Yes:  Date Range for reporting period:  Beginning  Ending    Progress report will be due (10 Rx or 30 days whichever is less):        Recertification will be due (POC Duration  / 90 days whichever is less): 21         Visit # Insurance Allowable Auth Required   5 MED NEC []  Yes [x]  No      OUTCOME MEASURE DATE DEFICIT   WOMAC 10/13/20 pre-op 43% Deficit   LEFS Score 10/16/20 (Postop) 94% Deficit                  Patient given satisfaction survey?  [] Yes   [x] No       Latex Allergy:  [x]NO      []YES  Preferred Language for Healthcare:   [x]English       []other:     Pain level:  2- 6/ 10     SUBJECTIVE:   States having issues related to pain management with Percocet being no help. Also states started his Medrol Dose Pack with adverse reaction of excessive bowel movement. Concerned enough to stop taking the MDP. Presents today with concerns/ Brannon Done to have fellow call after surgery. Pt reports taking yesterday off of strength exercises after working hard on Tuesday. He feels his body responds better to working hard and then having rest day--has been exercising for years. Will start medrol dose pack tomorrow. OBJECTIVE:      10/27/20  ROM PROM AROM Overpressure Comment     L R L R L R     Flexion     80 140      ERMI   Extension     -10/-5 0                                                 10/27/20  Strength L R Comment   Quad 2 5  FLORENCIA 0°   Hamstring       Gastroc       Hip  flexion       Hip abd                                10/27/20  Special Test Results/Comment   Homans Negative   Temperature Negative         10/27/20  Girth L R   Mid Patella 44.0 40. Suprapatellar 45.2 40.5   5cm above 44.9 42.1   15cm above         Reflexes/Sensation:               []?Dermatomes/Myotomes intact               []?Reflexes equal and normal bilaterally              [x]? Other: NT     Joint mobility:                []?Normal                       [x]? Hypo: decreased patellar mobility              []?Hyper     Palpation: Moderate generalized due to PO pain and swelling     Functional Mobility/Transfers: Requires moderate assist of 1 for L LE transfer from floor to table     Posture: PO neutral varus (partial MCL release during TKR)     Bandages/Dressings/Incisions: PO bandage and dressing change;  Aquacell removed and steri strips replaced, incisions are clean/dry 10-20-20; moderate ecchymosis,      Gait: Rolling walker with ~50% PWB; antalgic with limited stance L and limited swing flexion     Orthopedic Special Tests:  Valgus stress test (-) MCL is stable 10-20-20    10-16-20  BP: 128/62 mmHg  Pulse O2: 90-92%  HR: 48-52  @~12:10pm patient sitting upright on plinth in no apparent distress; requested a cup of water; when returned with water, patient developed a blank stare and was initially unresponsive to verbal and tactile cues; patient's eyes then closed and remained unresponsive to verbal/ tactile cues; patient lowered to table/ LE raised-> patient returned to responsive to verbal cues; patient aware of time/ place/ person; described getting very warm/ weak/ clammy; vitals taken/ patient maintained in head down and legs elevated posture/ fluids and candy and crackers administered/ Dr. Epi Walton at [facility] [employer] [employer] contacted for discussion and direction; signs and symptoms discussed and instructed to monitor and if patient alert, able to return home with instructions to monitor; Makenna Crandall and Marva Schumacher with in person assistance       RESTRICTIONS/PRECAUTIONS: HTN (medicated);     Exercises/Interventions:  Exercise/Equipment Resistance/Repetitions Other comments 10/27/2020     Stretching        Hamstring 30\"x 5 Towel roll x   Hip Flexor      ITB      Figure 4      Quad      Inclined Calf      Towel Pull 30\"x 5 Towel roll x            ROM        EOB Self-Assist      Sheet Pull      Wall Slide      Manual-EOB 10x10''  x   Biodex Passive      Recumbent Bike      ERMI 10' 1' holds x   Hang Weights 10' Propped x   Ankle Pumps 5'  x            Patellar Glides        Medial  3'   x   Superior 3'   x   Inferior 3'   x            Isometrics        Quad sets 10x10''  A/S x   Add sets                SLR        Supine 3x10  x   Prone      Abduction 3x10  x   Adducton      SLR+                 Glutes        Bridges      Supine Clams      S/L Clams      Side Stepping        Monster walks                 CKC        Weight Shift      Calf raises 3' %WS; 50% x   Wall sits      Step ups        Squatting      CC TKE      SL DL                 PRE        Extension 3x 10 RANGE: 30-0; x   Flexion 3x 10 RANGE: 0-90; x   Leg Press RANGE: 80-10    Cable Column                 Balance        Tandem Stance      Tilt board        SLS       Biodex balance                 Other        Treadmill        Gait Training            Manual Interventions            Patient have access to gym? [] Yes  [] No  Patient have equipment at home? [] Yes  [] No     Patient Education:  10/13/20: Educated patient on all pre-op and post-op instructions including but not limited to R.I.C.E., post-op HEP, DVT prevention, warning signs of infection and DVT, and on activity limitations. 10/16/20: Patient education regarding signs/ symptoms for MD phone call vs ER/ instructions for HEP/ instructions for DJO ICEMAN  10/27/20: Discussion regarding ROM OP progression    HEP:  Patient instructed on HEP on this date with handout provided and all questions answered. Patient was instructed to contact PT with any complications or questions about HEP moving forward. Patient verbally stated she/he understood. Updated 10/13/20  [facility] CODE: [id number]; see attached for 10/16/20 update on paper    Therapeutic Exercise and NMR EXR  [x] (40460) Provided verbal/tactile cueing for activities related to strengthening, flexibility, endurance, ROM for improvements in LE, proximal hip, and core control with self care, mobility, lifting, ambulation. [x] (12190) Provided verbal/tactile cueing for activities related to improving balance, coordination, kinesthetic sense, posture, motor skill, proprioception  to assist with LE, proximal hip, and core control in self care, mobility, lifting, ambulation and eccentric single leg control.      NMR and Therapeutic Activities:    [x] (71878 or 42422) Provided verbal/tactile cueing for activities related to improving balance, coordination, kinesthetic sense, posture, motor skill, proprioception and motor activation to allow for proper function of core, proximal hip and LE with self care and ADLs  [x] (38936) Gait Re-education- Provided training and instruction to the patient for proper LE, core and proximal hip recruitment and positioning and eccentric body weight control with ambulation re-education including up and down stairs     Home Exercise Program:    [x] (64311) Reviewed/Progressed HEP activities related to strengthening, flexibility, endurance, ROM of core, proximal hip and LE for functional self-care, mobility, lifting and ambulation/stair navigation   [x] (54774)Reviewed/Progressed HEP activities related to improving balance, coordination, kinesthetic sense, posture, motor skill, proprioception of core, proximal hip and LE for self care, mobility, lifting, and ambulation/stair navigation      Manual Treatments:  PROM / STM / Oscillations-Mobs:  G-I, II, III, IV (PA's, Inf., Post.)  [x] (29368) Provided manual therapy to mobilize LE, proximal hip and/or LS spine soft tissue/joints for the purpose of modulating pain, promoting relaxation,  increasing ROM, reducing/eliminating soft tissue swelling/inflammation/restriction, improving soft tissue extensibility and allowing for proper ROM for normal function with self care, mobility, lifting and ambulation. 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  Independent in HEP and progression per patient tolerance, in order to prevent re-injury. [] Progressing: [] Met: [] Not Met: [] Adjusted   2. Patient will have a decrease in pain to facilitate improvement in movement, function, and ADLs as indicated by Functional Deficits. [] Progressing: [] Met: [] Not Met: [] Adjusted    Long Term Goals: To be achieved in: 12 weeks  1. Disability index score of 21% or less for the LEFS to assist with reaching prior level of function. [] Progressing: [] Met: [] Not Met: [] Adjusted  2. Patient will demonstrate increased AROM to 0-125 or greater to allow for proper joint functioning as indicated by patients Functional Deficits. [] Progressing: [] Met: [] Not Met: [] Adjusted  3. Patient will demonstrate an increase in Strength to 5/5 in BLE to allow for proper functional mobility as indicated by patients Functional Deficits. [] Progressing: [] Met: [] Not Met: [] Adjusted  4. Patient will return to ADL and other functional activities without increased symptoms or restriction. [] Progressing: [] Met: [] Not Met: [] Adjusted  5. Patient will be able to ascend/descend 10 stairs without pain in left knee. (patient specific functional goal)    [] Progressing: [] Met: [] Not Met: [] Adjusted    Overall Progression Towards Functional goals/ Treatment Progress Update:  [] Patient is progressing as expected towards functional goals listed. [] Progression is slowed due to complexities/Impairments listed. [] Progression has been slowed due to co-morbidities.   [x] Plan just implemented, too soon to assess goals progression <30days   [] Goals require adjustment due to lack of progress  [] Patient is not progressing as expected and requires additional follow up with physician  [] Other    Prognosis for POC: [x] Good [] Fair  [] Poor    Patient requires continued skilled intervention: [x] Yes  [] No    Treatment/Activity Tolerance:  [x] Patient able to complete treatment  [x] Patient limited by fatigue  [] Patient limited by pain     [] Patient limited by other medical complications  [x] Other: Pt tolerated program fairly well. Moderate stiffness with KF ROM and minimal gain from last visit. Pt was able to gain 5° of knee extension ROM after stretching and QS. Will monitor ROM closely for progress. Reviewed ROM frequency/OP program. Pt very receptive to instructions and understands ecxpectations. PLAN:   [] Continue per plan of care [] Alter current plan (see comments above)  [x] Plan of care initiated [] Hold pending MD visit [] Discharge  Frequency/Duration:  2 days per week for 12 Weeks:  Interventions:    Electronically signed by:  Ellen Wahl PT    Note: If patient does not return for scheduled/ recommended follow up visits, this note will serve as a discharge from care along with most recent update on progress.

## 2020-10-29 ENCOUNTER — HOSPITAL ENCOUNTER (OUTPATIENT)
Dept: PHYSICAL THERAPY | Age: 80
Setting detail: THERAPIES SERIES
Discharge: HOME | End: 2020-10-29
Payer: MEDICARE

## 2020-10-29 PROCEDURE — 97530 THERAPEUTIC ACTIVITIES: CPT | Performed by: PHYSICAL THERAPIST

## 2020-10-29 PROCEDURE — 97110 THERAPEUTIC EXERCISES: CPT | Performed by: PHYSICAL THERAPIST

## 2020-10-29 PROCEDURE — 97016 VASOPNEUMATIC DEVICE THERAPY: CPT | Performed by: PHYSICAL THERAPIST

## 2020-10-29 PROCEDURE — 97140 MANUAL THERAPY 1/> REGIONS: CPT | Performed by: PHYSICAL THERAPIST

## 2020-10-29 NOTE — FLOWSHEET NOTE
The [address] [place] [address] and Sports [facility]    Physical Therapy Daily Treatment Note  Date:  10/29/2020    Patient Name:  Ching Xie    :  1940  MRN: [id number]  Restrictions/Precautions:    Medical/Treatment Diagnosis Information:  · Diagnosis: M17.12 (ICD-10-CM) - Primary osteoarthritis of left knee  · Treatment Diagnosis: M25.562 L Knee Pain; R53.1 Weakness  · S/PLeft TKR, partial MCL release  · DOS: 10/14/20  · Meds: Percocet; ASA  Insurance/Certification information:  PT Insurance Information: PT BENEFITS / AET[employer]/ EFFECTIVE 20/ ACTIVE/  / PAYS 96%/ OOP 1500 . 95/ NO VISIT LIMIT MED NEC/ 0 AUTH/ [employer] REF# [id number]/ BENEFIT FAX IN MEDIA/ 10-7-20 PAG  Physician Information:  Referring Practitioner: Roosevelt Mitchell  Has the plan of care been signed (Y/N):        []  Yes  [x]  No     Date of Patient follow up with Physician: 1, 3, 6, 12 weeks PO (plan for initial MD/ PT consult on Tuesday, 10/20/20)  Patient seen in consultation with Dr. Roosevelt Mitchell who established the initial/subsequent treatment protocol. 10-20-20: MCL is stable, given medrol dose pack for swelling, ok standard protocol, avoid falling to protect MCL--MD states it is slightly weaker x2-3 weeks after partial release    Is this a Progress Report:     []  Yes  [x]  No        If Yes:  Date Range for reporting period:  Beginning  Ending    Progress report will be due (10 Rx or 30 days whichever is less):        Recertification will be due (POC Duration  / 90 days whichever is less): 21         Visit # Insurance Allowable Auth Required   6 MED NEC []  Yes [x]  No      OUTCOME MEASURE DATE DEFICIT   WOMAC 10/13/20 pre-op 43% Deficit   LEFS Score 10/16/20 (Postop) 94% Deficit                  Patient given satisfaction survey?  [] Yes   [x] No       Latex Allergy:  [x]NO      []YES  Preferred Language for Healthcare:   [x]English       []other:     Pain level:  2- 6/ 90-92%  HR: 48-52  @~12:10pm patient sitting upright on plinth in no apparent distress; requested a cup of water; when returned with water, patient developed a blank stare and was initially unresponsive to verbal and tactile cues; patient's eyes then closed and remained unresponsive to verbal/ tactile cues; patient lowered to table/ LE raised-> patient returned to responsive to verbal cues; patient aware of time/ place/ person; described getting very warm/ weak/ clammy; vitals taken/ patient maintained in head down and legs elevated posture/ fluids and candy and crackers administered/ Dr. Phuong Martinez at [facility] [employer] contacted for discussion and direction; signs and symptoms discussed and instructed to monitor and if patient alert, able to return home with instructions to monitor; Margarita Butcher and Stephanie Hathaway with in person assistance       RESTRICTIONS/PRECAUTIONS: HTN (medicated);     Exercises/Interventions:  Exercise/Equipment Resistance/Repetitions Other comments 10/29/2020     Stretching        Hamstring 30\"x 5 Towel roll; 10# x   Hip Flexor      ITB      Figure 4      Quad      Inclined Calf      Towel Pull 30\"x 5 Towel roll; 10# x            ROM        EOB Self-Assist      Sheet Pull      Wall Slide      Manual-EOB 10x10''     Biodex Passive      Recumbent Bike 8'  NPV   ERMI 10' 1' holds x   Hang Weights 10' 10# x   Ankle Pumps 5'  x                  Patellar Glides        Medial  3'   xx   Superior 3'   xx   Inferior 3'   xx            Isometrics        Quad sets 10x10''  A/S; 10# xx   Add sets 10\"x 10 PS: extended xx            SLR        Supine 3x10  x   Prone      Abduction 3x10  x   Adducton      SLR+                 Glutes        Bridges      Supine Clams      S/L Clams      Side Stepping        Monster walks                 CKC        Weight Shift      Calf raises 3' %WS; 50% x   Wall sits      Step ups        Squatting      CC TKE      SL DL                 PRE        Extension 3x 10 RANGE: 30-0; 0# x   Flexion 3x 10 RANGE: 0-90; 0# x   Leg Press   RANGE: 80-10    Cable Column                 Balance        Tandem Stance      Tilt board        SLS       Biodex balance  4' PS; L8: fill in NPV            Other        Treadmill        Gait Training            Manual Interventions            Patient have access to gym? [] Yes  [] No  Patient have equipment at home? [] Yes  [] No     Patient Education:  10/13/20: Educated patient on all pre-op and post-op instructions including but not limited to R.I.C.E., post-op HEP, DVT prevention, warning signs of infection and DVT, and on activity limitations. 10/16/20: Patient education regarding signs/ symptoms for MD phone call vs ER/ instructions for HEP/ instructions for DJO ICEMAN  10/27/20: Discussion regarding ROM OP progression    HEP:  Patient instructed on HEP on this date with handout provided and all questions answered. Patient was instructed to contact PT with any complications or questions about HEP moving forward. Patient verbally stated she/he understood. Updated 10/13/20  [facility] CODE: [id number]; see attached for 10/16/20 update on paper    Therapeutic Exercise and NMR EXR  [x] (28342) Provided verbal/tactile cueing for activities related to strengthening, flexibility, endurance, ROM for improvements in LE, proximal hip, and core control with self care, mobility, lifting, ambulation. [x] (50990) Provided verbal/tactile cueing for activities related to improving balance, coordination, kinesthetic sense, posture, motor skill, proprioception  to assist with LE, proximal hip, and core control in self care, mobility, lifting, ambulation and eccentric single leg control.      NMR and Therapeutic Activities:    [x] (51913 or 61986) Provided verbal/tactile cueing for activities related to improving balance, coordination, kinesthetic sense, posture, motor skill, proprioception and motor activation to allow for proper function of core, proximal hip and LE with self care and ADLs  [x] (21751) Gait Re-education- Provided training and instruction to the patient for proper LE, core and proximal hip recruitment and positioning and eccentric body weight control with ambulation re-education including up and down stairs     Home Exercise Program:    [x] (66626) Reviewed/Progressed HEP activities related to strengthening, flexibility, endurance, ROM of core, proximal hip and LE for functional self-care, mobility, lifting and ambulation/stair navigation   [x] (87058)Reviewed/Progressed HEP activities related to improving balance, coordination, kinesthetic sense, posture, motor skill, proprioception of core, proximal hip and LE for self care, mobility, lifting, and ambulation/stair navigation      Manual Treatments:  PROM / STM / Oscillations-Mobs:  G-I, II, III, IV (PA's, Inf., Post.)  [x] (47096) Provided manual therapy to mobilize LE, proximal hip and/or LS spine soft tissue/joints for the purpose of modulating pain, promoting relaxation,  increasing ROM, reducing/eliminating soft tissue swelling/inflammation/restriction, improving soft tissue extensibility and allowing for proper ROM for normal function with self care, mobility, lifting and ambulation.      Modalities:    [] hot packs  [] EMS    [] Ultrasound  [] ice CP  [x] vasopneumatic[de-identified] L2  [] high volt/EGS  [] phono  [] tens     [] ionto  [] autorange/biodex [] Interferential   [] other      Charges:  Timed Code Treatment Minutes: 60'   Total Treatment Minutes: 76'       [] EVAL (LOW) 455 1011 (typically 20 minutes face-to-face)  [] EVAL (MOD) 23347 (typically 30 minutes face-to-face)  [] EVAL (HIGH) 08802 (typically 45 minutes face-to-face)  [] RE-EVAL   [x] KX(96921) x   2   [] IONTO  [] NMR (12457) x      [x] VASO  [x] Manual (94766) x 1     [] Other:  [x] TA x  1     [] Mech Traction (66490)  [] ES(attended) (39302)      [] ES (un) (19303):     GOALS:   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 Independent in HEP and progression per patient tolerance, in order to prevent re-injury. [] Progressing: [] Met: [] Not Met: [] Adjusted   2. Patient will have a decrease in pain to facilitate improvement in movement, function, and ADLs as indicated by Functional Deficits. [] Progressing: [] Met: [] Not Met: [] Adjusted    Long Term Goals: To be achieved in: 12 weeks  1. Disability index score of 21% or less for the LEFS to assist with reaching prior level of function. [] Progressing: [] Met: [] Not Met: [] Adjusted  2. Patient will demonstrate increased AROM to 0-125 or greater to allow for proper joint functioning as indicated by patients Functional Deficits. [] Progressing: [] Met: [] Not Met: [] Adjusted  3. Patient will demonstrate an increase in Strength to 5/5 in BLE to allow for proper functional mobility as indicated by patients Functional Deficits. [] Progressing: [] Met: [] Not Met: [] Adjusted  4. Patient will return to ADL and other functional activities without increased symptoms or restriction. [] Progressing: [] Met: [] Not Met: [] Adjusted  5. Patient will be able to ascend/descend 10 stairs without pain in left knee. (patient specific functional goal)    [] Progressing: [] Met: [] Not Met: [] Adjusted    Overall Progression Towards Functional goals/ Treatment Progress Update:  [] Patient is progressing as expected towards functional goals listed. [] Progression is slowed due to complexities/Impairments listed. [] Progression has been slowed due to co-morbidities.   [x] Plan just implemented, too soon to assess goals progression <30days   [] Goals require adjustment due to lack of progress  [] Patient is not progressing as expected and requires additional follow up with physician  [] Other    Prognosis for POC: [x] Good [] Fair  [] Poor    Patient

## 2020-11-03 ENCOUNTER — HOSPITAL ENCOUNTER (OUTPATIENT)
Dept: PHYSICAL THERAPY | Age: 80
Setting detail: THERAPIES SERIES
Discharge: HOME | End: 2020-11-03
Payer: MEDICARE

## 2020-11-03 PROCEDURE — 97110 THERAPEUTIC EXERCISES: CPT | Performed by: PHYSICAL THERAPIST

## 2020-11-03 PROCEDURE — 97016 VASOPNEUMATIC DEVICE THERAPY: CPT | Performed by: PHYSICAL THERAPIST

## 2020-11-03 PROCEDURE — 97530 THERAPEUTIC ACTIVITIES: CPT | Performed by: PHYSICAL THERAPIST

## 2020-11-03 PROCEDURE — 97140 MANUAL THERAPY 1/> REGIONS: CPT | Performed by: PHYSICAL THERAPIST

## 2020-11-03 NOTE — FLOWSHEET NOTE
The [address] [place] [address] [facility]    Physical Therapy Daily Treatment Note  Date:  11/3/2020    Patient Name:  Army Woo    :  1940  MRN: [id number]  Restrictions/Precautions:    Medical/Treatment Diagnosis Information:  · Diagnosis: M17.12 (ICD-10-CM) - Primary osteoarthritis of left knee  · Treatment Diagnosis: M25.562 L Knee Pain; R53.1 Weakness  · S/PLeft TKR, partial MCL release  · DOS: 10/14/20  · Meds: Percocet; ASA  Insurance/Certification information:  PT Insurance Information: PT BENEFITS / AET[employer]/ EFFECTIVE 20/ ACTIVE/  / PAYS 96%/ OOP 1500 . 95/ NO VISIT LIMIT MED NEC/ 0 AUTH/ GRABIEL VELA REF# [id number]/ BENEFIT FAX IN MEDIA/ 10-7-20 PAG  Physician Information:  Referring Practitioner: Emerson Tabares  Has the plan of care been signed (Y/N):        []  Yes  [x]  No     Date of Patient follow up with Physician: 3, 6, 12 weeks PO (plan for initial MD/ PT consult on Tuesday, 10/20/20)  Patient seen in consultation with Dr. Emerson Tabares who established the initial/subsequent treatment protocol. 10-20-20: MCL is stable, given medrol dose pack for swelling, ok standard protocol, avoid falling to protect MCL--MD states it is slightly weaker x2-3 weeks after partial release    Is this a Progress Report:     []  Yes  [x]  No        If Yes:  Date Range for reporting period:  Beginning  Ending    Progress report will be due (10 Rx or 30 days whichever is less):        Recertification will be due (POC Duration  / 90 days whichever is less): 21         Visit # Insurance Allowable Auth Required   7 MED NEC []  Yes [x]  No      OUTCOME MEASURE DATE DEFICIT   WOMAC 10/13/20 pre-op 43% Deficit   LEFS Score 10/16/20 (Postop) 94% Deficit                  Patient given satisfaction survey?  [] Yes   [x] No       Latex Allergy:  [x]NO      []YES  Preferred Language for Healthcare:   [x]English       []other:     Pain level:  2- 6/ 10     SUBJECTIVE:   Still notes anterior and lateral knee tightness. Continues URI hose for swelling control. Steri strips intact. Significant pain issues with little response to Percocet and poor systemic response to the Medrol Dose Pack. Discussion with MD staff which prescribed Motrin 800 mg (using BID); providing very good support. Feels better after PT/ good night's sleep with progressive exercise. .     OBJECTIVE:      11/3/20  ROM PROM AROM Overpressure Comment     L R L R L R     Flexion  100 140        ERMI   Extension  -8 0   -5       Propped  QS                                           10/29/20  Strength L R Comment   Quad 2+ 5  FLORENCIA 0°   Hamstring       Gastroc       Hip  flexion       Hip abd                                11/3/20  Special Test Results/Comment   Homans Negative   Temperature Negative         10/27/20  Girth L R   Mid Patella 44.0 40. Suprapatellar 45.2 40.5   5cm above 44.9 42.1   15cm above         Reflexes/Sensation:               []?Dermatomes/Myotomes intact               []?Reflexes equal and normal bilaterally              [x]? Other: NT     Joint mobility:                []?Normal                       [x]? Hypo: decreased patellar mobility              []?Hyper     Palpation: Moderate generalized due to PO pain and swelling     Functional Mobility/Transfers: Requires minimal assist of 1 for L LE transfer from floor to table     Posture: PO neutral varus (partial MCL release during TKR)     Bandages/Dressings/Incisions: Steri strips intact, incisions are clean/dry; moderate ecchymosis,      Gait: Rolling walker with ~75% PWB; antalgic with limited stance L and limited swing flexion     Orthopedic Special Tests:  Valgus stress test (-) MCL is stable 10-20-20       RESTRICTIONS/PRECAUTIONS: HTN (medicated);     Exercises/Interventions:  Exercise/Equipment Resistance/Repetitions Other comments 11/3/2020     Stretching        Hamstring 30\"x 5 Towel roll; 10# x   Hip Flexor ITB      Figure 4      Quad      Inclined Calf      Towel Pull 30\"x 5 Towel roll; 10# x            ROM        EOB Self-Assist      Sheet Pull      Wall Slide      Manual-EOB 10x10''     Biodex Passive      Recumbent Bike 8'  NPV   ERMI 10' 1' holds x   Hang Weights 10' 10# x   Ankle Pumps 5'  x                  Patellar Glides        Medial  3'   x   Superior 3'   x   Inferior 3'   x            Isometrics        Quad sets 10x10''  A/S; 10# x   Add sets 10\"x 10 PS: extended x            SLR        Supine 3x10  x   Prone      Abduction 3x10  x   Adducton 2x 10\"x 10 PS NPV   SLR+                 Glutes        Bridges      Supine Clams      S/L Clams      Side Stepping        Monster walks                 CKC        Weight Shift      Calf raises 3' %WS; 50% x   Wall sits      Step ups        Squatting      CC TKE      SL DL                 PRE        Extension 3x 10 RANGE: 30-0; 0# x   Flexion 3x 10 RANGE: 0-90; 0# x   Leg Press   RANGE: 80-10    Cable Column                 Balance        Tandem Stance      Tilt board        SLS       Biodex balance  4' PS; L8: fill in x            Other        Treadmill        Gait Training            Manual Interventions            Patient have access to gym? [] Yes  [] No  Patient have equipment at home? [] Yes  [] No     Patient Education:  10/13/20: Educated patient on all pre-op and post-op instructions including but not limited to R.I.C.E., post-op HEP, DVT prevention, warning signs of infection and DVT, and on activity limitations. 10/16/20: Patient education regarding signs/ symptoms for MD phone call vs ER/ instructions for HEP/ instructions for DJO ICEMAN  10/27/20: Discussion regarding ROM OP progression    HEP:  Patient instructed on HEP on this date with handout provided and all questions answered. Patient was instructed to contact PT with any complications or questions about HEP moving forward. Patient verbally stated she/he understood.  Updated 10/13/20  [employer]; see attached for 10/16/20 update on paper    Therapeutic Exercise and NMR EXR  [x] (06248) Provided verbal/tactile cueing for activities related to strengthening, flexibility, endurance, ROM for improvements in LE, proximal hip, and core control with self care, mobility, lifting, ambulation. [x] (36578) Provided verbal/tactile cueing for activities related to improving balance, coordination, kinesthetic sense, posture, motor skill, proprioception  to assist with LE, proximal hip, and core control in self care, mobility, lifting, ambulation and eccentric single leg control.      NMR and Therapeutic Activities:    [x] (91776 or 54448) Provided verbal/tactile cueing for activities related to improving balance, coordination, kinesthetic sense, posture, motor skill, proprioception and motor activation to allow for proper function of core, proximal hip and LE with self care and ADLs  [x] (25688) Gait Re-education- Provided training and instruction to the patient for proper LE, core and proximal hip recruitment and positioning and eccentric body weight control with ambulation re-education including up and down stairs     Home Exercise Program:    [x] (14354) Reviewed/Progressed HEP activities related to strengthening, flexibility, endurance, ROM of core, proximal hip and LE for functional self-care, mobility, lifting and ambulation/stair navigation   [x] (77462)Reviewed/Progressed HEP activities related to improving balance, coordination, kinesthetic sense, posture, motor skill, proprioception of core, proximal hip and LE for self care, mobility, lifting, and ambulation/stair navigation      Manual Treatments:  PROM / STM / Oscillations-Mobs:  G-I, II, III, IV (PA's, Inf., Post.)  [x] (25896) Provided manual therapy to mobilize LE, proximal hip and/or LS spine soft tissue/joints for the purpose of modulating pain, promoting relaxation,  increasing ROM, reducing/eliminating soft tissue

## 2020-11-05 ENCOUNTER — HOSPITAL ENCOUNTER (OUTPATIENT)
Dept: PHYSICAL THERAPY | Age: 80
Setting detail: THERAPIES SERIES
Discharge: HOME | End: 2020-11-05
Payer: MEDICARE

## 2020-11-05 PROCEDURE — 97530 THERAPEUTIC ACTIVITIES: CPT | Performed by: PHYSICAL THERAPIST

## 2020-11-05 PROCEDURE — 97016 VASOPNEUMATIC DEVICE THERAPY: CPT | Performed by: PHYSICAL THERAPIST

## 2020-11-05 PROCEDURE — 97140 MANUAL THERAPY 1/> REGIONS: CPT | Performed by: PHYSICAL THERAPIST

## 2020-11-05 PROCEDURE — 97110 THERAPEUTIC EXERCISES: CPT | Performed by: PHYSICAL THERAPIST

## 2020-11-05 NOTE — FLOWSHEET NOTE
The [address] [place] [address] [facility]    Physical Therapy Daily Treatment Note  Date:  2020    Patient Name:  Jesenia Talley    :  1940  MRN: [id number]  Restrictions/Precautions:    Medical/Treatment Diagnosis Information:  · Diagnosis: M17.12 (ICD-10-CM) - Primary osteoarthritis of left knee  · Treatment Diagnosis: M25.562 L Knee Pain; R53.1 Weakness  · S/PLeft TKR, partial MCL release  · DOS: 10/14/20  · Meds: Percocet; ASA  Insurance/Certification information:  PT Insurance Information: PT BENEFITS / AET[employer]/ EFFECTIVE 20/ ACTIVE/  / PAYS 96%/ OOP 1500 . 95/ NO VISIT LIMIT MED NEC/ 0 DASIA/ [employer]ELA REF# [id number]/ BENEFIT FAX IN MEDIA/ 10-7-20 PAG  Physician Information:  Referring Practitioner: Vinnie Park  Has the plan of care been signed (Y/N):        []  Yes  [x]  No     Date of Patient follow up with Physician: 3, 6, 12 weeks PO (plan for initial MD/ PT consult on Tuesday, 10/20/20)  Patient seen in consultation with Dr. Vinnie Park who established the initial/subsequent treatment protocol. 10-20-20: MCL is stable, given medrol dose pack for swelling, ok standard protocol, avoid falling to protect MCL--MD states it is slightly weaker x2-3 weeks after partial release    Is this a Progress Report:     []  Yes  [x]  No        If Yes:  Date Range for reporting period:  Beginning  Ending    Progress report will be due (10 Rx or 30 days whichever is less):        Recertification will be due (POC Duration  / 90 days whichever is less): 21         Visit # Insurance Allowable Auth Required   8 MED NEC []  Yes [x]  No      OUTCOME MEASURE DATE DEFICIT   WOMAC 10/13/20 pre-op 43% Deficit   LEFS Score 10/16/20 (Postop) 94% Deficit                  Patient given satisfaction survey?  [] Yes   [x] No       Latex Allergy:  [x]NO      []YES  Preferred Language for Healthcare:   [x]English       []other:     Pain level:  2- 6/ 10     SUBJECTIVE:   Still notes anterior and lateral knee tightness. Continues URI hose for swelling control; feels ACE produces restriction for movement, so only using for cold therapy (although wore the wrap today)  Steri strips intact. Significant pain issues with little response to Percocet and poor systemic response to the Medrol Dose Pack. Discussion with MD staff which prescribed Motrin 800 mg (using BID); providing very good support. Feels better after PT/ good night's sleep with progressive exercise. .     OBJECTIVE:      11-5-20  ROM PROM AROM Overpressure Comment     L R L R L R     Flexion  105 140        ERMI   Extension  -6 0   -3       Propped  QS                                           10-29-20  Strength L R Comment   Quad 2+ 5  FLORENCIA 0°   Hamstring       Gastroc       Hip  flexion       Hip abd                                11-5-20  Special Test Results/Comment   Homans Negative   Temperature Negative         10-27-20  Girth L R   Mid Patella 44.0 40. Suprapatellar 45.2 40.5   5cm above 44.9 42.1   15cm above         Reflexes/Sensation:               []?Dermatomes/Myotomes intact               []?Reflexes equal and normal bilaterally              [x]? Other: NT     Joint mobility:                []?Normal                       [x]? Hypo: decreased patellar mobility              []?Hyper     Palpation: Moderate generalized due to PO pain and swelling     Functional Mobility/Transfers: Requires minimal assist of 1 for L LE transfer from floor to table     Posture: PO neutral varus (partial MCL release during TKR)     Bandages/Dressings/Incisions: Steri strips intact, incisions are clean/dry; moderate ecchymosis,      Gait: Rolling walker with ~75% PWB; antalgic with limited stance L and limited swing flexion     Orthopedic Special Tests:  Valgus stress test (-) MCL is stable 10-20-20       RESTRICTIONS/PRECAUTIONS: HTN (medicated);     Exercises/Interventions:  Exercise/Equipment Resistance/Repetitions Other comments 11/5/2020     Stretching        Hamstring 30\"x 5 Towel roll; 10# x   Hip Flexor      ITB      Figure 4      Quad      Inclined Calf      Towel Pull 30\"x 5 Towel roll; 10# x            ROM        EOB Self-Assist      Sheet Pull      Wall Slide      Active-EOB 10x KEXT/ FLEX x   Biodex Passive      Recumbent Bike 8' S12; AP x   ERMI 10' 1' holds x   Hang Weights 10' 10# x   Ankle Pumps 5'  HEP                  Patellar Glides        Medial  3'   x   Superior 3'   x   Inferior 3'   x            Isometrics        Quad sets 10x10''  A/S; 10# x   Add sets 10\"x 10 PS: extended x            SLR        Supine 3x10  x   Prone      Abduction 3x10  x   Adducton 2x 10\"x 10 PS x   SLR+                 Glutes        Bridges      Supine Clams      S/L Clams      Side Stepping        Monster walks                 CKC        Weight Shift      Calf raises 3' %WS; 50% x   Wall sits      Step ups  3x 10 FSU; 6\"; TM x   Squatting      CC TKE      SL DL                 PRE        Extension 3x 10 RANGE: 30-0; 0# x   Flexion 3x 10 RANGE: 0-90; 0# x   Leg Press   RANGE: 80-10    Cable Column                 Balance        Tandem Stance 30\"x 3 Tandem; TM x   Tilt board        SLS       Biodex balance  4' PS; L8: fill in x            Other        Treadmill  5' Gait; 1.7 mph; Focus on stride length; heel strike; toe off; stance symmetry; swing flexion x   Gait Training            Manual Interventions            Patient have access to gym? [] Yes  [] No  Patient have equipment at home? [] Yes  [] No     Patient Education:  10/13/20: Educated patient on all pre-op and post-op instructions including but not limited to R.I.C.E., post-op HEP, DVT prevention, warning signs of infection and DVT, and on activity limitations.   10/16/20: Patient education regarding signs/ symptoms for MD phone call vs ER/ instructions for HEP/ instructions for DJO ICEMAN  10/27/20: Discussion regarding ROM OP progression    HEP:  Patient instructed on HEP on this date with handout provided and all questions answered. Patient was instructed to contact PT with any complications or questions about HEP moving forward. Patient verbally stated she/he understood. Updated 10/13/20  [employer] CODE: [id number]; see attached for 10/16/20 update on paper    Therapeutic Exercise and NMR EXR  [x] (20033) Provided verbal/tactile cueing for activities related to strengthening, flexibility, endurance, ROM for improvements in LE, proximal hip, and core control with self care, mobility, lifting, ambulation. [x] (56716) Provided verbal/tactile cueing for activities related to improving balance, coordination, kinesthetic sense, posture, motor skill, proprioception  to assist with LE, proximal hip, and core control in self care, mobility, lifting, ambulation and eccentric single leg control.      NMR and Therapeutic Activities:    [x] (35301 or 97091) Provided verbal/tactile cueing for activities related to improving balance, coordination, kinesthetic sense, posture, motor skill, proprioception and motor activation to allow for proper function of core, proximal hip and LE with self care and ADLs  [x] (21069) Gait Re-education- Provided training and instruction to the patient for proper LE, core and proximal hip recruitment and positioning and eccentric body weight control with ambulation re-education including up and down stairs     Home Exercise Program:    [x] (81309) Reviewed/Progressed HEP activities related to strengthening, flexibility, endurance, ROM of core, proximal hip and LE for functional self-care, mobility, lifting and ambulation/stair navigation   [x] (89864)Reviewed/Progressed HEP activities related to improving balance, coordination, kinesthetic sense, posture, motor skill, proprioception of core, proximal hip and LE for self care, mobility, lifting, and ambulation/stair navigation      Manual Treatments:  PROM / STM / Oscillations-Mobs:  G-I, II, III, IV (PA's, Inf., Post.)  [x] (25924) Provided manual therapy to mobilize LE, proximal hip and/or LS spine soft tissue/joints for the purpose of modulating pain, promoting relaxation,  increasing ROM, reducing/eliminating soft tissue swelling/inflammation/restriction, improving soft tissue extensibility and allowing for proper ROM for normal function with self care, mobility, lifting and ambulation. Modalities:    [] hot packs  [] EMS    [] Ultrasound  [] ice CP  [x] vasopneumatic[de-identified] L2  [] high volt/EGS  [] phono  [] tens     [] ionto  [] autorange/biodex [] Interferential   [] other      Charges:  Timed Code Treatment Minutes: 61'   Total Treatment Minutes: 76'       [] EVAL (LOW) 455 [id number] (typically 20 minutes face-to-face)  [] EVAL (MOD) 18617 (typically 30 minutes face-to-face)  [] EVAL (HIGH) 26288 (typically 45 minutes face-to-face)  [] RE-EVAL   [x] LI(25753) x   2   [] IONTO  [] NMR (78953) x      [x] VASO  [x] Manual (76965) x 1     [] Other:  [x] TA x  1     [] Mech Traction (81274)  [] ES(attended) (33371)      [] ES (un) (96910):     GOALS:   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 Independent in HEP and progression per patient tolerance, in order to prevent re-injury. [] Progressing: [] Met: [] Not Met: [] Adjusted   2. Patient will have a decrease in pain to facilitate improvement in movement, function, and ADLs as indicated by Functional Deficits. [] Progressing: [] Met: [] Not Met: [] Adjusted    Long Term Goals: To be achieved in: 12 weeks  1. Disability index score of 21% or less for the LEFS to assist with reaching prior level of function. [] Progressing: [] Met: [] Not Met: [] Adjusted  2.  Patient will demonstrate increased AROM to 0-125 or greater to allow for proper joint functioning as indicated by patients Functional Deficits. [] Progressing: [] Met: [] Not Met: [] Adjusted  3. Patient will demonstrate an increase in Strength to 5/5 in BLE to allow for proper functional mobility as indicated by patients Functional Deficits. [] Progressing: [] Met: [] Not Met: [] Adjusted  4. Patient will return to ADL and other functional activities without increased symptoms or restriction. [] Progressing: [] Met: [] Not Met: [] Adjusted  5. Patient will be able to ascend/descend 10 stairs without pain in left knee. (patient specific functional goal)    [] Progressing: [] Met: [] Not Met: [] Adjusted    Overall Progression Towards Functional goals/ Treatment Progress Update:  [] Patient is progressing as expected towards functional goals listed. [] Progression is slowed due to complexities/Impairments listed. [] Progression has been slowed due to co-morbidities. [x] Plan just implemented, too soon to assess goals progression <30days   [] Goals require adjustment due to lack of progress  [] Patient is not progressing as expected and requires additional follow up with physician  [] Other    Prognosis for POC: [x] Good [] Fair  [] Poor    Patient requires continued skilled intervention: [x] Yes  [] No    Treatment/Activity Tolerance:  [x] Patient able to complete treatment  [x] Patient limited by fatigue  [] Patient limited by pain     [] Patient limited by other medical complications  [x] Other: Pt tolerated program fairly well. Moderate stiffness with KF ROM and minimal gain from last visit. Pt was able to gain 5° of knee extension ROM after stretching and QS. Will monitor ROM closely for progress. Reviewed ROM frequency/OP program. Pt very receptive to instructions and understands ecxpectations.        PLAN:   [x] Continue per plan of care [] Alter current plan (see comments above)  [] Plan of care initiated [] Hold pending MD visit [] Discharge  Frequency/Duration:  2 days per week for 12 Weeks:  Interventions:    Electronically signed by:      Viridiana Jiménez PT    Note: If patient does not return for scheduled/ recommended follow up visits, this note will serve as a discharge from care along with most recent update on progress.

## 2020-11-10 ENCOUNTER — OFFICE VISIT (OUTPATIENT)
Dept: ORTHOPEDIC SURGERY | Age: 80
End: 2020-11-10

## 2020-11-10 ENCOUNTER — HOSPITAL ENCOUNTER (OUTPATIENT)
Dept: PHYSICAL THERAPY | Age: 80
Setting detail: THERAPIES SERIES
Discharge: HOME | End: 2020-11-10
Payer: MEDICARE

## 2020-11-10 VITALS — BODY MASS INDEX: 26.28 KG/M2 | TEMPERATURE: 97.5 F | HEIGHT: 72 IN | WEIGHT: 194 LBS

## 2020-11-10 PROCEDURE — 97140 MANUAL THERAPY 1/> REGIONS: CPT | Performed by: PHYSICAL THERAPIST

## 2020-11-10 PROCEDURE — 97016 VASOPNEUMATIC DEVICE THERAPY: CPT | Performed by: PHYSICAL THERAPIST

## 2020-11-10 PROCEDURE — 99024 POSTOP FOLLOW-UP VISIT: CPT | Performed by: ORTHOPAEDIC SURGERY

## 2020-11-10 PROCEDURE — 97530 THERAPEUTIC ACTIVITIES: CPT | Performed by: PHYSICAL THERAPIST

## 2020-11-10 PROCEDURE — 97110 THERAPEUTIC EXERCISES: CPT | Performed by: PHYSICAL THERAPIST

## 2020-11-10 RX ORDER — CELECOXIB 100 MG/1
100 CAPSULE ORAL 2 TIMES DAILY
Qty: 60 CAPSULE | Refills: 1 | Status: SHIPPED | OUTPATIENT
Start: 2020-11-10

## 2020-11-10 NOTE — PROGRESS NOTES
[id number] Complaint    Post-Op Check (left knee)      History of Present Illness:  Army Woo is a 68 y.o. male who presents for follow up of left knee(s). The patient is here for 4 weeks follow up from their left total knee arthroplasty on 10/14/2020. The patient is doing very well over all. They are managing their pain with ice and rest. They report their range of motion to be 0 to 110 with some difficulty. They are working closely with physical therapy on the post operative rehabilitation protocol as well as a home exercise program. They report warmth and swelling accompanied with overall knee tightness. Patient is experiencing some pain medially and in the back of his knee along with IT band pain when walking. Patient is concerned about his other knee while ascending stairs and was wondering about a lift for his right foot. The patient stated he does not take medication for pain. 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 This was sustained originally 3 years ago. The patient saw [facility] and saw some improvement with PT for 4-5 months, but the pain has continued to return. His mechanism of injury is he was wearing the [employer] shape ups and was walking to his car whne he rolled his ankle. The patient is having a throbbing pain at night that is making it difficult to sleep thru the night, though he does not feel sleep deprived.      Pain Assessment:  Location: left  Level: 2 out of 10  Duration: NA  Description: achy  Result of an injury: No  Work related injury: No  Aggravating actions: bending  Relieving Factors: ice  Wants injections: No     Past Medical History:   Diagnosis Date    Hypertension     Prolonged emergence from general anesthesia         Past Surgical History:   Procedure Laterality Date    COLONOSCOPY      COLONOSCOPY  10/24/2013    JOINT REPLACEMENT      TOTAL KNEE ARTHROPLASTY Left 10/14/2020    LEFT TOTAL KNEE REPLACEMENT performed by Jordon Moran MD at [facility]       No family history on file. Social History     Socioeconomic History    Marital status:      Spouse name: None    Number of children: None    Years of education: None    Highest education level: None   Occupational History    None   Social Needs    Financial resource strain: None    Food insecurity     Worry: None     Inability: None    Transportation needs     Medical: None     Non-medical: None   Tobacco Use    Smoking status: Never Smoker    Smokeless tobacco: Never Used   Substance and Sexual Activity    Alcohol use: Yes     Alcohol/week: 3.0 standard drinks     Types: 3 Glasses of wine per week    Drug use: Never    Sexual activity: None   Lifestyle    Physical activity     Days per week: None     Minutes per session: None    Stress: None   Relationships    Social connections     Talks on phone: None     Gets together: None     Attends Congregation service: None     Active member of club or organization: None     Attends meetings of clubs or organizations: None     Relationship status: None    Intimate partner violence     Fear of current or ex partner: None     Emotionally abused: None     Physically abused: None     Forced sexual activity: None   Other Topics Concern    None   Social History Narrative    None       Current Outpatient Medications   Medication Sig Dispense Refill    docusate sodium (COLACE) 100 MG capsule Take 1 capsule by mouth 2 times daily 30 capsule 0    senna-docusate (PERICOLACE) 8.6-50 MG per tablet Take 2 tablets by mouth daily as needed for Constipation 30 tablet 0    ondansetron (ZOFRAN) 4 MG tablet Take 1 tablet by mouth every 8 hours as needed for Nausea or Vomiting 30 tablet 0    benazepril (LOTENSIN) 10 MG tablet Take 10 mg by mouth daily.  amLODIPine (NORVASC) 10 MG tablet Take 10 mg by mouth daily. No current facility-administered medications for this visit.         No Known Allergies    Review of Systems:  A 14 point review of systems was completed by the patient and is available in the media section of the scanned medical record and was reviewed on 11/10/2020. The review is negative with the exception of those things mentioned in the HPI and Past Medical History     Vital Signs:   Temp 97.5 °F (36.4 °C) (Infrared)   Ht 6' 0.01\" (1.829 m)   Wt 194 lb 0.1 oz (88 kg)   BMI 26.31 kg/m²     General Exam:   Mental Status: The patient is oriented to time, place and person. The patient's mood and affect are appropriate. Neurological: The patient has good coordination. There is no weakness or sensory deficit. Knee Examination: Left    Skin:  There are no skin lesions, cellulitis, or extreme edema in the lower extremities. Sensation is grossly intact to light touch bilaterally lower extremity. The patient has warm and well-perfused Bilateral lower extremities with brisk capillary refill. Inspection:  The is moderate edema, no gross deformities, and no signs of infection. Surgical incision is healing well with no signs of infection. Palpation: There is no patellofemoral crepitus, there is no significant tenderness over the knee consistent with their post operative status. Range of Motion:  0 to 110  and grossly intact with pain and/or difficulty    Strength: Motor is grossly intact    Special Tests: There is no ligament instability. Gait: antalgic  without the use of assistive devices      Radiology:     Estela Valera x-rays (3 views: AP, lateral and patella views) of his left knee taken 11/10/2020 showed a satisfactory TKR with no evidence of acute fracture or loosening. Office Procedures:  Orders Placed This Encounter   Procedures    XR KNEE LEFT (3 VIEWS)     Standing Status:   Future     Number of Occurrences:   1     Standing Expiration Date:   11/10/2021            Assessment: Estela Valera is a 68 y.o. male who presents for follow up of left knee(s).     Encounter Diagnoses   Name Primary?  Left knee pain, unspecified chronicity     Status post total left knee replacement Yes       Patient's workup and evaluation were reviewed with the patient in the office today. Imaging was also reviewed with the patient in the clinic today. Given the patient's history and physical exam the patient is healing appropriately on the post operative course. Patient will be prescribed Celebrex to help with inflammation and will need an ultrasound to rule in/out a blood clot in his lower leg due to pitting edema present. They should continue to work closely with physical therapy to progress their range of motion and strength gains. Patient should utilize their compression stockinet from surgery to help with swelling. Patient will also need to place a weight on his leg while at home to achieved full extension. This should be done with direction from PT as to duration and frequency. Once the patient has recovered further from his TKR we can provide him with a  referral to a foot specialist. .       Treatment Plan:    1. Activity modification and rest  2. Ice 20 minutes every 1-2 hours PRN  3. NSAIDs PRN  4. Elevation at least 2 inches above the heart with pillows supporting the joint underneath for swelling  5. Home exercises to maintain strength and range of motion  6. Physical therapy including Manual Therapy, Strengthening, Stretching, Joint Mobilization, Gait Training, Neuromuscular Re-Education and Modalities    Diagnoses and treatments were reviewed with the patient today. Patient education material was provided. Questions were entertained and answered to the patient's satisfaction. Follow up: Physical Therapy      ILes ATC am scribing for and in the presence of Dr. Shara Lundberg. The physical examination was performed by Dr. Shara Lundberg. All counseling during the appointment was performed between the patient and Dr. Shara Lundberg.      11/10/20 12:46 PM   Lory Ibarra Colleen Luna  This dictation was performed with a verbal recognition program (DRAGON) and it was checked for errors. It is possible that there are still dictated errors within this office note. If so, please bring any errors to my attention for an addendum. All efforts were made to ensure that this office note is accurate. Supervising Physician Attestation:  I, Dr. Charito Ponce, personally performed the services described in this documentation as scribed above, and it is both accurate and complete and I agree with all pertinent clinical information. I personally interviewed the patient and performed a physical examination and medical decision making. I discussed the patient's condition and treatment options and have  reviewed and agree with the past medical, family and social history unless otherwise noted. All of the patient's questions were answered.       Board Certified Orthopaedic Surgeon  [facility] and [address]  [facility] and [address] and [employer]  Professor of Yahaira W Madiha Stoddard

## 2020-11-10 NOTE — FLOWSHEET NOTE
The [address] [place] [address] [facility]    Physical Therapy Daily Treatment Note  Date:  11/10/2020    Patient Name:  Purnima Nguyen    :  1940  MRN: [id number]  Restrictions/Precautions:    Medical/Treatment Diagnosis Information:  · Diagnosis: M17.12 (ICD-10-CM) - Primary osteoarthritis of left knee  · Treatment Diagnosis: M25.562 L Knee Pain; R53.1 Weakness  · S/PLeft TKR, partial MCL release  · DOS: 10/14/20  · Meds: Percocet; ASA  Insurance/Certification information:  PT Insurance Information: PT BENEFITS / [employer]ET[employer]/ EFFECTIVE 20/ ACTIVE/  / PAYS 96%/ OOP 1500 . 95/ NO VISIT LIMIT MED NEC/ 0 DASIA/ GRABIEL VELA REF# [id number]/ BENEFIT FAX IN MEDIA/ 10-7-20 PAG  Physician Information:  Referring Practitioner: Benito Lobo  Has the plan of care been signed (Y/N):        []  Yes  [x]  No     Date of Patient follow up with Physician:  6, 12 weeks PO (plan for initial MD/ PT consult on Tuesday, 10/20/20)  Patient seen in consultation with Dr. Benito Lobo who established the initial/subsequent treatment protocol.   10-20-20: MCL is stable, given medrol dose pack for swelling, ok standard protocol, avoid falling to protect MCL--MD states it is slightly weaker x2-3 weeks after partial release  11-10-20: 3-4 week PO check upstairs today; overall doing well, with knee stiffness into flexion as primary complaint; PO x-rays today; ROM OP program for both flexion and extension    Is this a Progress Report:     []  Yes  [x]  No        If Yes:  Date Range for reporting period:  Beginning  Ending    Progress report will be due (10 Rx or 30 days whichever is less):        Recertification will be due (POC Duration  / 90 days whichever is less): 21         Visit # Insurance Allowable Auth Required   9 MED NEC []  Yes [x]  No      OUTCOME MEASURE DATE DEFICIT   WOMAC 10/13/20 pre-op 43% Deficit   LEFS Score 10/16/20 (Postop) 94% Deficit                  Patient given satisfaction survey? [] Yes   [x] No       Latex Allergy:  [x]NO      []YES  Preferred Language for Healthcare:   [x]English       []other:     Pain level:  1-8 / 10     SUBJECTIVE:   Still notes anterior and lateral knee tightness. Continues URI hose for swelling control; feels ACE produces restriction for movement, so only using for cold therapy (although wore the wrap today)  Steri strips intact. Significant pain issues with little response to Percocet and poor systemic response to the Medrol Dose Pack. Discussion with MD staff which prescribed Motrin 800 mg (using BID); providing very good support. Feels better after PT/ good night's sleep with progressive exercise. .     OBJECTIVE:      11-10-20  ROM PROM AROM Overpressure Comment     L R L R L R     Flexion  110 140        ERMI   Extension  -6 0   -3       Propped  QS                                           11-10-20  Strength L R Comment   Quad 2+ 5  FLORENCIA 0°   Hamstring       Gastroc       Hip  flexion       Hip abd                                11-10-20  Special Test Results/Comment   Homans Negative   Temperature Negative         10-27-20  Girth L R   Mid Patella 44.0 40. Suprapatellar 45.2 40.5   5cm above 44.9 42.1   15cm above         Reflexes/Sensation:               []?Dermatomes/Myotomes intact               []?Reflexes equal and normal bilaterally              [x]? Other: NT     Joint mobility:                []?Normal                       [x]? Hypo: decreased patellar mobility              []?Hyper     Palpation: Moderate generalized due to PO pain and swelling     Functional Mobility/Transfers: Independent     Posture: PO neutral varus (partial MCL release during TKR)     Bandages/Dressings/Incisions: Steri strips intact, incisions are clean/dry; moderate ecchymosis; mild redness to infra lateral incision     Gait: Walking stick; FWBAT; antalgic with limited stance L and limited swing flexion     Orthopedic Special Tests:  Valgus stress test (-) MCL is stable 10-20-20       RESTRICTIONS/PRECAUTIONS: HTN (medicated); Exercises/Interventions:  Exercise/Equipment Resistance/Repetitions Other comments 11/10/2020     Stretching        Hamstring 30\"x 5 Towel roll; 10# x   Hip Flexor      ITB      Figure 4      Quad      Inclined Calf      Towel Pull 30\"x 5 Towel roll; 10# x            ROM        EOB Self-Assist      Sheet Pull      Wall Slide      Active-EOB 10x KEXT/ FLEX x   Biodex Passive      Recumbent Bike 8' S12; AP x   ERMI 10' 1' holds x   Hang Weights 10' 10# x   Ankle Pumps 5'  HEP                  Patellar Glides        Medial  3'   x   Superior 3'   x   Inferior 3'   x            Isometrics        Quad sets 10x10''  A/S; 10# x   Add sets 10\"x 10 PS: extended x            SLR        Supine 3x10  x   Prone      Abduction 3x10  x   Adducton 2x 10\"x 10 PS x   SLR+                 Glutes        Bridges      Supine Clams      S/L Clams      Side Stepping        Monster walks                 CKC        Weight Shift      Calf raises 3' %WS; 50% x   Wall sits      Step ups  3x 10 FSU; 6\"; TM x   Squatting      CC TKE      SL DL                 PRE        Extension 3x 10 RANGE: 30-0; 0# x   Flexion 3x 10 RANGE: 0-90; 0# x   Leg Press   RANGE: 80-10    Cable Column                 Balance        Tandem Stance 30\"x 3 Tandem; TM x   Tilt board        SLS       Biodex balance  4' RC; L8; LC x            Other        Treadmill  5' Gait; 1.7 mph; Focus on stride length; heel strike; toe off; stance symmetry; swing flexion x   Gait Training 3' Marching; L and R to focus on stance symmetry and adequate H/K FLEX x         Manual Interventions            Patient have access to gym? [] Yes  [] No  Patient have equipment at home?  [] Yes  [] No     Patient Education:  10/13/20: Educated patient on all pre-op and post-op instructions including but not limited to R.I.C.E., post-op HEP, DVT prevention, warning signs of infection and DVT, and on activity limitations. 10/16/20: Patient education regarding signs/ symptoms for MD phone call vs ER/ instructions for HEP/ instructions for DJO ICEMAN  10/27/20: Discussion regarding ROM OP progression    HEP:  Patient instructed on HEP on this date with handout provided and all questions answered. Patient was instructed to contact PT with any complications or questions about HEP moving forward. Patient verbally stated she/he understood. Updated 10/13/20  [place] CODE: [id number]; see attached for 10/16/20 update on paper    Therapeutic Exercise and NMR EXR  [x] (36299) Provided verbal/tactile cueing for activities related to strengthening, flexibility, endurance, ROM for improvements in LE, proximal hip, and core control with self care, mobility, lifting, ambulation. [x] (44907) Provided verbal/tactile cueing for activities related to improving balance, coordination, kinesthetic sense, posture, motor skill, proprioception  to assist with LE, proximal hip, and core control in self care, mobility, lifting, ambulation and eccentric single leg control.      NMR and Therapeutic Activities:    [x] (50347 or 16720) Provided verbal/tactile cueing for activities related to improving balance, coordination, kinesthetic sense, posture, motor skill, proprioception and motor activation to allow for proper function of core, proximal hip and LE with self care and ADLs  [x] (94605) Gait Re-education- Provided training and instruction to the patient for proper LE, core and proximal hip recruitment and positioning and eccentric body weight control with ambulation re-education including up and down stairs     Home Exercise Program:    [x] (51421) Reviewed/Progressed HEP activities related to strengthening, flexibility, endurance, ROM of core, proximal hip and LE for functional self-care, mobility, lifting and ambulation/stair navigation   [x] (50665)Reviewed/Progressed HEP activities related to improving balance, coordination, kinesthetic sense, posture, motor skill, proprioception of core, proximal hip and LE for self care, mobility, lifting, and ambulation/stair navigation      Manual Treatments:  PROM / STM / Oscillations-Mobs:  G-I, II, III, IV (PA's, Inf., Post.)  [x] (23369) Provided manual therapy to mobilize LE, proximal hip and/or LS spine soft tissue/joints for the purpose of modulating pain, promoting relaxation,  increasing ROM, reducing/eliminating soft tissue swelling/inflammation/restriction, improving soft tissue extensibility and allowing for proper ROM for normal function with self care, mobility, lifting and ambulation. Modalities:    [] hot packs  [] EMS    [] Ultrasound  [] ice CP  [x] vasopneumatic[de-identified] L2  [] high volt/EGS  [] phono  [] tens     [] ionto  [] autorange/biodex [] Interferential   [] other      Charges:  Timed Code Treatment Minutes: 61'   Total Treatment Minutes: 76'       [] EVAL (LOW) 455 [id number] (typically 20 minutes face-to-face)  [] EVAL (MOD) 17030 (typically 30 minutes face-to-face)  [] EVAL (HIGH) 70952 (typically 45 minutes face-to-face)  [] RE-EVAL   [x] AS(59891) x   2   [] IONTO  [] NMR (82890) x      [x] VASO  [x] Manual (36065) x 1     [] Other:  [x] TA x  1     [] Mech Traction (66376)  [] ES(attended) (33993)      [] ES (un) (54113):     GOALS:   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 Independent in HEP and progression per patient tolerance, in order to prevent re-injury. [] Progressing: [] Met: [] Not Met: [] Adjusted   2. Patient will have a decrease in pain to facilitate improvement in movement, function, and ADLs as indicated by Functional Deficits. [] Progressing: [] Met: [] Not Met: [] Adjusted    Long Term Goals: To be achieved in: 12 weeks  1.  Disability index score of 21% or less for the LEFS to assist with reaching prior level of function. [] Progressing: [] Met: [] Not Met: [] Adjusted  2. Patient will demonstrate increased AROM to 0-125 or greater to allow for proper joint functioning as indicated by patients Functional Deficits. [] Progressing: [] Met: [] Not Met: [] Adjusted  3. Patient will demonstrate an increase in Strength to 5/5 in BLE to allow for proper functional mobility as indicated by patients Functional Deficits. [] Progressing: [] Met: [] Not Met: [] Adjusted  4. Patient will return to ADL and other functional activities without increased symptoms or restriction. [] Progressing: [] Met: [] Not Met: [] Adjusted  5. Patient will be able to ascend/descend 10 stairs without pain in left knee. (patient specific functional goal)    [] Progressing: [] Met: [] Not Met: [] Adjusted    Overall Progression Towards Functional goals/ Treatment Progress Update:  [] Patient is progressing as expected towards functional goals listed. [] Progression is slowed due to complexities/Impairments listed. [] Progression has been slowed due to co-morbidities. [x] Plan just implemented, too soon to assess goals progression <30days   [] Goals require adjustment due to lack of progress  [] Patient is not progressing as expected and requires additional follow up with physician  [] Other    Prognosis for POC: [x] Good [] Fair  [] Poor    Patient requires continued skilled intervention: [x] Yes  [] No    Treatment/Activity Tolerance:  [x] Patient able to complete treatment  [x] Patient limited by fatigue  [] Patient limited by pain     [] Patient limited by other medical complications  [x] Other: Pt tolerated program fairly well. Moderate stiffness with KF ROM and minimal gain from last visit. Pt was able to gain 5° of knee extension ROM after stretching and QS. Will monitor ROM closely for progress. Reviewed ROM frequency/OP program. Pt very receptive to instructions and understands ecxpectations.        PLAN:   [x] Continue per plan of care [] Alter current plan (see comments above)  [] Plan of care initiated [] Hold pending MD visit [] Discharge  Frequency/Duration:  2 days per week for 12 Weeks:  Interventions:    Updated POC  Updated girth measures   Updated LEFS  Consider ERMI paperwork    Electronically signed by:      Viridiana Jiménez PT    Note: If patient does not return for scheduled/ recommended follow up visits, this note will serve as a discharge from care along with most recent update on progress.

## 2020-11-12 ENCOUNTER — HOSPITAL ENCOUNTER (OUTPATIENT)
Dept: PHYSICAL THERAPY | Age: 80
Setting detail: THERAPIES SERIES
Discharge: HOME | End: 2020-11-12
Payer: MEDICARE

## 2020-11-12 PROCEDURE — 97530 THERAPEUTIC ACTIVITIES: CPT | Performed by: PHYSICAL THERAPIST

## 2020-11-12 PROCEDURE — 97110 THERAPEUTIC EXERCISES: CPT | Performed by: PHYSICAL THERAPIST

## 2020-11-12 PROCEDURE — 97140 MANUAL THERAPY 1/> REGIONS: CPT | Performed by: PHYSICAL THERAPIST

## 2020-11-16 ENCOUNTER — HOSPITAL ENCOUNTER (OUTPATIENT)
Dept: PHYSICAL THERAPY | Age: 80
Setting detail: THERAPIES SERIES
Discharge: HOME | End: 2020-11-16
Payer: MEDICARE

## 2020-11-16 PROCEDURE — 97110 THERAPEUTIC EXERCISES: CPT | Performed by: PHYSICAL THERAPIST

## 2020-11-16 PROCEDURE — 97112 NEUROMUSCULAR REEDUCATION: CPT | Performed by: PHYSICAL THERAPIST

## 2020-11-16 PROCEDURE — 97530 THERAPEUTIC ACTIVITIES: CPT | Performed by: PHYSICAL THERAPIST

## 2020-11-16 PROCEDURE — 97140 MANUAL THERAPY 1/> REGIONS: CPT | Performed by: PHYSICAL THERAPIST

## 2020-11-16 NOTE — FLOWSHEET NOTE
[address] [facility] Portillo      Physical Therapy Daily Treatment Note  Date:  2020    Patient Name:  Torri Sotomayor    :  1940  MRN: [id number]  Restrictions/Precautions:    Medical/Treatment Diagnosis Information:  · Diagnosis: M17.12 (ICD-10-CM) - Primary osteoarthritis of left knee  · Treatment Diagnosis: M25.562 L Knee Pain; R53.1 Weakness  · S/PLeft TKR, partial MCL release  · DOS: 10/14/20  · Meds: Celebrex 1 tab BID (30 days) ; ASA 1 tab BID  Insurance/Certification information:  PT Insurance Information: PT BENEFITS / AETNA/ EFFECTIVE 20/ ACTIVE/  / PAYS 96%/ OOP 1500 . 95/ NO VISIT LIMIT MED NEC/ 0 AUTH/ AYA M. REF# [id number]/ BENEFIT FAX IN MEDIA/ 10-7-20 PAG  Physician Information:  Referring Practitioner: Duong Capellan  Has the plan of care been signed (Y/N):        [x]  Yes  []  No     Date of Patient follow up with Physician:  6, 12 weeks PO (plan for initial MD/ PT consult on Tuesday, 10/20/20)  Patient seen in consultation with Dr. Duong Capellan who established the initial/subsequent treatment protocol.   10-20-20: MCL is stable, given medrol dose pack for swelling, ok standard protocol, avoid falling to protect MCL--MD states it is slightly weaker x2-3 weeks after partial release  11-10-20: 3-4 week PO check upstairs today; overall doing well, with knee stiffness into flexion as primary complaint; PO x-rays today; ROM OP program for both flexion and extension; concerned about swelling-> suggested using the URI hose; states LLD WNL; but awareness of lack of L knee full extension; placed on Celebrex x 4 weeks    Is this a Progress Report:     [x]  Yes  []  No        If Yes:  Date Range for reporting period:  Beginning: 10/13/20  Endin20    Progress report will be due (10 Rx or 30 days whichever is less):        Recertification will be due (POC Duration  / 90 days whichever is less): 21         Visit # Insurance Allowable Auth Required   11 MED NEC []  Yes [x]  No      OUTCOME MEASURE DATE DEFICIT   WOMAC 10/13/20 pre-op 43% Deficit   LEFS Score 10/16/20 (Postop) 94% Deficit   LEFS Score 11/16/20 37% Deficit  (50/ 80= 63%)             Patient given satisfaction survey? [] Yes   [x] No       Latex Allergy:  [x]NO      []YES  Preferred Language for Healthcare:   [x]English       []other:     Pain level:  1-8 / 10     SUBJECTIVE:   Doppler negative for DVT; noted Baker's Cyst filled with blood which should be reabsorbed by the body (per tech at [address]). Pleased with progress; discussion regarding ability to drive. Planning for Doppler US today post treatment, due to Dr. Sahara Palumbo concern for L LE swelling. Also, recommended hanging weights for KEXT OP. Still notes anterior and lateral knee tightness. Continues URI hose for swelling control; feels ACE produces restriction for movement, so only using for cold therapy (although wore the wrap today)  Steri strips intact. .     OBJECTIVE:      11-16-20  ROM PROM AROM Overpressure Comment     L R L R L R     Flexion  119 140        ERMI   Extension  -6 0   -3       Propped  QS                                           11-16-20  Strength L R Comment   Quad 3+/5 5/5  FLORENCIA 0°   Hamstring       Gastroc       Hip  flexion       Hip abd                                11-16-20  Special Test Results/Comment   Homans Negative   Temperature Negative         11-16-20  Girth L R   Mid Patella 44.0 40.4   Suprapatellar 45.3 41.0   5cm above 45.0 42.0   15cm above         Reflexes/Sensation:               []?Dermatomes/Myotomes intact               []?Reflexes equal and normal bilaterally              [x]? Other: NT     Joint mobility:                []?Normal                       [x]? Hypo: decreased patellar mobility              []?Hyper     Palpation: MJL/ medial PF tenderness     Functional Mobility/Transfers: Independent     Posture: PO neutral varus (partial MCL release during TKR)     Bandages/Dressings/Incisions: Steri strips intact, incisions are clean/dry; moderate ecchymosis;   Gait: FWBAT; no assistance; WFL; improved gait pattern     Orthopedic Special Tests:  Valgus stress test (-) MCL is stable 10-20-20       RESTRICTIONS/PRECAUTIONS: HTN (medicated); Exercises/Interventions:  Exercise/Equipment Resistance/Repetitions Other comments 11/16/2020     Stretching        Hamstring 30\"x 5 Towel roll; 15# x   Hip Flexor      ITB      Figure 4      Quad      Inclined Calf      Towel Pull 30\"x 5 Towel roll; 15# x            ROM        EOB Self-Assist      Sheet Pull      Wall Slide      Active-EOB 10x KEXT/ FLEX x   Biodex Passive      Recumbent Bike 8' S12; AP x   ERMI 10' 1' holds x2 x   Hang Weights 10' 15# x   Ankle Pumps 5'  HEP                  Patellar Glides        Medial  3'   x   Superior 3'   x   Inferior 3'   x            Isometrics        Quad sets 10x10''  A/S; 15# x   Add sets 10\"x 10 PS: extended x            SLR        Supine 3x10  x   Prone      Abduction 3x10  x   Adducton 2x 10\"x 10 PS x   SLR+                 Glutes        Bridges      Supine Clams      S/L Clams      Side Stepping        Monster walks                 CKC        Weight Shift      Calf raises 3' %WS; 50% x   Wall sits      Step ups  3x 10 FSU; 8\"; TM x   Squatting      CC TKE      SL DL                 PRE        Extension 3x 10 RANGE: 30-0; 5# x  7.5# NPV   Flexion 3x 10 RANGE: 0-90; 3# x  5# NPV   Leg Press   RANGE: 80-10    Cable Column                 Balance        Tandem Stance 30\"x 3 Tandem; TM x   Tilt board        SLS       Biodex balance  4' RC; L8; LC x            Other        Treadmill  5' Gait; 2.0 mph; Focus on stride length; heel strike; toe off; stance symmetry; swing flexion x   Gait Training 3' Marching; L and R to focus on stance symmetry and adequate H/K FLEX x         Manual Interventions            Patient have access to gym? [] Yes  [] No  Patient have equipment at home?  [] Yes  [] No     Patient Education:  10/13/20: Educated patient on all pre-op and post-op instructions including but not limited to R.I.C.E., post-op HEP, DVT prevention, warning signs of infection and DVT, and on activity limitations. 10/16/20: Patient education regarding signs/ symptoms for MD phone call vs ER/ instructions for HEP/ instructions for DJO ICEMAN  10/27/20: Discussion regarding ROM OP progression  11/12/20: See attached for instructions regarding ROM OP for both FLEX/ EXT; discussion regarding rationale/ procedure of a a Doppler study    HEP:  Patient instructed on HEP on this date with handout provided and all questions answered. Patient was instructed to contact PT with any complications or questions about HEP moving forward. Patient verbally stated she/he understood. Updated 10/13/20  [employer] CODE: [id number]; see attached for 10/16/20 update on paper    Therapeutic Exercise and NMR EXR  [x] (03809) Provided verbal/tactile cueing for activities related to strengthening, flexibility, endurance, ROM for improvements in LE, proximal hip, and core control with self care, mobility, lifting, ambulation. [x] (01322) Provided verbal/tactile cueing for activities related to improving balance, coordination, kinesthetic sense, posture, motor skill, proprioception  to assist with LE, proximal hip, and core control in self care, mobility, lifting, ambulation and eccentric single leg control.      NMR and Therapeutic Activities:    [x] (05668 or 83746) Provided verbal/tactile cueing for activities related to improving balance, coordination, kinesthetic sense, posture, motor skill, proprioception and motor activation to allow for proper function of core, proximal hip and LE with self care and ADLs  [x] (56068) Gait Re-education- Provided training and instruction to the patient for proper LE, core and proximal hip recruitment and positioning and eccentric body weight control with ambulation re-education including up and down stairs     Home Exercise Program:    [x] (74256) Reviewed/Progressed HEP activities related to strengthening, flexibility, endurance, ROM of core, proximal hip and LE for functional self-care, mobility, lifting and ambulation/stair navigation   [x] (13575)Reviewed/Progressed HEP activities related to improving balance, coordination, kinesthetic sense, posture, motor skill, proprioception of core, proximal hip and LE for self care, mobility, lifting, and ambulation/stair navigation      Manual Treatments:  PROM / STM / Oscillations-Mobs:  G-I, II, III, IV (PA's, Inf., Post.)  [x] (00567) Provided manual therapy to mobilize LE, proximal hip and/or LS spine soft tissue/joints for the purpose of modulating pain, promoting relaxation,  increasing ROM, reducing/eliminating soft tissue swelling/inflammation/restriction, improving soft tissue extensibility and allowing for proper ROM for normal function with self care, mobility, lifting and ambulation. Modalities:    [] hot packs  [] EMS    [] Ultrasound  [] ice CP  [] vasopneumatic[de-identified] L2  [] high volt/EGS  [] phono  [] tens     [] ionto  [] autorange/biodex [] Interferential   [] other      Charges:  Timed Code Treatment Minutes: 61'   Total Treatment Minutes: 61'       [] EVAL (LOW) 455 1011 (typically 20 minutes face-to-face)  [] EVAL (MOD) 35551 (typically 30 minutes face-to-face)  [] EVAL (HIGH) 38117 (typically 45 minutes face-to-face)  [] RE-EVAL   [x] LM(03750) x   1   [] IONTO  [x] NMR (34890) x  1    [] VASO  [x] Manual (96334) x 1     [] Other:  [x] TA x  1     [] Mech Traction (65977)  [] ES(attended) (91267)      [] ES (un) (47351):     GOALS:   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 Independent in HEP and progression per patient tolerance, in order to prevent re-injury.      [x] Progressing: [] Met: [x] Not Met: [] Adjusted   2. Patient will have a decrease in pain to facilitate improvement in movement, function, and ADLs as indicated by Functional Deficits. [x] Progressing: [] Met: [x] Not Met: [] Adjusted    Long Term Goals: To be achieved in: 12 weeks  PO  1. Disability index score of 20% or less for the LEFS to assist with reaching prior level of function. [x] Progressing: [] Met: [x] Not Met: [] Adjusted  2. Patient will demonstrate increased AROM to 0-125 or greater to allow for proper joint functioning as indicated by patients Functional Deficits. [x] Progressing: [] Met: [x] Not Met: [] Adjusted  3. Patient will demonstrate an increase in Strength to 5/5 in BLE to allow for proper functional mobility as indicated by patients Functional Deficits. [x] Progressing: [] Met: [x] Not Met: [] Adjusted  4. Patient will return to ADL and other functional activities without increased symptoms or restriction. [x] Progressing: [] Met: [x] Not Met: [] Adjusted  5. Patient will be able to ascend/descend 10 stairs without pain in left knee. (patient specific functional goal)    [x] Progressing: [] Met: [x] Not Met: [] Adjusted    Overall Progression Towards Functional goals/ Treatment Progress Update:  [x] Patient is progressing as expected towards functional goals listed. [] Progression is slowed due to complexities/Impairments listed. [x] Progression has been slowed due to co-morbidities.   [] Plan just implemented, too soon to assess goals progression <30days   [] Goals require adjustment due to lack of progress  [] Patient is not progressing as expected and requires additional follow up with physician  [x] Other: Significant preop varus and lack of full knee extension    Prognosis for POC: [x] Good [] Fair  [] Poor    Patient requires continued skilled intervention: [x] Yes  [] No    Treatment/Activity Tolerance:  [x] Patient able to complete treatment  [x] Patient limited by

## 2020-11-19 ENCOUNTER — HOSPITAL ENCOUNTER (OUTPATIENT)
Dept: PHYSICAL THERAPY | Age: 80
Setting detail: THERAPIES SERIES
Discharge: HOME | End: 2020-11-19
Payer: MEDICARE

## 2020-11-19 PROCEDURE — 97110 THERAPEUTIC EXERCISES: CPT | Performed by: PHYSICAL THERAPY ASSISTANT

## 2020-11-19 PROCEDURE — 97530 THERAPEUTIC ACTIVITIES: CPT | Performed by: PHYSICAL THERAPY ASSISTANT

## 2020-11-19 PROCEDURE — 97140 MANUAL THERAPY 1/> REGIONS: CPT | Performed by: PHYSICAL THERAPY ASSISTANT

## 2020-11-19 NOTE — FLOWSHEET NOTE
[facility]      [facility] Therapy Daily Treatment Note  Date:  2020    Patient Name:  Kateryna Mata    :  1940  MRN: [id number]  Restrictions/Precautions:    Medical/Treatment Diagnosis Information:  · Diagnosis: M17.12 (ICD-10-CM) - Primary osteoarthritis of left knee  · Treatment Diagnosis: M25.562 L Knee Pain; R53.1 Weakness  · S/PLeft TKR, partial MCL release  · DOS: 10/14/20  · Meds: Celebrex 1 tab BID (30 days) ; ASA 1 tab BID  Insurance/Certification information:  PT Insurance Information: PT BENEFITS / AET[employer]/ EFFECTIVE 20/ ACTIVE/  / PAYS 96%/ OOP 1500 . 95/ NO VISIT LIMIT MED NEC/ 0 AUTH/ AYA M. REF# [id number]/ BENEFIT FAX IN MEDIA/ 10-7-20 PAG  Physician Information:  Referring Practitioner: Alec Escobar  Has the plan of care been signed (Y/N):        [x]  Yes  []  No     Date of Patient follow up with Physician:  6, 12 weeks PO (plan for initial MD/ PT consult on Tuesday, 10/20/20)  Patient seen in consultation with Dr. Alec Escobar who established the initial/subsequent treatment protocol.   10-20-20: MCL is stable, given medrol dose pack for swelling, ok standard protocol, avoid falling to protect MCL--MD states it is slightly weaker x2-3 weeks after partial release  11-10-20: 3-4 week PO check upstairs today; overall doing well, with knee stiffness into flexion as primary complaint; PO x-rays today; ROM OP program for both flexion and extension; concerned about swelling-> suggested using the URI hose; states LLD WNL; but awareness of lack of L knee full extension; placed on Celebrex x 4 weeks    Is this a Progress Report:     [x]  Yes  []  No        If Yes:  Date Range for reporting period:  Beginning: 10/13/20  Endin20    Progress report will be due (10 Rx or 30 days whichever is less):        Recertification will be due (POC Duration  / 90 days whichever is less): 21         Visit # MCL release during TKR)     Bandages/Dressings/Incisions: Steri strips intact, incisions are clean/dry; moderate ecchymosis;   Gait: FWBAT; no assistance; WFL; improved gait pattern     Orthopedic Special Tests:  Valgus stress test (-) MCL is stable 10-20-20       RESTRICTIONS/PRECAUTIONS: HTN (medicated); Exercises/Interventions:  Exercise/Equipment Resistance/Repetitions Other comments 11/19/2020     Stretching        Hamstring 30\"x 5 Towel roll; 15# x   Hip Flexor      ITB      Figure 4      Quad      Inclined Calf      Towel Pull 30\"x 5 Towel roll; 15# x            ROM        EOB Self-Assist      Sheet Pull      Wall Slide      Active-EOB 10x KEXT/ FLEX x   Biodex Passive      Recumbent Bike 8' S12; AP x   ERMI 10' 1' holds x2 x   Hang Weights 10' 15# NPV   Ankle Pumps 5'  HEP                  Patellar Glides        Medial  3'   x   Superior 3'   x   Inferior 3'   x            Isometrics        Quad sets 10x10''  A/S; 15# x   Add sets 10\"x 10 PS: extended hep            SLR        Supine 3x10  hep   Prone      Abduction 3x10  hep   Adducton 2x 10\"x 10 PS hep   SLR+                 Glutes        Bridges      Supine Clams      S/L Clams      Side Stepping        Monster walks                 CKC        Weight Shift      Calf raises 3x10  x   Wall sits      Step ups  3x 10 FSU; 8\"; TM x   Squatting      CC TKE      SL DL                 PRE        Extension 3x 10 RANGE: 30-0; 7.5# x   Flexion 3x 10 RANGE: 0-90; 5# x   Leg Press   RANGE: 80-10    Cable Column                 Balance        Tandem Stance 30\"x 3 Tandem; TM NPV   Tilt board        SLS       Biodex balance  4' RC; L8; LC NPV            Other        Treadmill  5' Gait; 2.0 mph; Focus on stride length; heel strike; toe off; stance symmetry; swing flexion    Gait Training 3' Marching; L and R to focus on stance symmetry and adequate H/K FLEX NPV         Manual Interventions            Patient have access to gym?  [] Yes  [] No  Patient have equipment at home? [] Yes  [] No     Patient Education:  10/13/20: Educated patient on all pre-op and post-op instructions including but not limited to R.I.C.E., post-op HEP, DVT prevention, warning signs of infection and DVT, and on activity limitations. 10/16/20: Patient education regarding signs/ symptoms for MD phone call vs ER/ instructions for HEP/ instructions for DJO ICEMAN  10/27/20: Discussion regarding ROM OP progression  11/12/20: See attached for instructions regarding ROM OP for both FLEX/ EXT; discussion regarding rationale/ procedure of a a Doppler study    HEP:  Patient instructed on HEP on this date with handout provided and all questions answered. Patient was instructed to contact PT with any complications or questions about HEP moving forward. Patient verbally stated she/he understood. Updated 10/13/20  [employer] / PedidosJÃ¡ CODE: [id number]; see attached for 10/16/20 update on paper    Therapeutic Exercise and NMR EXR  [x] (83279) Provided verbal/tactile cueing for activities related to strengthening, flexibility, endurance, ROM for improvements in LE, proximal hip, and core control with self care, mobility, lifting, ambulation. [x] (83288) Provided verbal/tactile cueing for activities related to improving balance, coordination, kinesthetic sense, posture, motor skill, proprioception  to assist with LE, proximal hip, and core control in self care, mobility, lifting, ambulation and eccentric single leg control.      NMR and Therapeutic Activities:    [x] (64547 or 64230) Provided verbal/tactile cueing for activities related to improving balance, coordination, kinesthetic sense, posture, motor skill, proprioception and motor activation to allow for proper function of core, proximal hip and LE with self care and ADLs  [x] (67061) Gait Re-education- Provided training and instruction to the patient for proper LE, core and proximal hip recruitment and positioning and eccentric body weight control with ambulation re-education including up and down stairs     Home Exercise Program:    [x] (54904) Reviewed/Progressed HEP activities related to strengthening, flexibility, endurance, ROM of core, proximal hip and LE for functional self-care, mobility, lifting and ambulation/stair navigation   [x] (03170)Reviewed/Progressed HEP activities related to improving balance, coordination, kinesthetic sense, posture, motor skill, proprioception of core, proximal hip and LE for self care, mobility, lifting, and ambulation/stair navigation      Manual Treatments:  PROM / STM / Oscillations-Mobs:  G-I, II, III, IV (PA's, Inf., Post.)  [x] (88726) Provided manual therapy to mobilize LE, proximal hip and/or LS spine soft tissue/joints for the purpose of modulating pain, promoting relaxation,  increasing ROM, reducing/eliminating soft tissue swelling/inflammation/restriction, improving soft tissue extensibility and allowing for proper ROM for normal function with self care, mobility, lifting and ambulation. Modalities: ice to go   [] hot packs  [] EMS    [] Ultrasound  [] ice CP  [] vasopneumatic[de-identified] L2  [] high volt/EGS  [] phono  [] tens     [] ionto  [] autorange/biodex [] Interferential   [] other      Charges:  Timed Code Treatment Minutes: 50'   Total Treatment Minutes: 48'       [] EVAL (LOW) 455 1011 (typically 20 minutes face-to-face)  [] EVAL (MOD) 24965 (typically 30 minutes face-to-face)  [] EVAL (HIGH) 58282 (typically 45 minutes face-to-face)  [] RE-EVAL   [x] UR(63279) x   1   [] IONTO  [] NMR (60798) x      [] VASO  [x] Manual (60814) x 1     [] Other:  [x] TA x  1     [] Mech Traction (16773)  [] ES(attended) (78619)      [] ES (un) (71682):     GOALS:   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 Independent in HEP and progression per patient tolerance, in order to prevent re-injury. limited by fatigue  [] Patient limited by pain     [] Patient limited by other medical complications  [x] Other: Good tolerance to program/ ROM progression. Able to reach 124° KF showing a 5° gain. Continues to lack terminal knee extension which affects gait and quad strength. Will monitor ROM closely for progress. Reviewed ROM frequency/OP program. Pt very receptive to instructions and understands ecxpectations. PLAN:   [x] Continue per plan of care [] Alter current plan (see comments above)  [] Plan of care initiated [] Hold pending MD visit [] Discharge  Frequency/Duration:  2 days per week for 12 Weeks:  Interventions:    Consider ERMI paperwork  Check hanging weights tolerance (due to 15#)  SLR/ PRE progression  Gait training  Hip and core  Balance    Electronically signed by:      Bozena Ace PTA    Note: If patient does not return for scheduled/ recommended follow up visits, this note will serve as a discharge from care along with most recent update on progress.

## 2020-11-23 ENCOUNTER — HOSPITAL ENCOUNTER (OUTPATIENT)
Dept: PHYSICAL THERAPY | Age: 80
Setting detail: THERAPIES SERIES
Discharge: HOME | End: 2020-11-23
Payer: MEDICARE

## 2020-11-23 PROCEDURE — 97016 VASOPNEUMATIC DEVICE THERAPY: CPT | Performed by: PHYSICAL THERAPIST

## 2020-11-23 PROCEDURE — 97112 NEUROMUSCULAR REEDUCATION: CPT | Performed by: PHYSICAL THERAPIST

## 2020-11-23 PROCEDURE — 97140 MANUAL THERAPY 1/> REGIONS: CPT | Performed by: PHYSICAL THERAPIST

## 2020-11-23 PROCEDURE — 97530 THERAPEUTIC ACTIVITIES: CPT | Performed by: PHYSICAL THERAPIST

## 2020-11-23 PROCEDURE — 97110 THERAPEUTIC EXERCISES: CPT | Performed by: PHYSICAL THERAPIST

## 2020-11-23 NOTE — FLOWSHEET NOTE
[facility] [address] [place] [address] [facility]      Physical Therapy Daily Treatment Note  Date:  2020    Patient Name:  Amaury Harrington    :  1940  MRN: [id number]  Restrictions/Precautions:    Medical/Treatment Diagnosis Information:  · Diagnosis: M17.12 (ICD-10-CM) - Primary osteoarthritis of left knee  · Treatment Diagnosis: M25.562 L Knee Pain; R53.1 Weakness  · S/PLeft TKR, partial MCL release  · DOS: 10/14/20  · Meds: Celebrex 1 tab BID (30 days) ; ASA 1 tab BID  Insurance/Certification information:  PT Insurance Information: PT BENEFITS / [employer]/ EFFECTIVE 20/ ACTIVE/  / PAYS 96%/ OOP 1500 . 95/ NO VISIT LIMIT MED NEC/ 0 AUTH/ THOMPSONA M. REF# [id number]/ BENEFIT FAX IN MEDIA/ 10-7-20 PAG  Physician Information:  Referring Practitioner: Zheng Mendez  Has the plan of care been signed (Y/N):        [x]  Yes  []  No     Date of Patient follow up with Physician:  6, 12 weeks PO (plan for initial MD/ PT consult on Tuesday, 10/20/20)  Patient seen in consultation with Dr. Zheng Mendez who established the initial/subsequent treatment protocol.   10-20-20: MCL is stable, given medrol dose pack for swelling, ok standard protocol, avoid falling to protect MCL--MD states it is slightly weaker x2-3 weeks after partial release  11-10-20: 3-4 week PO check upstairs today; overall doing well, with knee stiffness into flexion as primary complaint; PO x-rays today; ROM OP program for both flexion and extension; concerned about swelling-> suggested using the URI hose; states LLD WNL; but awareness of lack of L knee full extension; placed on Celebrex x 4 weeks    Is this a Progress Report:     []  Yes  [x]  No        If Yes:  Date Range for reporting period:  Beginning: 10/13/20  Endin20    Progress report will be due (10 Rx or 30 days whichever is less): 05       Recertification will be due (POC Duration  / 90 days whichever is less): 21         Visit # Insurance Allowable Auth Required   13 MED NEC []  Yes [x]  No      OUTCOME MEASURE DATE DEFICIT   WOMAC 10/13/20 pre-op 43% Deficit   LEFS Score 10/16/20 (Postop) 94% Deficit   LEFS Score 11/16/20 37% Deficit  (50/ 80= 63%)             Patient given satisfaction survey? [] Yes   [x] No       Latex Allergy:  [x]NO      []YES  Preferred Language for Healthcare:   [x]English       []other:     Pain level:  1-8 / 10     SUBJECTIVE:   Continues with gradual improvement. L calf muscle feels relaxed upon waking, but tightens up during the day with increasing WB. Knee is doing well. States he sees daily improvement. Calf tightness remains. Doppler negative for DVT; noted Baker's Cyst filled with blood which should be reabsorbed by the body (per tech at [employer]). Planning for Doppler US today post treatment, due to Dr. Stephan Rosado concern for L LE swelling. Also, recommended hanging weights for KEXT OP. Still notes anterior and lateral knee tightness. Continues URI hose for swelling control; feels ACE produces restriction for movement, so only using for cold therapy (although wore the wrap today)  Steri strips intact. .     OBJECTIVE:      11-23-20  ROM PROM AROM Overpressure Comment     L R L R L R     Flexion  126 140        ERMI   Extension  -6 0   -3       Propped  QS                                           11-16-20  Strength L R Comment   Quad 3+/5 5/5  FLORENCIA 0°   Hamstring       Gastroc       Hip  flexion       Hip abd                                11-23-20  Special Test Results/Comment   Homans Negative   Temperature Negative         11-16-20  Girth L R   Mid Patella 44.0 40.4   Suprapatellar 45.3 41.0   5cm above 45.0 42.0   15cm above         Reflexes/Sensation:               []?Dermatomes/Myotomes intact               []?Reflexes equal and normal bilaterally              [x]? Other: NT     Joint mobility:                []?Normal                       [x]? Hypo: decreased patellar mobility []?Hyper     Palpation: MJL/ medial PF tenderness     Functional Mobility/Transfers: Independent     Posture: PO neutral varus (partial MCL release during TKR)     Bandages/Dressings/Incisions: Steri strips intact, incisions are clean/dry; moderate ecchymosis;   Gait: FWBAT; no assistance; WFL; improved gait pattern     Orthopedic Special Tests:  Valgus stress test (-) MCL is stable 10-20-20       RESTRICTIONS/PRECAUTIONS: HTN (medicated); Exercises/Interventions:  Exercise/Equipment Resistance/Repetitions Other comments 11/23/2020     Stretching        Hamstring 30\"x 5 Towel roll; 15# x   Hip Flexor      ITB      Figure 4      Quad      Inclined Calf 30\"x 5  x   Towel Pull 30\"x 5 Towel roll; 15# x            ROM        EOB Self-Assist      Sheet Pull      Wall Slide      Active-EOB 10x KEXT/ FLEX x   Biodex Passive      Recumbent Bike 8' S12; R 2.0- x   ERMI 10' 1' holds x2 x   Hang Weights 10' 15# x   Ankle Pumps 5'  HEP                  Patellar Glides        Medial  3'   x   Superior 3'   x   Inferior 3'   x            Isometrics        Quad sets 10x10''  A/S; 15# x   Add sets 10\"x 10 PS: extended hep            SLR        Supine 3x10  hep   Prone      Abduction 3x10  hep   Adducton 2x 10\"x 10 PS hep   SLR+                 Glutes        Bridges      Supine Clams      S/L Clams      Side Stepping        Monster walks                 CKC        Weight Shift      Calf raises 3x10  x  20# PV   Wall sits      Step ups  3x 10 FSU; 8\"; TM x   Squatting      CC TKE      SL DL                 PRE        Extension 3x 10 RANGE: 30-0; 10# x   Flexion 3x 10 RANGE: 0-90; 8# x   Leg Press   RANGE: 80-10    Cable Column                 Balance        Tandem Stance 30\"x 3 Tandem; TM NPV   Tilt board        SLS       Biodex balance  4' RC; L8; LC x            Other        Treadmill  5' Gait; 2.5 mph;  Focus on stride length; heel strike; toe off; stance symmetry; swing flexion x   Gait Training 3' Marching; L and R to focus on stance symmetry and adequate H/K FLEX NPV         Manual Interventions            Patient have access to gym? [] Yes  [] No  Patient have equipment at home? [] Yes  [] No     Patient Education:  10/13/20: Educated patient on all pre-op and post-op instructions including but not limited to R.I.C.E., post-op HEP, DVT prevention, warning signs of infection and DVT, and on activity limitations. 10/16/20: Patient education regarding signs/ symptoms for MD phone call vs ER/ instructions for HEP/ instructions for DJO ICEMAN  10/27/20: Discussion regarding ROM OP progression  11/12/20: See attached for instructions regarding ROM OP for both FLEX/ EXT; discussion regarding rationale/ procedure of a a Doppler study    HEP:  Patient instructed on HEP on this date with handout provided and all questions answered. Patient was instructed to contact PT with any complications or questions about HEP moving forward. Patient verbally stated she/he understood. Updated 10/13/20  [employer] CODE: [email or web address]; see attached for 10/16/20 update on paper    Therapeutic Exercise and NMR EXR  [x] (72704) Provided verbal/tactile cueing for activities related to strengthening, flexibility, endurance, ROM for improvements in LE, proximal hip, and core control with self care, mobility, lifting, ambulation. [x] (35066) Provided verbal/tactile cueing for activities related to improving balance, coordination, kinesthetic sense, posture, motor skill, proprioception  to assist with LE, proximal hip, and core control in self care, mobility, lifting, ambulation and eccentric single leg control.      NMR and Therapeutic Activities:    [x] (20380 or 54869) Provided verbal/tactile cueing for activities related to improving balance, coordination, kinesthetic sense, posture, motor skill, proprioception and motor activation to allow for proper function of core, proximal hip and LE with self care and ADLs  [x] (75581) Gait Re-education- Provided training and instruction to the patient for proper LE, core and proximal hip recruitment and positioning and eccentric body weight control with ambulation re-education including up and down stairs     Home Exercise Program:    [x] (25521) Reviewed/Progressed HEP activities related to strengthening, flexibility, endurance, ROM of core, proximal hip and LE for functional self-care, mobility, lifting and ambulation/stair navigation   [x] (14085)Reviewed/Progressed HEP activities related to improving balance, coordination, kinesthetic sense, posture, motor skill, proprioception of core, proximal hip and LE for self care, mobility, lifting, and ambulation/stair navigation      Manual Treatments:  PROM / STM / Oscillations-Mobs:  G-I, II, III, IV (PA's, Inf., Post.)  [x] (69471) Provided manual therapy to mobilize LE, proximal hip and/or LS spine soft tissue/joints for the purpose of modulating pain, promoting relaxation,  increasing ROM, reducing/eliminating soft tissue swelling/inflammation/restriction, improving soft tissue extensibility and allowing for proper ROM for normal function with self care, mobility, lifting and ambulation.      Modalities:  [] hot packs  [] EMS    [] Ultrasound  [] ice CP  [x] vasopneumatic[de-identified] L2; 10'  [] high volt/EGS  [] phono  [] tens     [] ionto  [] autorange/biodex [] Interferential   [] other      Charges:  Timed Code Treatment Minutes: 61'   Total Treatment Minutes: 79'       [] EVAL (LOW) 455 [id number] (typically 20 minutes face-to-face)  [] EVAL (MOD) 17118 (typically 30 minutes face-to-face)  [] EVAL (HIGH) 82731 (typically 45 minutes face-to-face)  [] RE-EVAL   [x] AZ(46290) x   1   [] IONTO  [x] NMR (33174) x  1    [x] VASO  [x] Manual (98898) x 1     [] Other:  [x] TA x  1     [] Mech Traction (30006)  [] ES(attended) (10461)      [] ES (un) (42220):     GOALS:   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 Independent in HEP and progression per patient tolerance, in order to prevent re-injury. [x] Progressing: [] Met: [x] Not Met: [] Adjusted   2. Patient will have a decrease in pain to facilitate improvement in movement, function, and ADLs as indicated by Functional Deficits. [x] Progressing: [] Met: [x] Not Met: [] Adjusted    Long Term Goals: To be achieved in: 12 weeks  PO  1. Disability index score of 20% or less for the LEFS to assist with reaching prior level of function. [x] Progressing: [] Met: [x] Not Met: [] Adjusted  2. Patient will demonstrate increased AROM to 0-125 or greater to allow for proper joint functioning as indicated by patients Functional Deficits. [x] Progressing: [] Met: [x] Not Met: [] Adjusted  3. Patient will demonstrate an increase in Strength to 5/5 in BLE to allow for proper functional mobility as indicated by patients Functional Deficits. [x] Progressing: [] Met: [x] Not Met: [] Adjusted  4. Patient will return to ADL and other functional activities without increased symptoms or restriction. [x] Progressing: [] Met: [x] Not Met: [] Adjusted  5. Patient will be able to ascend/descend 10 stairs without pain in left knee. (patient specific functional goal)    [x] Progressing: [] Met: [x] Not Met: [] Adjusted    Overall Progression Towards Functional goals/ Treatment Progress Update:  [x] Patient is progressing as expected towards functional goals listed. [] Progression is slowed due to complexities/Impairments listed. [x] Progression has been slowed due to co-morbidities.   [] Plan just implemented, too soon to assess goals progression <30days   [] Goals require adjustment due to lack of progress  [] Patient is not progressing as expected and requires additional follow up with physician  [x] Other: Significant preop varus and lack of full knee extension    Prognosis for POC: [x] Good [] Fair  [] Poor    Patient requires continued skilled intervention: [x] Yes  [] No    Treatment/Activity Tolerance:  [x] Patient able to complete treatment  [x] Patient limited by fatigue  [] Patient limited by pain     [] Patient limited by other medical complications  [x] Other: Good tolerance to program/ ROM progression. Able to reach 126° KF showing a 2° gain. Continues to lack terminal knee extension which affects gait and quad strength/ review of necessity for hanging weights. Will monitor ROM closely for progress. Reviewed ROM frequency/OP program. Pt very receptive to instructions and understands ecxpectations. PLAN:   [x] Continue per plan of care [] Alter current plan (see comments above)  [] Plan of care initiated [] Hold pending MD visit [] Discharge  Frequency/Duration:  2 days per week for 12 Weeks:  Interventions:    Consider ERMI paperwork  Check hanging weights tolerance (due to 15#)  SLR/ PRE progression  Gait training  Hip and core  Balance    Electronically signed by:      Jeet Carr, PT    Note: If patient does not return for scheduled/ recommended follow up visits, this note will serve as a discharge from care along with most recent update on progress.

## 2020-11-25 ENCOUNTER — APPOINTMENT (OUTPATIENT)
Dept: PHYSICAL THERAPY | Age: 80
End: 2020-11-25
Payer: MEDICARE

## 2020-12-01 ENCOUNTER — HOSPITAL ENCOUNTER (OUTPATIENT)
Dept: PHYSICAL THERAPY | Age: 80
Setting detail: THERAPIES SERIES
Discharge: HOME | End: 2020-12-01
Payer: MEDICARE

## 2020-12-01 PROCEDURE — 97530 THERAPEUTIC ACTIVITIES: CPT | Performed by: PHYSICAL THERAPY ASSISTANT

## 2020-12-01 PROCEDURE — 97112 NEUROMUSCULAR REEDUCATION: CPT | Performed by: PHYSICAL THERAPY ASSISTANT

## 2020-12-01 PROCEDURE — 97110 THERAPEUTIC EXERCISES: CPT | Performed by: PHYSICAL THERAPY ASSISTANT

## 2020-12-01 PROCEDURE — 97016 VASOPNEUMATIC DEVICE THERAPY: CPT | Performed by: PHYSICAL THERAPY ASSISTANT

## 2020-12-01 NOTE — FLOWSHEET NOTE
[address] and [facility]      Physical Therapy Daily Treatment Note  Date:  2020    Patient Name:  Hanh Reeder    :  1940  MRN: [id number]  Restrictions/Precautions:    Medical/Treatment Diagnosis Information:  · Diagnosis: M17.12 (ICD-10-CM) - Primary osteoarthritis of left knee  · Treatment Diagnosis: M25.562 L Knee Pain; R53.1 Weakness  · S/PLeft TKR, partial MCL release  · DOS: 10/14/20  · Meds: Celebrex 1 tab BID (30 days) ; ASA 1 tab BID  Insurance/Certification information:  PT Insurance Information: PT BENEFITS / AETNA/ EFFECTIVE 20/ ACTIVE/  / PAYS 96%/ OOP 1500 . 95/ NO VISIT LIMIT MED NEC/ 0 AUTH/ AYA M. REF# [id number]/ BENEFIT FAX IN MEDIA/ 10-7-20 PAG  Physician Information:  Referring Practitioner: Mitesh Connor  Has the plan of care been signed (Y/N):        [x]  Yes  []  No     Date of Patient follow up with Physician:  6, 12 weeks PO (plan for initial MD/ PT consult on Tuesday, 10/20/20)  Patient seen in consultation with Dr. Mitesh Connor who established the initial/subsequent treatment protocol.   10-20-20: MCL is stable, given medrol dose pack for swelling, ok standard protocol, avoid falling to protect MCL--MD states it is slightly weaker x2-3 weeks after partial release  11-10-20: 3-4 week PO check upstairs today; overall doing well, with knee stiffness into flexion as primary complaint; PO x-rays today; ROM OP program for both flexion and extension; concerned about swelling-> suggested using the URI hose; states LLD WNL; but awareness of lack of L knee full extension; placed on Celebrex x 4 weeks    Is this a Progress Report:     []  Yes  [x]  No        If Yes:  Date Range for reporting period:  Beginning: 10/13/20  Endin20    Progress report will be due (10 Rx or 30 days whichever is less):        Recertification will be due (POC Duration  / 90 days whichever is less): 21         Visit # Insurance Allowable Auth Required   14 MED NEC []  Yes [x]  No      OUTCOME MEASURE DATE DEFICIT   WOMAC 10/13/20 pre-op 43% Deficit   LEFS Score 10/16/20 (Postop) 94% Deficit   LEFS Score 11/16/20 37% Deficit  (50/ 80= 63%)             Patient given satisfaction survey? [] Yes   [x] No       Latex Allergy:  [x]NO      []YES  Preferred Language for Healthcare:   [x]English       []other:     Pain level:  1-8 / 10     SUBJECTIVE:   7 weeks post op. Swelling has decreased. Feels consistent improvement. Feels that he is ready for the next stage. Pt states he had difficulty after completing gait  at 2.5 mph, incline board, and standing HS curls. 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 Pleased with outcome of sx thus far. Happy with relief of pre op pain. Pre op standing endurance was 1.5 hours max with excruciating pain. Knee is doing well. States he sees daily improvement. Calf tightness remains. Doppler negative for DVT; noted Baker's Cyst filled with blood which should be reabsorbed by the body (per tech at [employer]). Planning for Doppler US today post treatment, due to Dr. Sergio Bergeron concern for L LE swelling. Also, recommended hanging weights for KEXT OP. Still notes anterior and lateral knee tightness. Continues URI hose for swelling control; feels ACE produces restriction for movement, so only using for cold therapy (although wore the wrap today)  Steri strips intact.   .     OBJECTIVE:      12-1-20  ROM PROM AROM Overpressure Comment     L R L R L R     Flexion  130 140        ERMI   Extension  -4 0   -1       Propped  QS                                           11-16-20  Strength L R Comment   Quad 3+/5 5/5  FLORENCIA 0°   Hamstring       Gastroc       Hip  flexion       Hip abd                                12-1-20  Special Test Results/Comment   Homans Negative   Temperature Negative         11-16-20  Girth L R   Mid Patella 44.0 40.4   Suprapatellar 45.3 41.0   5cm above 45.0 42.0   15cm above         Reflexes/Sensation:               []?Dermatomes/Myotomes intact               []?Reflexes equal and normal bilaterally              [x]? Other: NT     Joint mobility:                []?Normal                       [x]? Hypo: decreased patellar mobility              []?Hyper     Palpation: MJL/ medial PF tenderness     Functional Mobility/Transfers: Independent     Posture: PO neutral varus (partial MCL release during TKR)     Bandages/Dressings/Incisions: Steri strips intact, incisions are clean/dry; moderate ecchymosis;   Gait: FWBAT; no assistance; WFL; improved gait pattern     Orthopedic Special Tests:  Valgus stress test (-) MCL is stable 10-20-20       RESTRICTIONS/PRECAUTIONS: HTN (medicated);     Exercises/Interventions:  Exercise/Equipment Resistance/Repetitions Other comments 12/1/2020     Stretching        Hamstring 30\"x 5 Towel roll; 15# x   Hip Flexor      ITB      Figure 4      Quad      Inclined Calf 30\"x 5  hold   Towel Pull 30\"x 5 Towel roll; 15#             ROM        EOB Self-Assist      Sheet Pull      Wall Slide      Active-EOB 10x KEXT/ FLEX    Biodex Passive      Recumbent Bike 8' S12; R 2.0- x   ERMI 10' 1' holds x2 x   Hang Weights 10' 15# x   Ankle Pumps 5'  HEP                  Patellar Glides        Medial  3'   NPV   Superior 3'      Inferior 3'               Isometrics        Quad sets 10x10''  A/S; 15# x   Add sets 10\"x 10 PS: extended hep            SLR        Supine 3x10  hep   Prone      Abduction 3x10  hep   Adducton 2x 10\"x 10 PS hep   SLR+                 Glutes        Bridges 3x10  x   Supine Clams 3x10 Gray band x   S/L Clams      Side Stepping        Monster walks                 CKC        Weight Shift      Calf raises 3x10       Wall sits      Step ups  3x 10 FSU; 8\"; TM    Squatting      CC TKE   NPV   SL DL                 PRE        Extension 3x 10 RANGE: 30-0; 20# DL x   Flexion 3x 10 RANGE: 0-90; 30# DL x   Leg Press 3x10 RANGE: 80-10, 100# DL x   Cable Column                 Balance        Tandem Stance 30\"x 3 Tandem; TM NPV   Tilt board        SLS       Biodex balance  4' RC; L8; LC             Other        Treadmill  5' Gait; 2.5 mph; Focus on stride length; heel strike; toe off; stance symmetry; swing flexion    Gait Training 3' Marching; L and R to focus on stance symmetry and adequate H/K FLEX NPV         Manual Interventions            Patient have access to gym? [] Yes  [] No  Patient have equipment at home? [] Yes  [] No     Patient Education:  10/13/20: Educated patient on all pre-op and post-op instructions including but not limited to R.I.C.E., post-op HEP, DVT prevention, warning signs of infection and DVT, and on activity limitations. 10/16/20: Patient education regarding signs/ symptoms for MD phone call vs ER/ instructions for HEP/ instructions for DJO ICEMAN  10/27/20: Discussion regarding ROM OP progression  11/12/20: See attached for instructions regarding ROM OP for both FLEX/ EXT; discussion regarding rationale/ procedure of a a Doppler study    HEP:  Patient instructed on HEP on this date with handout provided and all questions answered. Patient was instructed to contact PT with any complications or questions about HEP moving forward. Patient verbally stated she/he understood. Updated 10/13/20  [place] CODE: [id number]; see attached for 10/16/20 update on paper    Therapeutic Exercise and NMR EXR  [x] (42009) Provided verbal/tactile cueing for activities related to strengthening, flexibility, endurance, ROM for improvements in LE, proximal hip, and core control with self care, mobility, lifting, ambulation. [x] (79001) Provided verbal/tactile cueing for activities related to improving balance, coordination, kinesthetic sense, posture, motor skill, proprioception  to assist with LE, proximal hip, and core control in self care, mobility, lifting, ambulation and eccentric single leg control.      NMR and Therapeutic Activities:    [x] (83630 or 42385) Provided verbal/tactile cueing for activities related to improving balance, coordination, kinesthetic sense, posture, motor skill, proprioception and motor activation to allow for proper function of core, proximal hip and LE with self care and ADLs  [x] (46712) Gait Re-education- Provided training and instruction to the patient for proper LE, core and proximal hip recruitment and positioning and eccentric body weight control with ambulation re-education including up and down stairs     Home Exercise Program:    [x] (27834) Reviewed/Progressed HEP activities related to strengthening, flexibility, endurance, ROM of core, proximal hip and LE for functional self-care, mobility, lifting and ambulation/stair navigation   [x] (40376)Reviewed/Progressed HEP activities related to improving balance, coordination, kinesthetic sense, posture, motor skill, proprioception of core, proximal hip and LE for self care, mobility, lifting, and ambulation/stair navigation      Manual Treatments:  PROM / STM / Oscillations-Mobs:  G-I, II, III, IV (PA's, Inf., Post.)  [x] (10807) Provided manual therapy to mobilize LE, proximal hip and/or LS spine soft tissue/joints for the purpose of modulating pain, promoting relaxation,  increasing ROM, reducing/eliminating soft tissue swelling/inflammation/restriction, improving soft tissue extensibility and allowing for proper ROM for normal function with self care, mobility, lifting and ambulation.      Modalities:  [] hot packs  [] EMS    [] Ultrasound  [] ice CP  [x] vasopneumatic[de-identified] L2; 10'  [] high volt/EGS  [] phono  [] tens     [] ionto  [] autorange/biodex [] Interferential   [] other      Charges:  Timed Code Treatment Minutes: 45'   Total Treatment Minutes: 55'   9:30-10:25    [] EVAL (LOW) 46848 (typically 20 minutes face-to-face)  [] EVAL (MOD) 96269 (typically 30 minutes face-to-face)  [] EVAL (HIGH) 22905 (typically 45 minutes face-to-face)  [] RE-EVAL   [x] FV(01192) x   1   [] IONTO  [x] NMR (22902) x  1    [x] VASO  [] Manual (88276) x 1     [] Other:  [x] TA x  1     [] Mech Traction (64636)  [] ES(attended) (29501)      [] ES (un) (02785):     GOALS:   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 Independent in HEP and progression per patient tolerance, in order to prevent re-injury. [x] Progressing: [] Met: [x] Not Met: [] Adjusted   2. Patient will have a decrease in pain to facilitate improvement in movement, function, and ADLs as indicated by Functional Deficits. [x] Progressing: [] Met: [x] Not Met: [] Adjusted    Long Term Goals: To be achieved in: 12 weeks  PO  1. Disability index score of 20% or less for the LEFS to assist with reaching prior level of function. [x] Progressing: [] Met: [x] Not Met: [] Adjusted  2. Patient will demonstrate increased AROM to 0-125 or greater to allow for proper joint functioning as indicated by patients Functional Deficits. [x] Progressing: [] Met: [x] Not Met: [] Adjusted  3. Patient will demonstrate an increase in Strength to 5/5 in BLE to allow for proper functional mobility as indicated by patients Functional Deficits. [x] Progressing: [] Met: [x] Not Met: [] Adjusted  4. Patient will return to ADL and other functional activities without increased symptoms or restriction. [x] Progressing: [] Met: [x] Not Met: [] Adjusted  5. Patient will be able to ascend/descend 10 stairs without pain in left knee. (patient specific functional goal)    [x] Progressing: [] Met: [x] Not Met: [] Adjusted    Overall Progression Towards Functional goals/ Treatment Progress Update:  [x] Patient is progressing as expected towards functional goals listed. [] Progression is slowed due to complexities/Impairments listed.   [x] Progression has

## 2020-12-03 ENCOUNTER — HOSPITAL ENCOUNTER (OUTPATIENT)
Dept: PHYSICAL THERAPY | Age: 80
Setting detail: THERAPIES SERIES
Discharge: HOME | End: 2020-12-03
Payer: MEDICARE

## 2020-12-03 PROCEDURE — 97112 NEUROMUSCULAR REEDUCATION: CPT | Performed by: PHYSICAL THERAPY ASSISTANT

## 2020-12-03 PROCEDURE — 97016 VASOPNEUMATIC DEVICE THERAPY: CPT | Performed by: PHYSICAL THERAPY ASSISTANT

## 2020-12-03 PROCEDURE — 97110 THERAPEUTIC EXERCISES: CPT | Performed by: PHYSICAL THERAPY ASSISTANT

## 2020-12-03 PROCEDURE — 97530 THERAPEUTIC ACTIVITIES: CPT | Performed by: PHYSICAL THERAPY ASSISTANT

## 2020-12-03 NOTE — FLOWSHEET NOTE
[facility] [address] [place] [address] [facility]      Physical Therapy Daily Treatment Note  Date:  12/3/2020    Patient Name:  Army Woo    :  1940  MRN: [id number]  Restrictions/Precautions:    Medical/Treatment Diagnosis Information:  · Diagnosis: M17.12 (ICD-10-CM) - Primary osteoarthritis of left knee  · Treatment Diagnosis: M25.562 L Knee Pain; R53.1 Weakness  · S/PLeft TKR, partial MCL release  · DOS: 10/14/20  · Meds: Celebrex 1 tab BID (30 days) ; ASA 1 tab BID  Insurance/Certification information:  PT Insurance Information: PT BENEFITS / AETNA/ EFFECTIVE 20/ ACTIVE/  / PAYS 96%/ OOP 1500 . 95/ NO VISIT LIMIT MED NEC/ 0 AUTH/ AYA M. REF# [id number]/ BENEFIT FAX IN MEDIA/ 10-7-20 PAG  Physician Information:  Referring Practitioner: Emerson Tabares  Has the plan of care been signed (Y/N):        [x]  Yes  []  No     Date of Patient follow up with Physician:  6, 12 weeks PO (plan for initial MD/ PT consult on Tuesday, 10/20/20)  Patient seen in consultation with Dr. Emerson Tabares who established the initial/subsequent treatment protocol.   10-20-20: MCL is stable, given medrol dose pack for swelling, ok standard protocol, avoid falling to protect MCL--MD states it is slightly weaker x2-3 weeks after partial release  11-10-20: 3-4 week PO check upstairs today; overall doing well, with knee stiffness into flexion as primary complaint; PO x-rays today; ROM OP program for both flexion and extension; concerned about swelling-> suggested using the URI hose; states LLD WNL; but awareness of lack of L knee full extension; placed on Celebrex x 4 weeks    Is this a Progress Report:     []  Yes  [x]  No        If Yes:  Date Range for reporting period:  Beginning: 10/13/20  Endin20    Progress report will be due (10 Rx or 30 days whichever is less):        Recertification will be due (POC Duration  / 90 days whichever is less): 21         Visit # Insurance Allowable Auth Required   15 MED NEC []  Yes [x]  No      OUTCOME MEASURE DATE DEFICIT   WOMAC 10/13/20 pre-op 43% Deficit   LEFS Score 10/16/20 (Postop) 94% Deficit   LEFS Score 11/16/20 37% Deficit  (50/ 80= 63%)             Patient given satisfaction survey? [] Yes   [x] No       Latex Allergy:  [x]NO      []YES  Preferred Language for Healthcare:   [x]English       []other:     Pain level:  1-8 / 10     SUBJECTIVE:   7 weeks post op. States he did well after last visit with no ill effects. Swelling has decreased. Feels consistent improvement. 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 Pleased with outcome of sx thus far. Happy with relief of pre op pain. Pre op standing endurance was 1.5 hours max with excruciating pain. Knee is doing well. States he sees daily improvement. Calf tightness remains. Doppler negative for DVT; noted Baker's Cyst filled with blood which should be reabsorbed by the body (per tech at [address]). Planning for Doppler US today post treatment, due to Dr. Duong Capellan concern for L LE swelling. Also, recommended hanging weights for KEXT OP. Still notes anterior and lateral knee tightness. Continues URI hose for swelling control; feels ACE produces restriction for movement, so only using for cold therapy (although wore the wrap today)  Steri strips intact.   .     OBJECTIVE:      12-1-20  ROM PROM AROM Overpressure Comment     L R L R L R     Flexion  130 140        ERMI   Extension  -4 0   -1       Propped  QS                                           11-16-20  Strength L R Comment   Quad 3+/5 5/5  FLORENCIA 0°   Hamstring       Gastroc       Hip  flexion       Hip abd                                12-1-20  Special Test Results/Comment   Homans Negative   Temperature Negative         11-16-20  Girth L R   Mid Patella 44.0 40.4   Suprapatellar 45.3 41.0   5cm above 45.0 42.0   15cm above         Reflexes/Sensation:               []?Dermatomes/Myotomes intact               []?Reflexes equal and normal bilaterally              [x]? Other: NT     Joint mobility:                []?Normal                       [x]? Hypo: decreased patellar mobility              []?Hyper     Palpation: MJL/ medial PF tenderness     Functional Mobility/Transfers: Independent     Posture: PO neutral varus (partial MCL release during TKR)     Bandages/Dressings/Incisions: Steri strips intact, incisions are clean/dry; moderate ecchymosis;   Gait: FWBAT; no assistance; WFL; improved gait pattern     Orthopedic Special Tests:  Valgus stress test (-) MCL is stable 10-20-20       RESTRICTIONS/PRECAUTIONS: HTN (medicated);     Exercises/Interventions:  Exercise/Equipment Resistance/Repetitions Other comments 12/3/2020     Stretching        Hamstring 30\"x 5 Towel roll; 15# x   Hip Flexor      ITB      Figure 4      Quad      Inclined Calf 30\"x 5  hold   Towel Pull 30\"x 5 Towel roll; 15#             ROM        EOB Self-Assist      Sheet Pull      Wall Slide      Active-EOB 10x KEXT/ FLEX    Biodex Passive      Recumbent Bike 8' S12; R 4.0- x   ERMI 10' 1' holds x2 x   Hang Weights 10' 15# x   Ankle Pumps 5'  HEP                  Patellar Glides        Medial  3'   NPV   Superior 3'      Inferior 3'               Isometrics        Quad sets 10x10''  A/S; 15#    Add sets 10\"x 10 PS: extended hep            SLR        Supine 3x10  hep   Prone      Abduction 3x10  hep   Adducton 2x 10\"x 10 PS hep   SLR+                 Glutes        Bridges 3x10  x   Supine Clams 3x10 Gray band x   S/L Clams      Side Stepping        Monster walks                 CKC        Weight Shift      Calf raises 3x10       Wall sits      Step ups  3x 10 FSU; 8\"; TM    Squatting      CC TKE 3x10 7pl x   SL DL                 PRE        Extension 3x 10 RANGE: 30-0; 20# DL x   Flexion 3x 10 RANGE: 0-90; 30# DL x   Leg Press 3x10 RANGE: 80-10, 110# DL x   Cable Column                 Balance        Tandem Stance 30\"x 3 Tandem; TM Provided verbal/tactile cueing for activities related to improving balance, coordination, kinesthetic sense, posture, motor skill, proprioception and motor activation to allow for proper function of core, proximal hip and LE with self care and ADLs  [x] (83199) Gait Re-education- Provided training and instruction to the patient for proper LE, core and proximal hip recruitment and positioning and eccentric body weight control with ambulation re-education including up and down stairs     Home Exercise Program:    [x] (59798) Reviewed/Progressed HEP activities related to strengthening, flexibility, endurance, ROM of core, proximal hip and LE for functional self-care, mobility, lifting and ambulation/stair navigation   [x] (99438)Reviewed/Progressed HEP activities related to improving balance, coordination, kinesthetic sense, posture, motor skill, proprioception of core, proximal hip and LE for self care, mobility, lifting, and ambulation/stair navigation      Manual Treatments:  PROM / STM / Oscillations-Mobs:  G-I, II, III, IV (PA's, Inf., Post.)  [x] (05847) Provided manual therapy to mobilize LE, proximal hip and/or LS spine soft tissue/joints for the purpose of modulating pain, promoting relaxation,  increasing ROM, reducing/eliminating soft tissue swelling/inflammation/restriction, improving soft tissue extensibility and allowing for proper ROM for normal function with self care, mobility, lifting and ambulation.      Modalities:  [] hot packs  [] EMS    [] Ultrasound  [] ice CP  [x] vasopneumatic: L2; 10'  [] high volt/EGS  [] phono  [] tens     [] ionto  [] autorange/biodex [] Interferential   [] other      Charges:  Timed Code Treatment Minutes: 45'   Total Treatment Minutes: 55'   9:30-10:25    [] EVAL (LOW) 18653 (typically 20 minutes face-to-face)  [] EVAL (MOD) 85366 (typically 30 minutes face-to-face)  [] EVAL (HIGH) 83724 (typically 45 minutes face-to-face)  [] RE-EVAL   [x] GD(01585) x   1   [] IONTO  [x] NMR (80852) x  1    [x] VASO  [] Manual (29886) x      [] Other:  [x] TA x  1     [] Mech Traction (77229)  [] ES(attended) (64478)      [] ES (un) (56173):     GOALS:   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 Independent in HEP and progression per patient tolerance, in order to prevent re-injury. [x] Progressing: [] Met: [x] Not Met: [] Adjusted   2. Patient will have a decrease in pain to facilitate improvement in movement, function, and ADLs as indicated by Functional Deficits. [x] Progressing: [] Met: [x] Not Met: [] Adjusted    Long Term Goals: To be achieved in: 12 weeks  PO  1. Disability index score of 20% or less for the LEFS to assist with reaching prior level of function. [x] Progressing: [] Met: [x] Not Met: [] Adjusted  2. Patient will demonstrate increased AROM to 0-125 or greater to allow for proper joint functioning as indicated by patients Functional Deficits. [x] Progressing: [] Met: [x] Not Met: [] Adjusted  3. Patient will demonstrate an increase in Strength to 5/5 in BLE to allow for proper functional mobility as indicated by patients Functional Deficits. [x] Progressing: [] Met: [x] Not Met: [] Adjusted  4. Patient will return to ADL and other functional activities without increased symptoms or restriction. [x] Progressing: [] Met: [x] Not Met: [] Adjusted  5. Patient will be able to ascend/descend 10 stairs without pain in left knee. (patient specific functional goal)    [x] Progressing: [] Met: [x] Not Met: [] Adjusted    Overall Progression Towards Functional goals/ Treatment Progress Update:  [x] Patient is progressing as expected towards functional goals listed. [] Progression is slowed due to complexities/Impairments listed. [x] Progression has been slowed due to co-morbidities.   [] Plan just implemented, too soon to assess goals progression <30days   [] Goals require adjustment due to lack of progress  [] Patient is not progressing as expected and requires additional follow up with physician  [x] Other: Significant preop varus and lack of full knee extension    Prognosis for POC: [x] Good [] Fair  [] Poor    Patient requires continued skilled intervention: [x] Yes  [] No    Treatment/Activity Tolerance:  [x] Patient able to complete treatment  [x] Patient limited by fatigue  [] Patient limited by pain     [] Patient limited by other medical complications  [x] Other: Good tolerance to program/ ROM progression. Able to reach 132° KF ROM but with mild medial knee pain. Continues to lack terminal knee extension which affects gait and quad strength/ review of necessity for hanging weights. Reviewed ROM frequency/OP program. Pt very receptive to instructions and understands ecxpectations. PLAN:   [x] Continue per plan of care [] Alter current plan (see comments above)  [] Plan of care initiated [] Hold pending MD visit [] Discharge  Frequency/Duration:  2 days per week for 12 Weeks:  Interventions:  Check hanging weights tolerance (due to 15#)  SLR/ PRE progression  Gait training  Hip and core  Balance  Cont PT 2x/wk through 8 weeks post op then decrease frequency to 1x/wk for progressions  MD/PT appt 12-17-20    Electronically signed by:      Yury Rock PTA    Note: If patient does not return for scheduled/ recommended follow up visits, this note will serve as a discharge from care along with most recent update on progress.

## 2020-12-07 ENCOUNTER — HOSPITAL ENCOUNTER (OUTPATIENT)
Dept: PHYSICAL THERAPY | Age: 80
Setting detail: THERAPIES SERIES
Discharge: HOME | End: 2020-12-07
Payer: MEDICARE

## 2020-12-07 PROCEDURE — 97110 THERAPEUTIC EXERCISES: CPT | Performed by: PHYSICAL THERAPIST

## 2020-12-07 PROCEDURE — 97016 VASOPNEUMATIC DEVICE THERAPY: CPT | Performed by: PHYSICAL THERAPIST

## 2020-12-07 PROCEDURE — 97530 THERAPEUTIC ACTIVITIES: CPT | Performed by: PHYSICAL THERAPIST

## 2020-12-07 PROCEDURE — 97112 NEUROMUSCULAR REEDUCATION: CPT | Performed by: PHYSICAL THERAPIST

## 2020-12-07 NOTE — FLOWSHEET NOTE
[facility]      Physical Therapy Daily Treatment Note  Date:  2020    Patient Name:  Seb Richards    :  1940  MRN: [id number]  Restrictions/Precautions:    Medical/Treatment Diagnosis Information:  · Diagnosis: M17.12 (ICD-10-CM) - Primary osteoarthritis of left knee  · Treatment Diagnosis: M25.562 L Knee Pain; R53.1 Weakness  · S/PLeft TKR, partial MCL release  · DOS: 10/14/20  · Meds: Celebrex 1 tab BID (30 days) ; ASA 1 tab BID  Insurance/Certification information:  PT Insurance Information: PT BENEFITS / AETNA/ EFFECTIVE 20/ ACTIVE/  / PAYS 96%/ OOP 1500 . 95/ NO VISIT LIMIT MED NEC/ 0 AUTH/ AYA M. REF# [id number]/ BENEFIT FAX IN MEDIA/ 10-7-20 PAG  Physician Information:  Referring Practitioner: Nica Brown  Has the plan of care been signed (Y/N):        [x]  Yes  []  No     Date of Patient follow up with Physician:  6, 12 weeks PO (plan for initial MD/ PT consult on Tuesday, 10/20/20)  Patient seen in consultation with Dr. Nica Brown who established the initial/subsequent treatment protocol.   10-20-20: MCL is stable, given medrol dose pack for swelling, ok standard protocol, avoid falling to protect MCL--MD states it is slightly weaker x2-3 weeks after partial release  11-10-20: 3-4 week PO check upstairs today; overall doing well, with knee stiffness into flexion as primary complaint; PO x-rays today; ROM OP program for both flexion and extension; concerned about swelling-> suggested using the URI hose; states LLD WNL; but awareness of lack of L knee full extension; placed on Celebrex x 4 weeks    Is this a Progress Report:     []  Yes  [x]  No        If Yes:  Date Range for reporting period:  Beginning: 10/13/20  Endin20    Progress report will be due (10 Rx or 30 days whichever is less): 27/10/80       Recertification will be due (POC Duration  / 90 days whichever is less): 21         Visit # Insurance Allowable Auth Required   16 MED NEC []  Yes [x]  No      OUTCOME MEASURE DATE DEFICIT   WOMAC 10/13/20 pre-op 43% Deficit   LEFS Score 10/16/20 (Postop) 94% Deficit   LEFS Score 11/16/20 37% Deficit  (50/ 80= 63%)             Patient given satisfaction survey? [] Yes   [x] No       Latex Allergy:  [x]NO      []YES  Preferred Language for Healthcare:   [x]English       []other:     Pain level:  1-8 / 10     SUBJECTIVE:   7.5 weeks post op. By Friday, knee starting to swell, and felt a subcutaneous stitch \"pop\". Did well with PT and felt good after Thursday's session. Felt tight on the incline board; felt foot made gait pattern difficult. Feels popliteal tightness, and has been using posterior icing. States he did well after last visit with no ill effects. Swelling has decreased. Feels consistent improvement. 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 Pleased with outcome of sx thus far. Happy with relief of pre op pain. Pre op standing endurance was 1.5 hours max with excruciating pain. .     OBJECTIVE:      12-7-20  ROM PROM AROM Overpressure Comment     L R L R L R     Flexion  135 140        ERMI   Extension  -4 0   -1       Propped  QS                                           11-16-20  Strength L R Comment   Quad 3+/5 5/5  FLORENCIA 0°   Hamstring       Gastroc       Hip  flexion       Hip abd                                12-7-20  Special Test Results/Comment   Homans Negative   Temperature Negative         11-16-20  Girth L R   Mid Patella 44.0 40.4   Suprapatellar 45.3 41.0   5cm above 45.0 42.0   15cm above         Reflexes/Sensation:               []?Dermatomes/Myotomes intact               []?Reflexes equal and normal bilaterally              [x]? Other: NT     Joint mobility:                []?Normal                       [x]? Hypo: decreased patellar mobility              []?Hyper     Palpation: MJL/ medial PF tenderness     Functional Mobility/Transfers: Independent     Posture: PO neutral varus (partial MCL release during TKR)     Bandages/Dressings/Incisions: Steri strips intact, incisions are clean/dry; moderate ecchymosis;   Gait: FWBAT; no assistance; WFL; improved gait pattern     Orthopedic Special Tests:  Valgus stress test (-) MCL is stable 10-20-20       RESTRICTIONS/PRECAUTIONS: HTN (medicated); Exercises/Interventions:  Exercise/Equipment Resistance/Repetitions Other comments 12/7/2020     Stretching        Hamstring 30\"x 5 Towel roll; 15# x   Hip Flexor      ITB      Figure 4      Quad      Inclined Calf 30\"x 5  hold   Towel Pull 30\"x 5 Towel roll; 15# x            ROM        EOB Self-Assist      Sheet Pull      Wall Slide      Active-EOB 10x KEXT/ FLEX    Biodex Passive      Recumbent Bike 8' S12; R 4.0- x   ERMI 10' 1' holds x2 x   Hang Weights 10' 15# x   Ankle Pumps 5'  HEP                  Patellar Glides        Medial  3'   x   Superior 3'   x   Inferior 3'   x            Isometrics        Quad sets 10x10''  A/S; 15# x   Add sets 10\"x 10 PS: extended hep            SLR        Supine 3x10  hep   Prone      Abduction 3x10  hep   Adducton 2x 10\"x 10 PS hep   SLR+                 Glutes        Bridges 3x10  x   Supine Clams 3x10 Gray band x   S/L Clams      Side Stepping        Monster walks                 CKC        Weight Shift      Calf raises 3x10       Wall sits      Step ups  3x 10 FSU; 8\"; TM NPV   Squatting      CC TKE 3x10 7pl x   SL DL                 PRE        Extension 3x 10 RANGE: 30-0; 25# DL x   Flexion 3x 10 RANGE: 0-90; 45# DL x   Leg Press 3x10 RANGE: 80-10, 110# DL x  120# NPV   Cable Column                 Balance        Tandem Stance 30\"x 3 Tandem; TM x   Tilt board        SLS       Biodex balance  4' RC; L8; LC x            Other        Treadmill  5' Gait; 2.5 mph;  Focus on stride length; heel strike; toe off; stance symmetry; swing flexion    Gait Training 3' Marching; L and R to focus on stance symmetry and adequate H/K FLEX NPV         Manual Interventions            Patient have access to gym? [] Yes  [] No will not use public facility due to COVID 19  Patient have equipment at home? [x] Yes  [] No Knee ext, bike, TM    Patient Education:  10/13/20: Educated patient on all pre-op and post-op instructions including but not limited to R.I.C.E., post-op HEP, DVT prevention, warning signs of infection and DVT, and on activity limitations. 10/16/20: Patient education regarding signs/ symptoms for MD phone call vs ER/ instructions for HEP/ instructions for DJO ICEMAN  10/27/20: Discussion regarding ROM OP progression  11/12/20: See attached for instructions regarding ROM OP for both FLEX/ EXT; discussion regarding rationale/ procedure of a a Doppler study    HEP:  Patient instructed on HEP on this date with handout provided and all questions answered. Patient was instructed to contact PT with any complications or questions about HEP moving forward. Patient verbally stated she/he understood. Updated 10/13/20  ePub Direct CODE: [id number]; see attached for 10/16/20 update on paper    Therapeutic Exercise and NMR EXR  [x] (18533) Provided verbal/tactile cueing for activities related to strengthening, flexibility, endurance, ROM for improvements in LE, proximal hip, and core control with self care, mobility, lifting, ambulation. [x] (56363) Provided verbal/tactile cueing for activities related to improving balance, coordination, kinesthetic sense, posture, motor skill, proprioception  to assist with LE, proximal hip, and core control in self care, mobility, lifting, ambulation and eccentric single leg control.      NMR and Therapeutic Activities:    [x] (86693 or 29224) Provided verbal/tactile cueing for activities related to improving balance, coordination, kinesthetic sense, posture, motor skill, proprioception and motor activation to allow for proper function of core, proximal hip and LE with self care and ADLs  [x] (18051) Gait Re-education- Provided training and instruction to the patient for proper LE, core and proximal hip recruitment and positioning and eccentric body weight control with ambulation re-education including up and down stairs     Home Exercise Program:    [x] (62360) Reviewed/Progressed HEP activities related to strengthening, flexibility, endurance, ROM of core, proximal hip and LE for functional self-care, mobility, lifting and ambulation/stair navigation   [x] (12182)Reviewed/Progressed HEP activities related to improving balance, coordination, kinesthetic sense, posture, motor skill, proprioception of core, proximal hip and LE for self care, mobility, lifting, and ambulation/stair navigation      Manual Treatments:  PROM / STM / Oscillations-Mobs:  G-I, II, III, IV (PA's, Inf., Post.)  [x] (77487) Provided manual therapy to mobilize LE, proximal hip and/or LS spine soft tissue/joints for the purpose of modulating pain, promoting relaxation,  increasing ROM, reducing/eliminating soft tissue swelling/inflammation/restriction, improving soft tissue extensibility and allowing for proper ROM for normal function with self care, mobility, lifting and ambulation.      Modalities:  [] hot packs  [] EMS    [] Ultrasound  [] ice CP  [x] vasopneumatic: L2; 10'  [] high volt/EGS  [] phono  [] tens     [] ionto  [] autorange/biodex [] Interferential   [] other      Charges:  Timed Code Treatment Minutes: 54'   Total Treatment Minutes: 65'   9:30-11:15    [] EVAL (LOW) [phone] (typically 20 minutes face-to-face)  [] EVAL (MOD) 94121 (typically 30 minutes face-to-face)  [] EVAL (HIGH) 06908 (typically 45 minutes face-to-face)  [] RE-EVAL   [x] AH(68800) x   2   [] IONTO  [x] NMR (42249) x  1    [x] VASO  [] Manual (21178) x      [] Other:  [x] TA x  1     [] Mech Traction (00270)  [] ES(attended) (13544)      [] ES (un) (85856):     GOALS:   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 Independent in HEP and progression per patient tolerance, in order to prevent re-injury. [x] Progressing: [] Met: [x] Not Met: [] Adjusted   2. Patient will have a decrease in pain to facilitate improvement in movement, function, and ADLs as indicated by Functional Deficits. [x] Progressing: [] Met: [x] Not Met: [] Adjusted    Long Term Goals: To be achieved in: 12 weeks  PO  1. Disability index score of 20% or less for the LEFS to assist with reaching prior level of function. [x] Progressing: [] Met: [x] Not Met: [] Adjusted  2. Patient will demonstrate increased AROM to 0-125 or greater to allow for proper joint functioning as indicated by patients Functional Deficits. [x] Progressing: [] Met: [x] Not Met: [] Adjusted  3. Patient will demonstrate an increase in Strength to 5/5 in BLE to allow for proper functional mobility as indicated by patients Functional Deficits. [x] Progressing: [] Met: [x] Not Met: [] Adjusted  4. Patient will return to ADL and other functional activities without increased symptoms or restriction. [x] Progressing: [] Met: [x] Not Met: [] Adjusted  5. Patient will be able to ascend/descend 10 stairs without pain in left knee. (patient specific functional goal)    [x] Progressing: [] Met: [x] Not Met: [] Adjusted    Overall Progression Towards Functional goals/ Treatment Progress Update:  [x] Patient is progressing as expected towards functional goals listed. [] Progression is slowed due to complexities/Impairments listed. [x] Progression has been slowed due to co-morbidities.   [] Plan just implemented, too soon to assess goals progression <30days   [] Goals require adjustment due to lack of progress  [] Patient is not progressing as expected and requires additional follow up with physician  [x] Other: Significant preop varus and lack of full knee extension    Prognosis for POC: [x] Good [] Fair  [] Poor    Patient requires continued skilled intervention: [x] Yes  [] No    Treatment/Activity Tolerance:  [x] Patient able to complete treatment  [x] Patient limited by fatigue  [] Patient limited by pain     [] Patient limited by other medical complications  [x] Other: Good tolerance to program/ ROM progression. Able to reach 132° KF ROM but with mild medial knee pain. Continues to lack terminal knee extension which affects gait and quad strength/ review of necessity for hanging weights. Reviewed ROM frequency/OP program. Pt very receptive to instructions and understands ecxpectations. PLAN:   [x] Continue per plan of care [] Alter current plan (see comments above)  [] Plan of care initiated [] Hold pending MD visit [] Discharge  Frequency/Duration:  2 days per week for 12 Weeks:  Interventions:    Check hanging weights tolerance (due to 15#)  SLR/ PRE progression  Gait training  Hip and core  Balance  Cont PT 2x/wk through 8 weeks post op then decrease frequency to 1x/wk for progressions  MD/PT appt 12-17-20    Electronically signed by:      Humaira Lopez PT    Note: If patient does not return for scheduled/ recommended follow up visits, this note will serve as a discharge from care along with most recent update on progress.

## 2020-12-10 ENCOUNTER — HOSPITAL ENCOUNTER (OUTPATIENT)
Dept: PHYSICAL THERAPY | Age: 80
Setting detail: THERAPIES SERIES
Discharge: HOME | End: 2020-12-10
Payer: MEDICARE

## 2020-12-10 PROCEDURE — 97110 THERAPEUTIC EXERCISES: CPT | Performed by: PHYSICAL THERAPY ASSISTANT

## 2020-12-10 PROCEDURE — 97530 THERAPEUTIC ACTIVITIES: CPT | Performed by: PHYSICAL THERAPY ASSISTANT

## 2020-12-10 PROCEDURE — 97016 VASOPNEUMATIC DEVICE THERAPY: CPT | Performed by: PHYSICAL THERAPY ASSISTANT

## 2020-12-10 PROCEDURE — 97112 NEUROMUSCULAR REEDUCATION: CPT | Performed by: PHYSICAL THERAPY ASSISTANT

## 2020-12-10 NOTE — FLOWSHEET NOTE
[facility] [address] [place] [address] [facility]      Physical Therapy Daily Treatment Note  Date:  12/10/2020    Patient Name:  Amita Meza    :  1940  MRN: [id number]  Restrictions/Precautions:    Medical/Treatment Diagnosis Information:  · Diagnosis: M17.12 (ICD-10-CM) - Primary osteoarthritis of left knee  · Treatment Diagnosis: M25.562 L Knee Pain; R53.1 Weakness  · S/PLeft TKR, partial MCL release  · DOS: 10/14/20  · Meds: Celebrex 1 tab BID (30 days) ; ASA 1 tab BID  Insurance/Certification information:  PT Insurance Information: PT BENEFITS / AET[employer]/ EFFECTIVE 20/ ACTIVE/  / PAYS 96%/ OOP 1500 . 95/ NO VISIT LIMIT MED NEC/ 0 AUTH/ AYA M. REF# [id number]/ BENEFIT FAX IN MEDIA/ 10-7-20 PAG  Physician Information:  Referring Practitioner: Stephan Rosado  Has the plan of care been signed (Y/N):        [x]  Yes  []  No     Date of Patient follow up with Physician:  12 weeks PO   Patient seen in consultation with Dr. Stephan Rosado who established the initial/subsequent treatment protocol.   10-20-20: MCL is stable, given medrol dose pack for swelling, ok standard protocol, avoid falling to protect MCL--MD states it is slightly weaker x2-3 weeks after partial release  11-10-20: 3-4 week PO check upstairs today; overall doing well, with knee stiffness into flexion as primary complaint; PO x-rays today; ROM OP program for both flexion and extension; concerned about swelling-> suggested using the URI hose; states LLD WNL; but awareness of lack of L knee full extension; placed on Celebrex x 4 weeks  12-10-20: doing well, discussion re: anti-inflammatories, over use    Is this a Progress Report:     []  Yes  [x]  No        If Yes:  Date Range for reporting period:  Beginning: 10/13/20  Endin20    Progress report will be due (10 Rx or 30 days whichever is less): 40       Recertification will be due (POC Duration  / 90 days whichever is less): 21 Visit # Insurance Allowable Auth Required   17 MED NEC []  Yes [x]  No      OUTCOME MEASURE DATE DEFICIT   WOMAC 10/13/20 pre-op 43% Deficit   LEFS Score 10/16/20 (Postop) 94% Deficit   LEFS Score 11/16/20 37% Deficit  (50/ 80= 63%)             Patient given satisfaction survey? [] Yes   [x] No       Latex Allergy:  [x]NO      []YES  Preferred Language for Healthcare:   [x]English       []other:     Pain level:  1-8 / 10     SUBJECTIVE:   8 weeks post op. Feels that knee is doing well. States he was cutting wood with chainsaw x 3 hours yesterday. Felt tight on the incline board; felt foot made gait pattern difficult. Feels popliteal tightness, and has been using posterior icing. States he did well after last visit with no ill effects. Swelling has decreased. Feels consistent improvement. 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 Pleased with outcome of sx thus far. Happy with relief of pre op pain. Pre op standing endurance was 1.5 hours max with excruciating pain. .     OBJECTIVE:      12-10-20  ROM PROM AROM Overpressure Comment     L R L R L R     Flexion  135 140        ERMI   Extension  -4 0   -1       Propped  QS                                           11-16-20  Strength L R Comment   Quad 3+/5 5/5  FLORENCIA 0°   Hamstring       Gastroc       Hip  flexion       Hip abd                                12-10-20  Special Test Results/Comment   Homans Negative   Temperature Negative         11-16-20  Girth L R   Mid Patella 44.0 40.4   Suprapatellar 45.3 41.0   5cm above 45.0 42.0   15cm above         Reflexes/Sensation:               []?Dermatomes/Myotomes intact               []?Reflexes equal and normal bilaterally              [x]? Other: NT     Joint mobility:                []?Normal                       [x]? Hypo: decreased patellar mobility              []?Hyper     Palpation: MJL/ medial PF tenderness     Functional Mobility/Transfers: Independent     Posture: PO neutral varus (partial MCL release during TKR)     Bandages/Dressings/Incisions: Steri strips intact, incisions are clean/dry; moderate ecchymosis;   Gait: FWBAT; no assistance; WFL; improved gait pattern     Orthopedic Special Tests:  Valgus stress test (-) MCL is stable 10-20-20       RESTRICTIONS/PRECAUTIONS: HTN (medicated); Exercises/Interventions:  Exercise/Equipment Resistance/Repetitions Other comments 12/10/2020     Stretching        Hamstring 30\"x 5 Towel roll; 15# x   Hip Flexor      ITB      Figure 4      Quad      Inclined Calf 30\"x 5  hold   Towel Pull 30\"x 5 Towel roll; 15# x            ROM        EOB Self-Assist      Sheet Pull      Wall Slide      Active-EOB 10x KEXT/ FLEX    Biodex Passive      Recumbent Bike 8' S12; R 4.0- x   ERMI 10' 1' holds x2 x   Hang Weights 10' 15# x   Ankle Pumps 5'  HEP                  Patellar Glides        Medial  3'      Superior 3'      Inferior 3'               Isometrics        Quad sets 10x10''  A/S; 15# x   Add sets 10\"x 10 PS: extended hep            SLR        Supine 3x10  hep   Prone      Abduction 3x10  hep   Adducton 2x 10\"x 10 PS hep   SLR+                 Glutes        Bridges 3x10  hep   Supine Clams 3x10 Gray band hep   S/L Clams      Side Stepping        Monster walks                 CKC        Weight Shift      Calf raises 3x10       Wall sits      Step ups  3x 10 FSU; 8\"; TM NPV   Squatting      CC TKE 3x10 7pl x   SL DL                 PRE        Extension 3x 10 RANGE: 30-0; 25# DL x   Flexion 3x 10 RANGE: 0-90; 45# DL x   Leg Press 3x10     Cable Column                 Balance        Tandem Stance 30\"x 3 Tandem; TM    Tilt board        SLS       Biodex balance  4' RC; L8; LC x            Other        Treadmill  5' Gait; 2.5 mph;  Focus on stride length; heel strike; toe off; stance symmetry; swing flexion    Gait Training 3' Marching; L and R to focus on stance symmetry and adequate H/K FLEX NPV         Manual Interventions            Patient have access to gym? [] Yes  [] No will not use public facility due to COVID 19  Patient have equipment at home? [x] Yes  [] No Knee ext, bike, TM    Patient Education:  10/13/20: Educated patient on all pre-op and post-op instructions including but not limited to R.I.C.E., post-op HEP, DVT prevention, warning signs of infection and DVT, and on activity limitations. 10/16/20: Patient education regarding signs/ symptoms for MD phone call vs ER/ instructions for HEP/ instructions for DJO ICEMAN  10/27/20: Discussion regarding ROM OP progression  11/12/20: See attached for instructions regarding ROM OP for both FLEX/ EXT; discussion regarding rationale/ procedure of a a Doppler study    HEP:  Patient instructed on HEP on this date with handout provided and all questions answered. Patient was instructed to contact PT with any complications or questions about HEP moving forward. Patient verbally stated she/he understood. Updated 10/13/20  [employer] CODE: [id number]; see attached for 10/16/20 update on paper    Therapeutic Exercise and NMR EXR  [x] (41038) Provided verbal/tactile cueing for activities related to strengthening, flexibility, endurance, ROM for improvements in LE, proximal hip, and core control with self care, mobility, lifting, ambulation. [x] (46799) Provided verbal/tactile cueing for activities related to improving balance, coordination, kinesthetic sense, posture, motor skill, proprioception  to assist with LE, proximal hip, and core control in self care, mobility, lifting, ambulation and eccentric single leg control.      NMR and Therapeutic Activities:    [x] (47129 or 48757) Provided verbal/tactile cueing for activities related to improving balance, coordination, kinesthetic sense, posture, motor skill, proprioception and motor activation to allow for proper function of core, proximal hip and LE with self care and ADLs  [x] (36357) Gait Re-education- Provided training Adjusted    Therapist goals for Patient:   Short Term Goals: To be achieved in: 2 weeks  1. Independent in HEP and progression per patient tolerance, in order to prevent re-injury. [x] Progressing: [] Met: [x] Not Met: [] Adjusted   2. Patient will have a decrease in pain to facilitate improvement in movement, function, and ADLs as indicated by Functional Deficits. [x] Progressing: [] Met: [x] Not Met: [] Adjusted    Long Term Goals: To be achieved in: 12 weeks  PO  1. Disability index score of 20% or less for the LEFS to assist with reaching prior level of function. [x] Progressing: [] Met: [x] Not Met: [] Adjusted  2. Patient will demonstrate increased AROM to 0-125 or greater to allow for proper joint functioning as indicated by patients Functional Deficits. [x] Progressing: [] Met: [x] Not Met: [] Adjusted  3. Patient will demonstrate an increase in Strength to 5/5 in BLE to allow for proper functional mobility as indicated by patients Functional Deficits. [x] Progressing: [] Met: [x] Not Met: [] Adjusted  4. Patient will return to ADL and other functional activities without increased symptoms or restriction. [x] Progressing: [] Met: [x] Not Met: [] Adjusted  5. Patient will be able to ascend/descend 10 stairs without pain in left knee. (patient specific functional goal)    [x] Progressing: [] Met: [x] Not Met: [] Adjusted    Overall Progression Towards Functional goals/ Treatment Progress Update:  [x] Patient is progressing as expected towards functional goals listed. [] Progression is slowed due to complexities/Impairments listed. [x] Progression has been slowed due to co-morbidities.   [] Plan just implemented, too soon to assess goals progression <30days   [] Goals require adjustment due to lack of progress  [] Patient is not progressing as expected and requires additional follow up with physician  [x] Other: Significant preop varus and lack of full knee extension    Prognosis for POC: [x]

## 2020-12-14 ENCOUNTER — HOSPITAL ENCOUNTER (OUTPATIENT)
Dept: PHYSICAL THERAPY | Age: 80
Setting detail: THERAPIES SERIES
Discharge: HOME | End: 2020-12-14
Payer: MEDICARE

## 2020-12-14 PROCEDURE — 97530 THERAPEUTIC ACTIVITIES: CPT | Performed by: PHYSICAL THERAPY ASSISTANT

## 2020-12-14 PROCEDURE — 97016 VASOPNEUMATIC DEVICE THERAPY: CPT | Performed by: PHYSICAL THERAPY ASSISTANT

## 2020-12-14 PROCEDURE — 97112 NEUROMUSCULAR REEDUCATION: CPT | Performed by: PHYSICAL THERAPY ASSISTANT

## 2020-12-14 PROCEDURE — 97110 THERAPEUTIC EXERCISES: CPT | Performed by: PHYSICAL THERAPY ASSISTANT

## 2020-12-14 NOTE — FLOWSHEET NOTE
[facility] [address] [place] [address] [facility]      Physical Therapy Daily Treatment Note  Date:  2020    Patient Name:  Chanda Benitez    :  1940  MRN: [id number]  Restrictions/Precautions:    Medical/Treatment Diagnosis Information:  · Diagnosis: M17.12 (ICD-10-CM) - Primary osteoarthritis of left knee  · Treatment Diagnosis: M25.562 L Knee Pain; R53.1 Weakness  · S/PLeft TKR, partial MCL release  · DOS: 10/14/20  · Meds: Celebrex 1 tab BID (30 days) ; ASA 1 tab BID  Insurance/Certification information:  PT Insurance Information: PT BENEFITS / AETNA/ EFFECTIVE 20/ ACTIVE/  / PAYS 96%/ OOP 1500 . 95/ NO VISIT LIMIT MED NEC/ 0 AUTH/ THOMPSONA M. REF# [id number]/ BENEFIT FAX IN MEDIA/ 10-7-20 PAG  Physician Information:  Referring Practitioner: Griselda Requena  Has the plan of care been signed (Y/N):        [x]  Yes  []  No     Date of Patient follow up with Physician:  12 weeks PO   Patient seen in consultation with Dr. Griselda Requena who established the initial/subsequent treatment protocol.   10-20-20: MCL is stable, given medrol dose pack for swelling, ok standard protocol, avoid falling to protect MCL--MD states it is slightly weaker x2-3 weeks after partial release  11-10-20: 3-4 week PO check upstairs today; overall doing well, with knee stiffness into flexion as primary complaint; PO x-rays today; ROM OP program for both flexion and extension; concerned about swelling-> suggested using the URI hose; states LLD WNL; but awareness of lack of L knee full extension; placed on Celebrex x 4 weeks  12-10-20: doing well, discussion re: anti-inflammatories, over use    Is this a Progress Report:     []  Yes  [x]  No        If Yes:  Date Range for reporting period:  Beginning: 10/13/20  Endin20    Progress report will be due (10 Rx or 30 days whichever is less):        Recertification will be due (POC Duration  / 90 days whichever is less): 21 clean/dry; moderate ecchymosis;   Gait: FWBAT; no assistance; WFL; improved gait pattern     Orthopedic Special Tests:  Valgus stress test (-) MCL is stable 10-20-20       RESTRICTIONS/PRECAUTIONS: HTN (medicated); Exercises/Interventions:  Exercise/Equipment Resistance/Repetitions Other comments 12/14/2020     Stretching        Hamstring 30\"x 5 Towel roll; 15# x   Hip Flexor      ITB      Figure 4      Quad      Inclined Calf 30\"x 5  hold   Towel Pull 30\"x 5 Towel roll; 15# x            ROM        EOB Self-Assist      Sheet Pull      Wall Slide      Active-EOB 10x KEXT/ FLEX    Biodex Passive      Recumbent Bike 8' S12; R 4.0- x   ERMI 10' 1' holds x2 x   Hang Weights 10' 15# x   Ankle Pumps 5'  HEP                  Patellar Glides  5' total   x   Medial       Superior       Inferior                Isometrics        Quad sets 10x10''  A/S; 15# x   Add sets 10\"x 10 PS: extended hep            SLR        Supine 3x10  hep   Prone      Abduction 3x10  hep   Adducton 2x 10\"x 10 PS hep   SLR+                 Glutes        Bridges 3x10  x   Supine Clams 3x10 Gray band x   S/L Clams      Side Stepping        Monster walks                 CKC        Weight Shift      Calf raises 3x10       Wall sits      Step ups 3x Flight of stairs x   Squatting      CC TKE 3x10 7pl x   SL DL                 PRE        Extension 3x 10 RANGE: 30-0; 25# DL x   Flexion 3x 10 RANGE: 0-90; 45# DL x   Leg Press 3x10 80-10, 105# x   Cable Column                 Balance        Tandem Stance 30\"x 3 Tandem; TM    Tilt board        SLS       Biodex balance  4' RC; L8; LC x            Other        Treadmill  5' Gait; 2.5 mph; Focus on stride length; heel strike; toe off; stance symmetry; swing flexion    Gait Training 3' Marching; L and R to focus on stance symmetry and adequate H/K FLEX NPV         Manual Interventions            Patient have access to gym? [] Yes  [] No will not use public facility due to COVID 19  Patient have equipment at home? [x] Yes  [] No Knee ext, bike, TM    Patient Education:  10/13/20: Educated patient on all pre-op and post-op instructions including but not limited to R.I.C.E., post-op HEP, DVT prevention, warning signs of infection and DVT, and on activity limitations. 10/16/20: Patient education regarding signs/ symptoms for MD phone call vs ER/ instructions for HEP/ instructions for DJO ICEMAN  10/27/20: Discussion regarding ROM OP progression  11/12/20: See attached for instructions regarding ROM OP for both FLEX/ EXT; discussion regarding rationale/ procedure of a a Doppler study    HEP:  Patient instructed on HEP on this date with handout provided and all questions answered. Patient was instructed to contact PT with any complications or questions about HEP moving forward. Patient verbally stated she/he understood. Updated 10/13/20  [facility] CODE: [id number]; see attached for 10/16/20 update on paper    Therapeutic Exercise and NMR EXR  [x] (79304) Provided verbal/tactile cueing for activities related to strengthening, flexibility, endurance, ROM for improvements in LE, proximal hip, and core control with self care, mobility, lifting, ambulation. [x] (29870) Provided verbal/tactile cueing for activities related to improving balance, coordination, kinesthetic sense, posture, motor skill, proprioception  to assist with LE, proximal hip, and core control in self care, mobility, lifting, ambulation and eccentric single leg control.      NMR and Therapeutic Activities:    [x] (66318 or 68452) Provided verbal/tactile cueing for activities related to improving balance, coordination, kinesthetic sense, posture, motor skill, proprioception and motor activation to allow for proper function of core, proximal hip and LE with self care and ADLs  [x] (66317) Gait Re-education- Provided training and instruction to the patient for proper LE, core and proximal hip recruitment and positioning and eccentric body weight control with ambulation re-education including up and down stairs     Home Exercise Program:    [x] (77777) Reviewed/Progressed HEP activities related to strengthening, flexibility, endurance, ROM of core, proximal hip and LE for functional self-care, mobility, lifting and ambulation/stair navigation   [x] (31088)Reviewed/Progressed HEP activities related to improving balance, coordination, kinesthetic sense, posture, motor skill, proprioception of core, proximal hip and LE for self care, mobility, lifting, and ambulation/stair navigation      Manual Treatments:  PROM / STM / Oscillations-Mobs:  G-I, II, III, IV (PA's, Inf., Post.)  [x] (04269) Provided manual therapy to mobilize LE, proximal hip and/or LS spine soft tissue/joints for the purpose of modulating pain, promoting relaxation,  increasing ROM, reducing/eliminating soft tissue swelling/inflammation/restriction, improving soft tissue extensibility and allowing for proper ROM for normal function with self care, mobility, lifting and ambulation. Modalities:  [] hot packs  [] EMS    [] Ultrasound  [] ice CP  [x] vasopneumatic: L2; 10'  [] high volt/EGS  [] phono  [] tens     [] ionto  [] autorange/biodex [] Interferential   [] other      Charges:  Timed Code Treatment Minutes: 61'   Total Treatment Minutes: 75'   9:30-10:45    [] EVAL (LOW) 455 1011 (typically 20 minutes face-to-face)  [] EVAL (MOD) 52585 (typically 30 minutes face-to-face)  [] EVAL (HIGH) 67592 (typically 45 minutes face-to-face)  [] RE-EVAL   [x] YV(28999) x   2   [] IONTO  [x] NMR (15085) x  1    [x] VASO  [] Manual (85067) x      [] Other:  [x] TA x  1     [] Mech Traction (95946)  [] ES(attended) (64562)      [] ES (un) (34791):     GOALS:   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  Independent in HEP and progression per patient tolerance, in order to [x] Patient limited by fatigue  [] Patient limited by pain     [] Patient limited by other medical complications  [x] Other: Good tolerance to program/ ROM progression. Cautioned pt to monitor activity level  and increase ice/rest as needed to control for swelling now that he will not be taking Celebrex. Discussion re: medication renewal if needed. Continues to lack terminal knee extension which affects gait and quad strength/ review of necessity for hanging weights. Reviewed ROM frequency/OP program. Pt very receptive to instructions and understands ecxpectations. PLAN:   [x] Continue per plan of care [] Alter current plan (see comments above)  [] Plan of care initiated [] Hold pending MD visit [] Discharge  Frequency/Duration:  2 days per week for 12 Weeks:  Interventions:    Check hanging weights tolerance (due to 15#)  SLR/ PRE progression  Gait training  Hip and core  Balance  Change frequency to PT 1x/wk for progressions  Updated objective measures      Electronically signed by:      Rich Louis PTA    Note: If patient does not return for scheduled/ recommended follow up visits, this note will serve as a discharge from care along with most recent update on progress.

## 2020-12-15 ENCOUNTER — APPOINTMENT (OUTPATIENT)
Dept: PHYSICAL THERAPY | Age: 80
End: 2020-12-15
Payer: MEDICARE

## 2020-12-21 ENCOUNTER — HOSPITAL ENCOUNTER (OUTPATIENT)
Dept: PHYSICAL THERAPY | Age: 80
Setting detail: THERAPIES SERIES
Discharge: HOME | End: 2020-12-21
Payer: MEDICARE

## 2020-12-21 PROCEDURE — 97530 THERAPEUTIC ACTIVITIES: CPT | Performed by: PHYSICAL THERAPIST

## 2020-12-21 PROCEDURE — 97112 NEUROMUSCULAR REEDUCATION: CPT | Performed by: PHYSICAL THERAPIST

## 2020-12-21 PROCEDURE — 97110 THERAPEUTIC EXERCISES: CPT | Performed by: PHYSICAL THERAPIST

## 2020-12-21 PROCEDURE — 97016 VASOPNEUMATIC DEVICE THERAPY: CPT | Performed by: PHYSICAL THERAPIST

## 2020-12-21 NOTE — PLAN OF CARE
The [facility]     Physical Therapy Re-Certification Plan of Care    Dear Dr. July Villagran,     We had the pleasure of treating the following patient for physical therapy services at [address] [place] [address]. A summary of our findings can be found in the  summary below. This includes our updated assessment and progress note. If you have any questions or concerns regarding these findings, please do not hesitate to contact me at the office phone number checked above. Thank you for the referral.       Physician Signature:________________________________Date:__________________  By signing above (or electronic signature), therapists plan is approved by physician        Physical Therapy Daily Treatment Note/ Progress Note  Date:  2020    Patient Name:  Shawnee Vasques    :  1940  MRN: [id number]  Restrictions/Precautions:    Medical/Treatment Diagnosis Information:  · Diagnosis: M17.12 (ICD-10-CM) - Primary osteoarthritis of left knee  · Treatment Diagnosis: M25.562 L Knee Pain; R53.1 Weakness  · S/PLeft TKR, partial MCL release  · DOS: 10/14/20  · Meds: Celebrex 1 tab BID (30 days) ; ASA 1 tab BID  Insurance/Certification information:  PT Insurance Information: PT BENEFITS / AETNA/ EFFECTIVE 20/ ACTIVE/  / PAYS 96%/ OOP 1500 . 95/ NO VISIT LIMIT MED NEC/ 0 AUTH/ AYLIO M. REF# [id number]/ BENEFIT FAX IN MEDIA/ 10-7-20 PAG  Physician Information:  Referring Practitioner: July Villagran  Has the plan of care been signed (Y/N):        [x]  Yes  []  No     Date of Patient follow up with Physician:  12 weeks PO   Patient seen in consultation with Dr. July Villagran who established the initial/subsequent treatment protocol.   10-20-20: MCL is stable, given medrol dose pack for swelling, ok standard protocol, avoid falling to protect MCL--MD states it is slightly weaker x2-3 weeks after partial release 11-10-20: 3-4 week PO check upstairs today; overall doing well, with knee stiffness into flexion as primary complaint; PO x-rays today; ROM OP program for both flexion and extension; concerned about swelling-> suggested using the URI hose; states LLD WNL; but awareness of lack of L knee full extension; placed on Celebrex x 4 weeks  12-10-20: doing well, discussion re: anti-inflammatories, over use    Is this a Progress Report:     [x]  Yes  []  No        If Yes:  Date Range for reporting period:  Beginnin20  Endin20    Progress report will be due (10 Rx or 30 days whichever is less): 28       Recertification will be due (POC Duration  / 90 days whichever is less): 21         Visit # Insurance Allowable Auth Required   18 MED NEC []  Yes [x]  No      OUTCOME MEASURE DATE DEFICIT   WOMAC 10/13/20 pre-op 43% Deficit   LEFS Score 10/16/20 (Postop) 94% Deficit   LEFS Score 20 37% Deficit  (50/ 80= 63%)             Patient given satisfaction survey? [] Yes   [x] No       Latex Allergy:  [x]NO      []YES  Preferred Language for Healthcare:   [x]English       []other:     Pain level:  1-8 / 10     SUBJECTIVE:   9.5 weeks post op. Feels that knee is doing well. Does notice that knee is a little tighter since stopping celebrex. Wants to consider resuming Celebrex. Stated over the weekend, he tried to hang 25# 2x day, and had significant pain/ lack of mobility/ lack of function afterwards for ~24 hours or so. Completely shut down his HEP because of this change. Notes weather related joint changes. States he did well after last visit with no ill effects. Swelling has decreased. Feels consistent improvement. 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 Pleased with outcome of sx thus far. Happy with relief of pre op pain. Pre op standing endurance was 1.5 hours max with excruciating pain.       .     OBJECTIVE:      20 ROM PROM AROM Overpressure Comment     L R L R L R     Flexion  132 140        ERMI   Extension  -4 0   -1       Propped  QS                                           12-21-20  Strength L R Comment   Quad 4-/5 5/5  FLORENCIA 0°   Hamstring       Gastroc       Hip  flexion       Hip abd                                12-21-20  Special Test Results/Comment   Homans Negative   Temperature Negative         12-14-20  Girth L R   Mid Patella 43.8 41   Suprapatellar 44.4 41.5   5cm above 44.4 41.7   Mid calf 37.8 36.2       Reflexes/Sensation:               []?Dermatomes/Myotomes intact               []?Reflexes equal and normal bilaterally              [x]? Other: NT     Joint mobility:                []?Normal                       [x]? Hypo: decreased patellar mobility              []?Hyper     Palpation: MJL/ medial PF tenderness     Functional Mobility/Transfers: Independent     Posture: PO neutral varus (partial MCL release during TKR)     Bandages/Dressings/Incisions: Healed    Gait: FWBAT; no assistance; WFL; improved gait pattern; mild flexed knee stance     Orthopedic Special Tests:  Valgus stress test (-) MCL is stable 10-20-20       RESTRICTIONS/PRECAUTIONS: HTN (medicated);     Exercises/Interventions:  Exercise/Equipment Resistance/Repetitions Other comments 12/21/2020     Stretching        Hamstring 30\"x 5 Towel roll; 15# x   Hip Flexor      ITB      Figure 4      Quad      Inclined Calf 30\"x 5  x   Towel Pull 30\"x 5 Towel roll; 15# x            ROM        EOB Self-Assist      Sheet Pull      Wall Slide      Active-EOB 10x KEXT/ FLEX    Biodex Passive      Recumbent Bike 8' S12; R 4.0- x   ERMI 10' 1' holds x2 x   Hang Weights 10' 15# x   Ankle Pumps 5'  HEP                  Patellar Glides  5' total   x   Medial       Superior       Inferior                Isometrics        Quad sets 10x10''  A/S; 15# x   Add sets 10\"x 10 PS: extended hep            SLR        Supine 3x10  hep   Prone      Abduction 3x10  hep Therapeutic Exercise and NMR EXR  [x] (72188) Provided verbal/tactile cueing for activities related to strengthening, flexibility, endurance, ROM for improvements in LE, proximal hip, and core control with self care, mobility, lifting, ambulation. [x] (55943) Provided verbal/tactile cueing for activities related to improving balance, coordination, kinesthetic sense, posture, motor skill, proprioception  to assist with LE, proximal hip, and core control in self care, mobility, lifting, ambulation and eccentric single leg control.      NMR and Therapeutic Activities:    [x] (05319 or 89527) Provided verbal/tactile cueing for activities related to improving balance, coordination, kinesthetic sense, posture, motor skill, proprioception and motor activation to allow for proper function of core, proximal hip and LE with self care and ADLs  [x] (56331) Gait Re-education- Provided training and instruction to the patient for proper LE, core and proximal hip recruitment and positioning and eccentric body weight control with ambulation re-education including up and down stairs     Home Exercise Program:    [x] (80992) Reviewed/Progressed HEP activities related to strengthening, flexibility, endurance, ROM of core, proximal hip and LE for functional self-care, mobility, lifting and ambulation/stair navigation   [x] (59568)Reviewed/Progressed HEP activities related to improving balance, coordination, kinesthetic sense, posture, motor skill, proprioception of core, proximal hip and LE for self care, mobility, lifting, and ambulation/stair navigation      Manual Treatments:  PROM / STM / Oscillations-Mobs:  G-I, II, III, IV (PA's, Inf., Post.) [x] (15231) Provided manual therapy to mobilize LE, proximal hip and/or LS spine soft tissue/joints for the purpose of modulating pain, promoting relaxation,  increasing ROM, reducing/eliminating soft tissue swelling/inflammation/restriction, improving soft tissue extensibility and allowing for proper ROM for normal function with self care, mobility, lifting and ambulation. Modalities:  [] hot packs  [] EMS    [] Ultrasound  [] ice CP  [x] vasopneumatic: L2; 10'  [] high volt/EGS  [] phono  [] tens     [] ionto  [] autorange/biodex [] Interferential   [] other      Charges:  Timed Code Treatment Minutes: 61'   Total Treatment Minutes: 75'   9:30-10:45    [] EVAL (LOW) 455 1011 (typically 20 minutes face-to-face)  [] EVAL (MOD) 40957 (typically 30 minutes face-to-face)  [] EVAL (HIGH) 35973 (typically 45 minutes face-to-face)  [] RE-EVAL   [x] DP(52874) x   2   [] IONTO  [x] NMR (11568) x  1    [x] VASO  [] Manual (34720) x      [] Other:  [x] TA x  1     [] Mech Traction (26739)  [] ES(attended) (84605)      [] ES (un) (08874):     GOALS:   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 Independent in HEP and progression per patient tolerance, in order to prevent re-injury. [x] Progressing: [] Met: [x] Not Met: [] Adjusted   2. Patient will have a decrease in pain to facilitate improvement in movement, function, and ADLs as indicated by Functional Deficits. [x] Progressing: [] Met: [x] Not Met: [] Adjusted    Long Term Goals: To be achieved in: 12 weeks  PO  1. Disability index score of 20% or less for the LEFS to assist with reaching prior level of function.    [x] Progressing: [] Met: [x] Not Met: [] Adjusted 2. Patient will demonstrate increased AROM to 0-125 or greater to allow for proper joint functioning as indicated by patients Functional Deficits. [x] Progressing: [] Met: [x] Not Met: [] Adjusted  3. Patient will demonstrate an increase in Strength to 5/5 in BLE to allow for proper functional mobility as indicated by patients Functional Deficits. [x] Progressing: [] Met: [x] Not Met: [] Adjusted  4. Patient will return to ADL and other functional activities without increased symptoms or restriction. [x] Progressing: [] Met: [x] Not Met: [] Adjusted  5. Patient will be able to ascend/descend 10 stairs without pain in left knee. (patient specific functional goal)    [x] Progressing: [] Met: [x] Not Met: [] Adjusted    Overall Progression Towards Functional goals/ Treatment Progress Update:  [x] Patient is progressing as expected towards functional goals listed. [] Progression is slowed due to complexities/Impairments listed. [x] Progression has been slowed due to co-morbidities.   [] Plan just implemented, too soon to assess goals progression <30days   [] Goals require adjustment due to lack of progress  [] Patient is not progressing as expected and requires additional follow up with physician  [x] Other: Significant preop varus and lack of full knee extension    Prognosis for POC: [x] Good [] Fair  [] Poor    Patient requires continued skilled intervention: [x] Yes  [] No    Treatment/Activity Tolerance:  [x] Patient able to complete treatment  [x] Patient limited by fatigue  [] Patient limited by pain     [] Patient limited by other medical complications [x] Other: Good tolerance to program/ ROM progression. Cautioned pt to monitor activity level  and increase ice/rest as needed to control for swelling now that he will not be taking Celebrex. Discussion re: medication renewal if needed Damiánom Segurady to review with Dr. Andra Gibbs for Celebrex refill). Continues to lack terminal knee extension which affects gait and quad strength/ review of necessity for hanging weights. Reviewed ROM frequency/OP program. Pt very receptive to instructions and understands ecxpectations. PLAN:   [x] Continue per plan of care [] Alter current plan (see comments above)  [] Plan of care initiated [] Hold pending MD visit [] Discharge  Frequency/Duration:  2 days per week for 12 Weeks:  Interventions:    Check hanging weights tolerance (due to 15#)  SLR/ PRE progression  Gait training  Hip and core  Balance  Change frequency to PT 1x/wk for progressions  Updated objective measures  Updated LEFS      Electronically signed by:      Teddy Alas, PT    Note: If patient does not return for scheduled/ recommended follow up visits, this note will serve as a discharge from care along with most recent update on progress.

## 2020-12-28 ENCOUNTER — HOSPITAL ENCOUNTER (OUTPATIENT)
Dept: PHYSICAL THERAPY | Age: 80
Setting detail: THERAPIES SERIES
Discharge: HOME | End: 2020-12-28
Payer: MEDICARE

## 2020-12-28 PROCEDURE — 97110 THERAPEUTIC EXERCISES: CPT | Performed by: PHYSICAL THERAPY ASSISTANT

## 2020-12-28 PROCEDURE — 97112 NEUROMUSCULAR REEDUCATION: CPT | Performed by: PHYSICAL THERAPY ASSISTANT

## 2020-12-28 NOTE — FLOWSHEET NOTE
Recertification will be due (POC Duration  / 90 days whichever is less): 1/13/21         Visit # Insurance Allowable Auth Required   19 MED NEC []  Yes [x]  No      OUTCOME MEASURE DATE DEFICIT   WOMAC 10/13/20 pre-op 43% Deficit   LEFS Score 10/16/20 (Postop) 94% Deficit   LEFS Score 11/16/20 37% Deficit  (50/ 80= 63%)   LEFS Score 12-28-20 29% deficit   (57/80=71%)        Patient given satisfaction survey? [] Yes   [x] No       Latex Allergy:  [x]NO      []YES  Preferred Language for Healthcare:   [x]English       []other:     Pain level:  1-8 / 10     SUBJECTIVE:   10 weeks post op. Noticeable benefit from celebrex. Able to stand majority of day. Ascends/descends stairs without issue. Pleased with outcome of surgery. Happy with relief of pre op pain. Pre op standing endurance was 1.5 hours max with excruciating pain. .     OBJECTIVE:      12-28-20  ROM PROM AROM Overpressure Comment     L R L R L R     Flexion  130 140        ERMI   Extension  -4 0   -1       Propped  QS                                           12-21-20  Strength L R Comment   Quad 4-/5 5/5  FLORENCIA 0°   Hamstring       Gastroc       Hip  flexion       Hip abd                                12-21-20  Special Test Results/Comment   Homans Negative   Temperature Negative         12-28-20  Girth L R   Mid Patella 44.5 41   Suprapatellar 44.8 41.5   5cm above 44.2 41.7       Reflexes/Sensation:               []?Dermatomes/Myotomes intact               []?Reflexes equal and normal bilaterally              [x]? Other: NT     Joint mobility:                []?Normal                       [x]? Hypo: decreased patellar mobility              []?Hyper     Palpation: MJL/ medial PF tenderness     Functional Mobility/Transfers: Independent     Posture: PO neutral varus (partial MCL release during TKR)     Bandages/Dressings/Incisions: Healed    Gait: FWBAT; no assistance; WFL; improved gait pattern; mild flexed knee stance     Orthopedic Special Tests: Valgus stress test (-) MCL is stable 10-20-20       RESTRICTIONS/PRECAUTIONS: HTN (medicated); Exercises/Interventions:  Exercise/Equipment Resistance/Repetitions Other comments 12/28/2020     Stretching        Hamstring 30\"x 5 Towel roll; 15# x   Hip Flexor      ITB      Figure 4      Quad      Inclined Calf 30\"x 5  x   Towel Pull 30\"x 5 Towel roll; 15# x            ROM        EOB Self-Assist      Sheet Pull      Wall Slide      Active-EOB 10x KEXT/ FLEX    Biodex Passive      Recumbent Bike 8' S12; R 4.0- x   ERMI 10' 1' holds x2 x   Hang Weights 10' 15# x   Ankle Pumps 5'  HEP                  Patellar Glides  5' total      Medial       Superior       Inferior                Isometrics        Quad sets 10x10''  A/S; 15# x   Add sets 10\"x 10 PS: extended hep            SLR        Supine 3x10  hep   Prone      Abduction 3x10  hep   Adducton 2x 10\"x 10 PS hep   SLR+                 Glutes        Bridges 3x10  NPV   Supine Clams 3x10 Gray band NPV   S/L Clams      Side Stepping        Monster walks                 CKC        Weight Shift      Calf raises 3x10       Wall sits      Step ups 3x Flight of stairs NPV   Squatting      CC TKE 3x10 7pl x   SL DL                 PRE        Extension 3x 10 RANGE: 30-0; 30# DL x   Flexion 3x 10 (S .5 hole; L3) RANGE: 0-90; 50# DL x   Leg Press 3x10 (S1 hole; flat w/ pillow) 80-10, 110# x   Cable Column                 Balance        Tandem Stance 30\"x 3 Tandem on aero beam; TM x   Tilt board        SLS       Biodex balance  4' RC; L8; LC             Other        Treadmill  5' Gait; 2.5 mph; Focus on stride length; heel strike; toe off; stance symmetry; swing flexion    Gait Training 3' Marching; L and R to focus on stance symmetry and adequate H/K FLEX    Lateral walking  3x 50' Blue x               Patient have access to gym? [] Yes  [] No will not use public facility due to COVID 19  Patient have equipment at home?  [x] Yes  [] No Knee ext, bike, TM    Patient Education: Home Exercise Program:    [x] (50237) Reviewed/Progressed HEP activities related to strengthening, flexibility, endurance, ROM of core, proximal hip and LE for functional self-care, mobility, lifting and ambulation/stair navigation   [x] (78261)Reviewed/Progressed HEP activities related to improving balance, coordination, kinesthetic sense, posture, motor skill, proprioception of core, proximal hip and LE for self care, mobility, lifting, and ambulation/stair navigation      Manual Treatments:  PROM / STM / Oscillations-Mobs:  G-I, II, III, IV (PA's, Inf., Post.)  [x] (59462) Provided manual therapy to mobilize LE, proximal hip and/or LS spine soft tissue/joints for the purpose of modulating pain, promoting relaxation,  increasing ROM, reducing/eliminating soft tissue swelling/inflammation/restriction, improving soft tissue extensibility and allowing for proper ROM for normal function with self care, mobility, lifting and ambulation. Modalities: ice to go  [] hot packs  [] EMS    [] Ultrasound  [] ice CP  [] vasopneumatic: L2; 10'  [] high volt/EGS  [] phono  [] tens     [] ionto  [] autorange/biodex [] Interferential   [] other      Charges:  Timed Code Treatment Minutes: 48'   Total Treatment Minutes: 50'   1:00-2:00    [] EVAL (LOW) 455 1011 (typically 20 minutes face-to-face)  [] EVAL (MOD) 45455 (typically 30 minutes face-to-face)  [] EVAL (HIGH) 35198 (typically 45 minutes face-to-face)  [] RE-EVAL   [x] TQ(05061) x   2   [] IONTO  [x] NMR (92805) x  1    [] VASO  [] Manual (51239) x      [] Other:  [] TA x       [] Mech Traction (83011)  [] ES(attended) (82941)      [] ES (un) (39330):     GOALS:   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 Independent in HEP and progression per patient tolerance, in order to prevent re-injury. [x] Patient able to complete treatment  [x] Patient limited by fatigue  [] Patient limited by pain     [] Patient limited by other medical complications  [x] Other: Good tolerance to program. Pt able to reach 130° with only 2 reps on ERMI. Cautioned pt to monitor activity level  and increase ice/rest as needed to control for swelling. Continues to lack terminal knee extension which affects gait and quad strength/ review of necessity for hanging weights. Pt very receptive to instructions and understands ecxpectations. PLAN:   [x] Continue per plan of care [] Alter current plan (see comments above)  [] Plan of care initiated [] Hold pending MD visit [] Discharge  Frequency/Duration:  2 days per week for 12 Weeks:  Interventions:    Check hanging weights tolerance (due to 15#)  SLR/ PRE progression  Gait training  Hip and core  Balance  Change frequency to PT 1x/wk for progressions  Updated objective measures        Electronically signed by:      Dell Pineda PTA    Note: If patient does not return for scheduled/ recommended follow up visits, this note will serve as a discharge from care along with most recent update on progress.

## 2021-01-07 ENCOUNTER — HOSPITAL ENCOUNTER (OUTPATIENT)
Dept: PHYSICAL THERAPY | Age: 81
Setting detail: THERAPIES SERIES
Discharge: HOME | End: 2021-01-07
Payer: MEDICARE

## 2021-01-07 PROCEDURE — 97112 NEUROMUSCULAR REEDUCATION: CPT | Performed by: PHYSICAL THERAPIST

## 2021-01-07 PROCEDURE — 97164 PT RE-EVAL EST PLAN CARE: CPT | Performed by: PHYSICAL THERAPIST

## 2021-01-07 PROCEDURE — 97530 THERAPEUTIC ACTIVITIES: CPT | Performed by: PHYSICAL THERAPIST

## 2021-01-07 PROCEDURE — 97110 THERAPEUTIC EXERCISES: CPT | Performed by: PHYSICAL THERAPIST

## 2021-01-07 NOTE — FLOWSHEET NOTE
whichever is less): 1/13/21         Visit # Insurance Allowable Auth Required   1 (4053)  19 (2020) MED NEC []  Yes [x]  No      OUTCOME MEASURE DATE DEFICIT   WOMAC 10/13/20 pre-op 43% Deficit   LEFS Score 10/16/20 (Postop) 94% Deficit   LEFS Score 11/16/20 37% Deficit  (50/ 80= 63%)   LEFS Score 12-28-20 29% deficit   (57/80=71%)        Patient given satisfaction survey? [] Yes   [x] No       Latex Allergy:  [x]NO      []YES  Preferred Language for Healthcare:   [x]English       []other:     Pain level:  0-2  / 10     SUBJECTIVE:   12.1 weeks post op. Notes reduced calf swelling related to Celebrex use; concerned about flexion limitation if swelling controlled. Is now up and on his legs full days/ no specific exercise except to swelling  Noticeable benefit from celebrex. Able to stand majority of day. Ascends/descends stairs without issue. Pleased with outcome of surgery. Happy with relief of pre op pain. Pre op standing endurance was 1.5 hours max with excruciating pain. Primary goal is pain management; not worried about going back to strenuous activity. .     OBJECTIVE:      1-7-21  ROM PROM AROM Overpressure Comment     L R L R L R     Flexion 125  135 140       Cold  ERMI   Extension  -4 0   -1       Propped  QS                                           1-7-21  Strength L R Comment   Quad 4/5 5/5  FLORENCIA 0°   Hamstring       Gastroc       Hip  flexion       Hip abd                                1-7-21  Special Test Results/Comment   Homans Negative   Temperature Negative         1-7-21  Girth L R   Calf 40.4 37.0   Mid Patella 44.0 41.0   Suprapatellar 44.2 41.5   5cm above 44.0 42.7   15 cm above 47.1 47.5       Reflexes/Sensation:               []?Dermatomes/Myotomes intact               []?Reflexes equal and normal bilaterally              [x]? Other: NT     Joint mobility:                []?Normal                       [x]? Hypo: decreased patellar mobility              []?Hyper     Palpation: Nontender     Functional Mobility/Transfers: Independent     Posture: PO neutral varus (partial MCL release during TKR); LLD WNL     Bandages/Dressings/Incisions: Healed    Gait: FWBAT; no assistance; WFL; improved gait pattern; mild flexed knee stance     Orthopedic Special Tests:  Valgus stress test (-) MCL is stable 10-20-20      TEST INITIAL  3 MOS PO FOLLOW  UP GOAL   SINGLE LEG SQUAT TEST L: 8\"; (-) valgus; requires fingertip pressure    R: 6\"; (-) valgus; requires fingertip pressure; (+) apprehension  NO KNEE VALGUS, MEDIAL/LATERAL MOVEMENT, OR PELVIC TILT  GOOD, FAIR, POOR   6 MINUTE WALK TEST 0.23 miles                      M             F          60-70 y/o: >.31mile,  >.30mile  66-78 y/o: >.28 mile, >.26 mile  80-79 y/o: >.21mile,  >.20 mile   STAIR CLIMBING TEST 12.56 sec  < 13 SEC (M&F)     Score Sheet for Y Balance Test  1-7-21   Left  Right  Difference  Comments    Anterior  69 cm 55 cm +14 cm    Posteromedial  117 cm 120 cm -3 cm    Posterolateral  109 cm 98 cm +11 cm           Leg Length       **Difference should be less than 4 cm for return to sport and preparticipation screening (<10% deficit compared to uninvolved)**  (Star Excursion Balance Test as a Predictor of Lower Extremity Injury in [employer] Players)         RESTRICTIONS/PRECAUTIONS: HTN (medicated);     Exercises/Interventions:  Exercise/Equipment Resistance/Repetitions Other comments 1/7/2021     Stretching        Hamstring 30\"x 5 Towel roll; 15# x   Hip Flexor      ITB      Figure 4      Quad      Inclined Calf 30\"x 5  x   Towel Pull 30\"x 5 Towel roll; 15# x            ROM        EOB Self-Assist      Sheet Pull      Wall Slide      Active-EOB 10x KEXT/ FLEX x   Biodex Passive      Recumbent Bike 8' S12; R 5.0- x   ERMI 10' 1' holds x2 x   Hang Weights 10' 15# x   Ankle Pumps 5'  HEP                  Patellar Glides  5' total      Medial       Superior       Inferior                Isometrics        Quad sets 10x10''  A/S; for symptom control. Review concept of in for 3 months for a process lasting ~12 months. Plan for 3 month check with Dr. Nitin Nash on Tuesday, with future appointments at 6 and 12 months PO.    HEP:  Patient instructed on HEP on this date with handout provided and all questions answered. Patient was instructed to contact PT with any complications or questions about HEP moving forward. Patient verbally stated she/he understood. Updated 10/13/20  [employer] CODE: [id number]; see attached for 10/16/20 update on paper  1-7-21: see attached for HEP updates    Therapeutic Exercise and NMR EXR  [x] (28532) Provided verbal/tactile cueing for activities related to strengthening, flexibility, endurance, ROM for improvements in LE, proximal hip, and core control with self care, mobility, lifting, ambulation. [x] (79927) Provided verbal/tactile cueing for activities related to improving balance, coordination, kinesthetic sense, posture, motor skill, proprioception  to assist with LE, proximal hip, and core control in self care, mobility, lifting, ambulation and eccentric single leg control.      NMR and Therapeutic Activities:    [x] (93132 or 59766) Provided verbal/tactile cueing for activities related to improving balance, coordination, kinesthetic sense, posture, motor skill, proprioception and motor activation to allow for proper function of core, proximal hip and LE with self care and ADLs  [x] (85293) Gait Re-education- Provided training and instruction to the patient for proper LE, core and proximal hip recruitment and positioning and eccentric body weight control with ambulation re-education including up and down stairs     Home Exercise Program:    [x] (02877) Reviewed/Progressed HEP activities related to strengthening, flexibility, endurance, ROM of core, proximal hip and LE for functional self-care, mobility, lifting and ambulation/stair navigation   [x] (01716)Reviewed/Progressed HEP activities related to improving balance, coordination, kinesthetic sense, posture, motor skill, proprioception of core, proximal hip and LE for self care, mobility, lifting, and ambulation/stair navigation      Manual Treatments:  PROM / STM / Oscillations-Mobs:  G-I, II, III, IV (PA's, Inf., Post.)  [x] (68784) Provided manual therapy to mobilize LE, proximal hip and/or LS spine soft tissue/joints for the purpose of modulating pain, promoting relaxation,  increasing ROM, reducing/eliminating soft tissue swelling/inflammation/restriction, improving soft tissue extensibility and allowing for proper ROM for normal function with self care, mobility, lifting and ambulation. Modalities: ice to go  [] hot packs  [] EMS    [] Ultrasound  [] ice CP  [] vasopneumatic: L2; 10'  [] high volt/EGS  [] phono  [] tens     [] ionto  [] autorange/biodex [] Interferential   [] other      Charges:  Timed Code Treatment Minutes: 50'   Total Treatment Minutes: 48'       [] EVAL (LOW) 455 1011 (typically 20 minutes face-to-face)  [] EVAL (MOD) 05912 (typically 30 minutes face-to-face)  [] EVAL (HIGH) 09732 (typically 45 minutes face-to-face)  [x] RE-EVAL   [x] OP(39207) x   1   [] IONTO  [x] NMR (32513) x  1    [] VASO  [] Manual (10494) x      [] Other:  [x] TA x 1      [] Mech Traction (54666)  [] ES(attended) (29699)      [] ES (un) (00147):     GOALS:   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 Independent in HEP and progression per patient tolerance, in order to prevent re-injury. [x] Progressing: [] Met: [x] Not Met: [] Adjusted   2. Patient will have a decrease in pain to facilitate improvement in movement, function, and ADLs as indicated by Functional Deficits. [x] Progressing: [] Met: [x] Not Met: [] Adjusted    Long Term Goals: To be achieved in: 12 weeks  PO  1.  Disability index score of 20% or less for the LEFS to assist with reaching prior level of function. [x] Progressing: [] Met: [x] Not Met: [] Adjusted  2. Patient will demonstrate increased AROM to 0-125 or greater to allow for proper joint functioning as indicated by patients Functional Deficits. [x] Progressing: [] Met: [x] Not Met: [] Adjusted  3. Patient will demonstrate an increase in Strength to 5/5 in BLE to allow for proper functional mobility as indicated by patients Functional Deficits. [x] Progressing: [] Met: [x] Not Met: [] Adjusted  4. Patient will return to ADL and other functional activities without increased symptoms or restriction. [x] Progressing: [] Met: [x] Not Met: [] Adjusted  5. Patient will be able to ascend/descend 10 stairs without pain in left knee. (patient specific functional goal)    [x] Progressing: [] Met: [x] Not Met: [] Adjusted    Overall Progression Towards Functional goals/ Treatment Progress Update:  [x] Patient is progressing as expected towards functional goals listed. [] Progression is slowed due to complexities/Impairments listed. [x] Progression has been slowed due to co-morbidities. [] Plan just implemented, too soon to assess goals progression <30days   [] Goals require adjustment due to lack of progress  [] Patient is not progressing as expected and requires additional follow up with physician  [x] Other: Significant preop varus and lack of full knee extension    Prognosis for POC: [x] Good [] Fair  [] Poor    Patient requires continued skilled intervention: [x] Yes  [] No    Treatment/Activity Tolerance:  [x] Patient able to complete treatment  [x] Patient limited by fatigue  [] Patient limited by pain     [] Patient limited by other medical complications  [x] Other: Good tolerance to program. Pt able to reach 130° with only 2 reps on ERMI. Cautioned pt to monitor activity level  and increase ice/rest as needed to control for swelling.  Continues to lack terminal knee extension which affects gait and quad strength/ review of necessity for hanging weights. Pt very receptive to instructions and understands ecxpectations. PLAN:   [x] Continue per plan of care [] Alter current plan (see comments above)  [] Plan of care initiated [] Hold pending MD visit [] Discharge  Frequency/Duration:  2 days per week for 12 Weeks:  Interventions:    Check hanging weights tolerance (due to 15#)  SLR/ PRE progression  Gait training  Hip and core  Balance  Change frequency to PT 1x/wk for progressions-> FU @ 6 mos PO  3 mos MD check at [facility]        Electronically signed by:      Charis Kawasaki, PT    Note: If patient does not return for scheduled/ recommended follow up visits, this note will serve as a discharge from care along with most recent update on progress.

## 2021-01-12 ENCOUNTER — HOSPITAL ENCOUNTER (OUTPATIENT)
Dept: PHYSICAL THERAPY | Age: 81
Setting detail: THERAPIES SERIES
Discharge: HOME | End: 2021-01-12
Payer: MEDICARE

## 2021-01-12 PROCEDURE — 97530 THERAPEUTIC ACTIVITIES: CPT | Performed by: PHYSICAL THERAPY ASSISTANT

## 2021-01-12 PROCEDURE — 97110 THERAPEUTIC EXERCISES: CPT | Performed by: PHYSICAL THERAPY ASSISTANT

## 2021-01-12 PROCEDURE — 97112 NEUROMUSCULAR REEDUCATION: CPT | Performed by: PHYSICAL THERAPY ASSISTANT

## 2021-01-12 NOTE — FLOWSHEET NOTE
[facility] [address] [place] [address] [facility]      [facility] Therapy Daily Treatment Note  Date:  2021    Patient Name:  Ml Garcia    :  1940  MRN: [id number]  Restrictions/Precautions:    Medical/Treatment Diagnosis Information:  · Diagnosis: M17.12 (ICD-10-CM) - Primary osteoarthritis of left knee  · Treatment Diagnosis: M25.562 L Knee Pain; R53.1 Weakness  · S/PLeft TKR, partial MCL release  · DOS: 10/14/20  · Meds: Celebrex 1 tab BID (30 days) ; ASA 1 tab BID  Insurance/Certification information:  PT Insurance Information: PT BENEFITS / AET[employer]/ EFFECTIVE 20/ ACTIVE/  / PAYS 96%/ OOP 1500 . 95/ NO VISIT LIMIT MED NEC/ 0 AUTH/ GRABIEL PATRICIA. REF# [id number]/ BENEFIT FAX IN MEDIA/ 10-7-20 PAG  Physician Information:  Referring Practitioner: Alba Rosas  Has the plan of care been signed (Y/N):        [x]  Yes  []  No     Date of Patient follow up with Physician:  6 mos PO PO   Patient seen in consultation with Dr. Alba Rosas who established the initial/subsequent treatment protocol. 10-20-20: MCL is stable, given medrol dose pack for swelling, ok standard protocol, avoid falling to protect MCL--MD states it is slightly weaker x2-3 weeks after partial release  11-10-20: 3-4 week PO check upstairs today; overall doing well, with knee stiffness into flexion as primary complaint; PO x-rays today; ROM OP program for both flexion and extension; concerned about swelling-> suggested using the URI hose; states LLD WNL; but awareness of lack of L knee full extension; placed on Celebrex x 4 weeks  12-10-20: doing well, discussion re: anti-inflammatories, over use  21: doing well, cycle celebrex to protect liver--2 wks on/1-2weeks off OR taking it QD vs BID--pt can decide pending symptoms, risks vs benefit.     Is this a Progress Report:     []  Yes  [x]  No        If Yes:  Date Range for reporting period:  Beginnin20  Endin20    Progress report will be due (10 Rx or 30 days whichever is less): 7/53/86       Recertification will be due (POC Duration  / 90 days whichever is less): 1/13/21         Visit # Insurance Allowable Auth Required   2(2021)  19 (0277) MED NEC []  Yes [x]  No      OUTCOME MEASURE DATE DEFICIT   WOMAC 10/13/20 pre-op 43% Deficit   LEFS Score 10/16/20 (Postop) 94% Deficit   LEFS Score 11/16/20 37% Deficit  (50/ 80= 63%)   LEFS Score 12-28-20 29% deficit   (57/80=71%)        Patient given satisfaction survey? [] Yes   [x] No       Latex Allergy:  [x]NO      []YES  Preferred Language for Healthcare:   [x]English       []other:     Pain level:  0-2  / 10     SUBJECTIVE:   3 months post op. Notes reduced calf swelling related to Celebrex use; concerned about flexion limitation if swelling uncontrolled. Noticeable benefit from celebrex. Able to stand majority of day. Ascends/descends stairs without issue. Pleased with outcome of surgery. Happy with relief of pre op pain. Pre op standing endurance was 1.5 hours max with excruciating pain. Primary goal is pain management; not worried about going back to strenuous activity. .     OBJECTIVE:      1-7-21  ROM PROM AROM Overpressure Comment     L R L R L R     Flexion 125  135 140       Cold  ERMI   Extension  -4 0   -1       Propped  QS                                           1-7-21  Strength L R Comment   Quad 4/5 5/5  FLORENCIA 0°   Hamstring       Gastroc       Hip  flexion       Hip abd                                1-12-21  Special Test Results/Comment   Homans Negative   Temperature Negative         1-7-21  Girth L R   Calf 40.4 37.0   Mid Patella 44.0 41.0   Suprapatellar 44.2 41.5   5cm above 44.0 42.7   15 cm above 47.1 47.5       Reflexes/Sensation:               []?Dermatomes/Myotomes intact               []?Reflexes equal and normal bilaterally              [x]? Other: NT     Joint mobility:                []?Normal                       [x]? Hypo: decreased patellar mobility []?Hyper     Palpation: Nontender     Functional Mobility/Transfers: Independent     Posture: PO neutral varus (partial MCL release during TKR); LLD WNL     Bandages/Dressings/Incisions: Healed    Gait: FWBAT; no assistance; WFL; improved gait pattern; mild flexed knee stance     Orthopedic Special Tests:  Valgus stress test (-) MCL is stable 10-20-20      TEST INITIAL  1-7-21  3 MOS PO FOLLOW  UP GOAL   SINGLE LEG SQUAT TEST L: 8\"; (-) valgus; requires fingertip pressure    R: 6\"; (-) valgus; requires fingertip pressure; (+) apprehension  NO KNEE VALGUS, MEDIAL/LATERAL MOVEMENT, OR PELVIC TILT  GOOD, FAIR, POOR   6 MINUTE WALK TEST 0.23 miles                      M             F          60-70 y/o: >.31mile,  >.30mile  66-78 y/o: >.28 mile, >.26 mile  80-81 y/o: >.21mile,  >.20 mile   STAIR CLIMBING TEST 12.56 sec  < 13 SEC (M&F)     Score Sheet for Y Balance Test  1-7-21   Left  Right  Difference  Comments    Anterior  69 cm 55 cm +14 cm    Posteromedial  117 cm 120 cm -3 cm    Posterolateral  109 cm 98 cm +11 cm           Leg Length       **Difference should be less than 4 cm for return to sport and preparticipation screening (<10% deficit compared to uninvolved)**  (Star Excursion Balance Test as a Predictor of Lower Extremity Injury in [employer] Players)         RESTRICTIONS/PRECAUTIONS: HTN (medicated);     Exercises/Interventions:  Exercise/Equipment Resistance/Repetitions Other comments 1/12/2021     Stretching        Hamstring 30\"x 5 Towel roll; 15# x   Hip Flexor      ITB      Figure 4      Quad      Inclined Calf 30\"x 5  x   Towel Pull 30\"x 5 Towel roll; 15# x            ROM        EOB Self-Assist      Sheet Pull      Wall Slide      Active-EOB 10x KEXT/ FLEX x   Biodex Passive      Recumbent Bike 8' S12; R 5.0- x   ERMI 10' 1' holds x2    Wynonia Skains Weights 10' 15#    Ankle Pumps 5'  HEP                  Patellar Glides  5' total      Medial       Superior       Inferior Isometrics        Quad sets 10x10''  A/S; 15# x   Add sets 10\"x 10 PS: extended hep            SLR        Supine 3x10  hep   Prone      Abduction 3x10  hep   Adducton 2x 10\"x 10 PS hep   SLR+                 Glutes        Bridges 3x10  x   Supine Clams 3x10 Gray band x   S/L Clams 3x10 Gray band x   Side Stepping        Monster walks                 CKC        Weight Shift      Calf raises 3x10       Wall sits      Step ups 3x Flight of stairs NPV   Squatting      CC TKE 3x10 7pl *   SL DL                 PRE        Extension 3x 10 RANGE: 30-0; 30# DL x   Flexion 3x 10 (S .5 hole; L3) RANGE: 0-90; 50# DL x   Leg Press 3x10 (S1 hole; flat w/ pillow) 80-10, 110# x   Cable Column                 Balance        Tandem Stance 30\"x 3 Tandem on aero beam; TM x   Tilt board        SLS       Biodex balance  4' RC; L8; LC             Other        Treadmill  5' Gait; 2.5 mph; Focus on stride length; heel strike; toe off; stance symmetry; swing flexion    Gait Training 3' Marching; L and R to focus on stance symmetry and adequate H/K FLEX    Lateral walking  3x 50' Blue x               Patient have access to gym? [] Yes  [] No will not use public facility due to COVID 19  Patient have equipment at home? [x] Yes  [] No Knee ext, bike, TM    Patient Education:  10/13/20: Educated patient on all pre-op and post-op instructions including but not limited to R.I.C.E., post-op HEP, DVT prevention, warning signs of infection and DVT, and on activity limitations.   10/16/20: Patient education regarding signs/ symptoms for MD phone call vs ER/ instructions for HEP/ instructions for DJO ICEMAN  10/27/20: Discussion regarding ROM OP progression  11/12/20: See attached for instructions regarding ROM OP for both FLEX/ EXT; discussion regarding rationale/ procedure of a a Doppler study  1-7-21: Extensive discussion regarding HEP progression to include warm up/ ROM/ stretching; strength program (BW; AW; EB); NI cardio (bike) for HEP compliance and program progression. Review knee homeostasis for symptom control. Review concept of in for 3 months for a process lasting ~12 months. Plan for 3 month check with Dr. Maureen Tabares on Tuesday, with future appointments at 6 and 12 months PO.    HEP:  Patient instructed on HEP on this date with handout provided and all questions answered. Patient was instructed to contact PT with any complications or questions about HEP moving forward. Patient verbally stated she/he understood. Updated 10/13/20  PerBlue CODE: [id number]; see attached for 10/16/20 update on paper  1-7-21: see attached for HEP updates    Therapeutic Exercise and NMR EXR  [x] (67866) Provided verbal/tactile cueing for activities related to strengthening, flexibility, endurance, ROM for improvements in LE, proximal hip, and core control with self care, mobility, lifting, ambulation. [x] (60016) Provided verbal/tactile cueing for activities related to improving balance, coordination, kinesthetic sense, posture, motor skill, proprioception  to assist with LE, proximal hip, and core control in self care, mobility, lifting, ambulation and eccentric single leg control.      NMR and Therapeutic Activities:    [x] (91579 or 30937) Provided verbal/tactile cueing for activities related to improving balance, coordination, kinesthetic sense, posture, motor skill, proprioception and motor activation to allow for proper function of core, proximal hip and LE with self care and ADLs  [x] (07415) Gait Re-education- Provided training and instruction to the patient for proper LE, core and proximal hip recruitment and positioning and eccentric body weight control with ambulation re-education including up and down stairs     Home Exercise Program:    [x] (76238) Reviewed/Progressed HEP activities related to strengthening, flexibility, endurance, ROM of core, proximal hip and LE for functional self-care, mobility, lifting and ambulation/stair navigation   [x] achieved in: 12 weeks  PO  1. Disability index score of 20% or less for the LEFS to assist with reaching prior level of function. [x] Progressing: [] Met: [x] Not Met: [] Adjusted  2. Patient will demonstrate increased AROM to 0-125 or greater to allow for proper joint functioning as indicated by patients Functional Deficits. [x] Progressing: [] Met: [x] Not Met: [] Adjusted  3. Patient will demonstrate an increase in Strength to 5/5 in BLE to allow for proper functional mobility as indicated by patients Functional Deficits. [x] Progressing: [] Met: [x] Not Met: [] Adjusted  4. Patient will return to ADL and other functional activities without increased symptoms or restriction. [x] Progressing: [] Met: [x] Not Met: [] Adjusted  5. Patient will be able to ascend/descend 10 stairs without pain in left knee. (patient specific functional goal)    [x] Progressing: [] Met: [x] Not Met: [] Adjusted    Overall Progression Towards Functional goals/ Treatment Progress Update:  [x] Patient is progressing as expected towards functional goals listed. [] Progression is slowed due to complexities/Impairments listed. [x] Progression has been slowed due to co-morbidities. [] Plan just implemented, too soon to assess goals progression <30days   [] Goals require adjustment due to lack of progress  [] Patient is not progressing as expected and requires additional follow up with physician  [x] Other: Significant preop varus and lack of full knee extension    Prognosis for POC: [x] Good [] Fair  [] Poor    Patient requires continued skilled intervention: [x] Yes  [] No    Treatment/Activity Tolerance:  [x] Patient able to complete treatment  [x] Patient limited by fatigue  [] Patient limited by pain     [] Patient limited by other medical complications  [x] Other: Good tolerance to program.  Cautioned pt to monitor activity level  and increase ice/rest as needed to control for swelling.   Pt very receptive to instructions and understands ecxpectations. Pt has good understanding of program and will continue independently. Follow up at 6 and 12 months post op for re-evaluation and program progressions. PLAN:   [x] Continue per plan of care [] Alter current plan (see comments above)  [] Plan of care initiated [] Hold pending MD visit [] Discharge  Frequency/Duration:  2 days per week for 12 Weeks:  Interventions:    FU @ 6 mos PO MD/PT- repeat functional tests      Electronically signed by:      Laura Solomon PTA    Note: If patient does not return for scheduled/ recommended follow up visits, this note will serve as a discharge from care along with most recent update on progress.

## 2021-05-25 ENCOUNTER — OFFICE VISIT (OUTPATIENT)
Dept: ORTHOPEDIC SURGERY | Age: 81
End: 2021-05-25
Payer: MEDICARE

## 2021-05-25 VITALS — WEIGHT: 198 LBS | BODY MASS INDEX: 26.82 KG/M2 | HEIGHT: 72 IN

## 2021-05-25 DIAGNOSIS — Z96.652 STATUS POST TOTAL LEFT KNEE REPLACEMENT: ICD-10-CM

## 2021-05-25 DIAGNOSIS — M25.561 RIGHT KNEE PAIN, UNSPECIFIED CHRONICITY: Primary | ICD-10-CM

## 2021-05-25 DIAGNOSIS — M17.11 PRIMARY OSTEOARTHRITIS OF RIGHT KNEE: ICD-10-CM

## 2021-05-25 PROCEDURE — 99213 OFFICE O/P EST LOW 20 MIN: CPT | Performed by: ORTHOPAEDIC SURGERY

## 2021-05-25 RX ORDER — DICLOFENAC SODIUM 75 MG/1
50 TABLET, DELAYED RELEASE ORAL 2 TIMES DAILY WITH MEALS
Qty: 40 TABLET | Refills: 0 | Status: SHIPPED | OUTPATIENT
Start: 2021-05-25

## 2021-05-25 NOTE — PROGRESS NOTES
Review of Systems   Cardiovascular:        High blood pressure   All other systems reviewed and are negative.

## 2021-05-25 NOTE — PROGRESS NOTES
Chief Complaint    Follow-up (OPNP Right knee )      History of Present Illness:  Joy Patten is a 68 y.o. male who presents for follow up of bilateral knee(s). The patient is here for 7 months follow up from their left TKA on 10/14/2020. The patient is doing very well over all. They are managing their pain with medication, ice and rest. They report their range of motion to be 0 to 130 without difficulty. L knee is doing well overall. Pt also reports R knee pain that has increased the past couple months with stairs and extended walking. Pain Assessment:  Location: right  Level: 6 out of 10  Duration: Weeks  Description: sharp  Result of an injury: Yes  Work related injury: No  Aggravating actions: stairs  Relieving Factors: ice  Wants injections: Yes     Past Medical History:   Diagnosis Date    Hypertension     Prolonged emergence from general anesthesia         Past Surgical History:   Procedure Laterality Date    COLONOSCOPY      COLONOSCOPY  10/24/2013    JOINT REPLACEMENT      TOTAL KNEE ARTHROPLASTY Left 10/14/2020    LEFT TOTAL KNEE REPLACEMENT performed by Breann Ring MD at [facility] OR       No family history on file. Social History     Socioeconomic History    Marital status:      Spouse name: Not on file    Number of children: Not on file    Years of education: Not on file    Highest education level: Not on file   Occupational History    Not on file   Tobacco Use    Smoking status: Never Smoker    Smokeless tobacco: Never Used   Substance and Sexual Activity    Alcohol use:  Yes     Alcohol/week: 3.0 standard drinks     Types: 3 Glasses of wine per week    Drug use: Never    Sexual activity: Not on file   Other Topics Concern    Not on file   Social History Narrative    Not on file     Social Determinants of Health     Financial Resource Strain:     Difficulty of Paying Living Expenses:    Food Insecurity:     Worried About Running Out of Food in the Last Year:     Ran Out of Food in the Last Year:    Transportation Needs:     Lack of Transportation (Medical):  Lack of Transportation (Non-Medical):    Physical Activity:     Days of Exercise per Week:     Minutes of Exercise per Session:    Stress:     Feeling of Stress :    Social Connections:     Frequency of Communication with Friends and Family:     Frequency of Social Gatherings with Friends and Family:     Attends Hoahaoism Services:     Active Member of Clubs or Organizations:     Attends Club or Organization Meetings:     Marital Status:    Intimate Partner Violence:     Fear of Current or Ex-Partner:     Emotionally Abused:     Physically Abused:     Sexually Abused:        Current Outpatient Medications   Medication Sig Dispense Refill    celecoxib (CELEBREX) 100 MG capsule Take 1 capsule by mouth 2 times daily 60 capsule 1    docusate sodium (COLACE) 100 MG capsule Take 1 capsule by mouth 2 times daily 30 capsule 0    senna-docusate (PERICOLACE) 8.6-50 MG per tablet Take 2 tablets by mouth daily as needed for Constipation 30 tablet 0    ondansetron (ZOFRAN) 4 MG tablet Take 1 tablet by mouth every 8 hours as needed for Nausea or Vomiting 30 tablet 0    benazepril (LOTENSIN) 10 MG tablet Take 10 mg by mouth daily.  amLODIPine (NORVASC) 10 MG tablet Take 10 mg by mouth daily. No current facility-administered medications for this visit. No Known Allergies    Review of Systems:  A 14 point review of systems was completed by the patient and is available in the media section of the scanned medical record and was reviewed on 5/25/2021. The review is negative with the exception of those things mentioned in the HPI and Past Medical History     Vital Signs:   Ht 6' (1.829 m)   Wt 198 lb (89.8 kg)   BMI 26.85 kg/m²     General Exam:   Mental Status: The patient is oriented to time, place and person. The patient's mood and affect are appropriate.     Neurological: The patient has good coordination. There is no weakness or sensory deficit. Knee Examination: Bilateral    Skin:  There are no skin lesions, cellulitis, or extreme edema in the lower extremities. Sensation is grossly intact to light touch bilaterally lower extremity. The patient has warm and well-perfused Bilateral lower extremities with brisk capillary refill. Inspection:  The is moderate edema, no gross deformities, and no signs of infection. Surgical incision is healing well with no signs of infection. Palpation: There is patellofemoral crepitus, there is no significant tenderness over the knee consistent with their post operative status. R knee has pain over the MJL. Range of Motion:  WNL  and grossly intact without pain and/or difficulty    Strength: Motor is grossly intact    Special Tests: There is no ligament instability. Grinding/Crepitus (+) R Knee     Gait: non antalgic  without the use of assistive devices    Alignment: neutral    Radiology:     Clinton Aparicio x-rays  of his right knee taken 5/25/2021 showed satisfactory healing with no evidence of acute fracture or loosening. None      Office Procedures:  Orders Placed This Encounter   Procedures    XR KNEE RIGHT (3 VIEWS)     Standing Status:   Future     Number of Occurrences:   1     Standing Expiration Date:   5/25/2022            Assessment: Clinton Aparicio is a 68 y.o. male who presents for follow up of bilateral knee(s). Status PO L TKA 7 months is doing well overall. He has some moderate swelling still which we discussed him reducing his activity overall. We are also going to do the Dry-Up program starting with Voltaren alternating taking 1 week, off 1 week x 3 weeks with also a reduction in daily activities. Next step for the L knee would be an aspiration and injection. Encounter Diagnosis   Name Primary?  Right knee pain, unspecified chronicity Yes       Patient's workup and evaluation were reviewed with the patient in the office today. Imaging was also reviewed with the patient in the clinic today. Given the patient's history and physical exam the patient is healing appropriately on the post operative course. They should continue to work closely with physical therapy to progress their range of motion and strength gains. We will also do a cortizone injection in that right knee to help reduce the inflammation and hopefully put off a TKA. After informed consent was provided, the patient was seated on the exam table with the right knee(s) flexed to 90 degrees. The anterolateral aspect of the knee adjacent to the joint line was prepped with Chlora-prep. The skin and subcutaneous tissues were anesthetized with ethyl chloride spray. A 20-gauge needle was inserted into the right knee(s) and 2 ml of 40 mg/ml DepoMedrol was injected. The needle was withdrawn and the puncture site sealed with a Band-Aid. The patient tolerated the procedure well. Post injection instructions given and any problems to notify us. Treatment Plan:    1. Activity modification and rest  2. Ice 20 minutes every 1-2 hours PRN  3. NSAIDs PRN  4. Elevation at least 2 inches above the heart with pillows supporting the joint underneath for swelling  5. Home exercises to maintain strength and range of motion      Diagnoses and treatments were reviewed with the patient today. Patient education material was provided. Questions were entertained and answered to the patient's satisfaction. Follow up: JUAN A PARISI, Sam Austin ATC am scribing for and in the presence of Dr. Brennan Chowdhury. The physical examination was performed by Dr. Brennan Chowdhury. All counseling during the appointment was performed between the patient and Dr. Brennan Chowdhury. 05/25/21 10:21 AM   Shannan Garzon MS, ATC    This patient exhibits moderate complexity for obtaining an independent history, reviewing past medical records, independent interpretation of diagnostic imaging, and further coordinating care. The patient exhibits moderate risk. Approximately 15 minutes were spent reviewing past medical records, imaging, educating the patient, and coordinating care. This note was created using voice recognition software. It has been proofread, but occasionally errors remain. Please disregard these errors. They will be corrected as they are noted. This dictation was performed with a verbal recognition program (DRAGON) and it was checked for errors. It is possible that there are still dictated errors within this office note. If so, please bring any errors to my attention for an addendum. All efforts were made to ensure that this office note is accurate. Supervising Physician Attestation:  I, Dr. Gala Uribe, personally performed the services described in this documentation as scribed above, and it is both accurate and complete and I agree with all pertinent clinical information. I personally interviewed the patient and performed a physical examination and medical decision making. I discussed the patient's condition and treatment options and have  reviewed and agree with the past medical, family and social history unless otherwise noted. All of the patient's questions were answered.       Board Certified Orthopaedic Surgeon  [address] [facility] and [address] and [address] and [employer]  Professor of Yahaira W Madiha Stoddard

## 2021-07-26 ENCOUNTER — TELEPHONE (OUTPATIENT)
Dept: ORTHOPEDIC SURGERY | Age: 81
End: 2021-07-26

## 2021-07-26 NOTE — TELEPHONE ENCOUNTER
Patient going to Dentist called to [facility] [phone] 2 grams of Amoxicillin 1 hour before procedure.  No allergies

## 2021-09-09 ENCOUNTER — OFFICE VISIT (OUTPATIENT)
Dept: ORTHOPEDIC SURGERY | Age: 81
End: 2021-09-09
Payer: MEDICARE

## 2021-09-09 VITALS — BODY MASS INDEX: 26.82 KG/M2 | HEIGHT: 72 IN | WEIGHT: 198 LBS

## 2021-09-09 DIAGNOSIS — Z96.652 STATUS POST TOTAL LEFT KNEE REPLACEMENT: Primary | ICD-10-CM

## 2021-09-09 DIAGNOSIS — M17.11 PRIMARY OSTEOARTHRITIS OF RIGHT KNEE: ICD-10-CM

## 2021-09-09 PROCEDURE — 99213 OFFICE O/P EST LOW 20 MIN: CPT | Performed by: ORTHOPAEDIC SURGERY

## 2021-09-09 NOTE — PROGRESS NOTES
Chief Complaint    Follow-up (Left knee)      History of Present Illness:  Estela Valera is a 80 y.o. male who presents for follow up of left knee(s). The patient is here for 11 months follow up from their left total knee arthroplasty on 10/14/2020. The patient is doing very well over all. They are managing their pain with ice and rest. They report their range of motion to be WNL with no difficulty. Pt reports doing well overall with no complaints of pain. He does state some difficulty with going up/down stairs and some occasional swelling, but is overall happy with his knee today. Pain Assessment:  Location: bilateral, left  Level: 0 out of 10  Duration: None  Description: dull  Result of an injury: Yes  Work related injury: No  Aggravating actions: Stairs  Relieving Factors: rest  Wants injections: No     Past Medical History:   Diagnosis Date    Hypertension     Prolonged emergence from general anesthesia         Past Surgical History:   Procedure Laterality Date    COLONOSCOPY      COLONOSCOPY  10/24/2013    JOINT REPLACEMENT      TOTAL KNEE ARTHROPLASTY Left 10/14/2020    LEFT TOTAL KNEE REPLACEMENT performed by Christina Billings MD at [facility] OR       No family history on file. Social History     Socioeconomic History    Marital status:      Spouse name: None    Number of children: None    Years of education: None    Highest education level: None   Occupational History    None   Tobacco Use    Smoking status: Never Smoker    Smokeless tobacco: Never Used   Substance and Sexual Activity    Alcohol use:  Yes     Alcohol/week: 3.0 standard drinks     Types: 3 Glasses of wine per week    Drug use: Never    Sexual activity: None   Other Topics Concern    None   Social History Narrative    None     Social Determinants of Health     Financial Resource Strain:     Difficulty of Paying Living Expenses:    Food Insecurity:     Worried About Running Out of Food in the Last Year:     Ran Out of Food in the Last Year:    Transportation Needs:     Lack of Transportation (Medical):  Lack of Transportation (Non-Medical):    Physical Activity:     Days of Exercise per Week:     Minutes of Exercise per Session:    Stress:     Feeling of Stress :    Social Connections:     Frequency of Communication with Friends and Family:     Frequency of Social Gatherings with Friends and Family:     Attends Synagogue Services:     Active Member of Clubs or Organizations:     Attends Club or Organization Meetings:     Marital Status:    Intimate Partner Violence:     Fear of Current or Ex-Partner:     Emotionally Abused:     Physically Abused:     Sexually Abused:        Current Outpatient Medications   Medication Sig Dispense Refill    diclofenac (VOLTAREN) 75 MG EC tablet Take 1 tablet by mouth 2 times daily (with meals) 40 tablet 0    celecoxib (CELEBREX) 100 MG capsule Take 1 capsule by mouth 2 times daily 60 capsule 1    docusate sodium (COLACE) 100 MG capsule Take 1 capsule by mouth 2 times daily 30 capsule 0    senna-docusate (PERICOLACE) 8.6-50 MG per tablet Take 2 tablets by mouth daily as needed for Constipation 30 tablet 0    ondansetron (ZOFRAN) 4 MG tablet Take 1 tablet by mouth every 8 hours as needed for Nausea or Vomiting 30 tablet 0    benazepril (LOTENSIN) 10 MG tablet Take 10 mg by mouth daily.  amLODIPine (NORVASC) 10 MG tablet Take 10 mg by mouth daily. No current facility-administered medications for this visit. No Known Allergies    Review of Systems:  A 14 point review of systems was completed by the patient and is available in the media section of the scanned medical record and was reviewed on 9/9/2021. The review is negative with the exception of those things mentioned in the HPI and Past Medical History     Vital Signs:   Ht 6' (1.829 m)   Wt 198 lb (89.8 kg)   BMI 26.85 kg/m²     General Exam:   Mental Status:  The patient is oriented to time, place and person. The patient's mood and affect are appropriate. Neurological: The patient has good coordination. There is no weakness or sensory deficit. Knee Examination: Left    Skin:  There are no skin lesions, cellulitis, or extreme edema in the lower extremities. Sensation is grossly intact to light touch bilaterally lower extremity. The patient has warm and well-perfused Bilateral lower extremities with brisk capillary refill. Inspection:  The is mild edema, no gross deformities, and no signs of infection. Surgical incision is healing well with no signs of infection. Palpation: There is no patellofemoral crepitus, there is no significant tenderness over the knee consistent with their post operative status. Range of Motion:  WNL  and grossly intact without pain and/or difficulty    Strength: Motor is grossly intact    Special Tests: There is no ligament instability. Valgus/Varus Stress (-)    Gait: non antalgic  without the use of assistive devices    Alignment: neutral    Radiology:     None      Office Procedures:  No orders of the defined types were placed in this encounter. Assessment: Jesenia Talley is a 80 y.o. male who presents for follow up of left knee(s). 11 Months Post Op L TKA    Patient's workup and evaluation were reviewed with the patient in the office today. Imaging was also reviewed with the patient in the clinic today. Given the patient's history and physical exam the patient is healing appropriately on the post operative course. We are overall happy with the patients progress. We discussed the occasional use of Aleve or Advil when he has some swelling, also icing for 15-20 minutes to help alleviate any extra swelling. Pt is in agreement with this plan. Treatment Plan:    1. Activity modification and rest  2.  Ice 20 minutes every 1-2 hours PRN  3. NSAIDs PRN  4. Elevation at least 2 inches above the heart with pillows supporting the joint underneath for swelling  5. Home exercises to maintain strength and range of motion  6. We will continue to treat his R knee conservatively, cortisone in the future if needed. Diagnoses and treatments were reviewed with the patient today. Patient education material was provided. Questions were entertained and answered to the patient's satisfaction. Follow up: Willian ARGUETA, ATC am scribing for and in the presence of Dr. Yane Manning. The physical examination was performed by Dr. Yane Manning. All counseling during the appointment was performed between the patient and Dr. Yane Manning. 09/09/21 4:07 PM   Shannan Gonzalez MS, ATC    This patient exhibits moderate complexity for obtaining an independent history, reviewing past medical records, independent interpretation of diagnostic imaging, and further coordinating care. The patient exhibits moderate risk. Approximately 18 minutes were spent reviewing past medical records, imaging, educating the patient, and coordinating care. This dictation was performed with a verbal recognition program (DRAGON) and it was checked for errors. It is possible that there are still dictated errors within this office note. If so, please bring any errors to my attention for an addendum. All efforts were made to ensure that this office note is accurate. Supervising Physician Attestation:  I, Dr. Yane Manning, personally performed the services described in this documentation as scribed above, and it is both accurate and complete and I agree with all pertinent clinical information. I personally interviewed the patient and performed a physical examination and medical decision making. I discussed the patient's condition and treatment options and have  reviewed and agree with the past medical, family and social history unless otherwise noted. All of the patient's questions were answered.       Board Certified Orthopaedic Surgeon  [facility] [address] [facility] and Medical Director  [employer]  Professor of Madiha Leger                                                                                                                                                            This note was created using voice recognition software. It has been proofread, but occasionally errors remain. Please disregard these errors. They will be corrected as they are noted.

## 2022-09-21 ENCOUNTER — HOSPITAL ENCOUNTER (EMERGENCY)
Age: 82
Discharge: HOME | End: 2022-09-21
Attending: EMERGENCY MEDICINE
Payer: MEDICARE

## 2022-09-21 ENCOUNTER — APPOINTMENT (OUTPATIENT)
Dept: GENERAL RADIOLOGY | Age: 82
End: 2022-09-21
Payer: MEDICARE

## 2022-09-21 VITALS
HEIGHT: 72 IN | DIASTOLIC BLOOD PRESSURE: 74 MMHG | BODY MASS INDEX: 26.14 KG/M2 | OXYGEN SATURATION: 95 % | HEART RATE: 56 BPM | WEIGHT: 193 LBS | TEMPERATURE: 98.1 F | RESPIRATION RATE: 17 BRPM | SYSTOLIC BLOOD PRESSURE: 142 MMHG

## 2022-09-21 DIAGNOSIS — R07.9 CHEST PAIN, UNSPECIFIED TYPE: Primary | ICD-10-CM

## 2022-09-21 LAB
A/G RATIO: 1.2 (ref 1.1–2.2)
ALBUMIN SERPL-MCNC: 4.3 G/DL (ref 3.4–5)
ALP BLD-CCNC: 64 U/L (ref 40–129)
ALT SERPL-CCNC: 20 U/L (ref 10–40)
ANION GAP SERPL CALCULATED.3IONS-SCNC: 11 MMOL/L (ref 3–16)
AST SERPL-CCNC: 23 U/L (ref 15–37)
BASOPHILS ABSOLUTE: 0.1 K/UL (ref 0–0.2)
BASOPHILS RELATIVE PERCENT: 0.7 %
BILIRUB SERPL-MCNC: 0.4 MG/DL (ref 0–1)
BUN BLDV-MCNC: 16 MG/DL (ref 7–20)
CALCIUM SERPL-MCNC: 9.4 MG/DL (ref 8.3–10.6)
CHLORIDE BLD-SCNC: 101 MMOL/L (ref 99–110)
CO2: 22 MMOL/L (ref 21–32)
CREAT SERPL-MCNC: 0.9 MG/DL (ref 0.8–1.3)
EOSINOPHILS ABSOLUTE: 0.2 K/UL (ref 0–0.6)
EOSINOPHILS RELATIVE PERCENT: 2 %
GFR AFRICAN AMERICAN: >60
GFR NON-AFRICAN AMERICAN: >60
GLUCOSE BLD-MCNC: 102 MG/DL (ref 70–99)
HCT VFR BLD CALC: 44.7 % (ref 40.5–52.5)
HEMOGLOBIN: 15.1 G/DL (ref 13.5–17.5)
LIPASE: 32 U/L (ref 13–60)
LYMPHOCYTES ABSOLUTE: 1.7 K/UL (ref 1–5.1)
LYMPHOCYTES RELATIVE PERCENT: 21.7 %
MCH RBC QN AUTO: 30.7 PG (ref 26–34)
MCHC RBC AUTO-ENTMCNC: 33.7 G/DL (ref 31–36)
MCV RBC AUTO: 91.2 FL (ref 80–100)
MONOCYTES ABSOLUTE: 0.7 K/UL (ref 0–1.3)
MONOCYTES RELATIVE PERCENT: 9.4 %
NEUTROPHILS ABSOLUTE: 5.1 K/UL (ref 1.7–7.7)
NEUTROPHILS RELATIVE PERCENT: 66.2 %
PDW BLD-RTO: 13.6 % (ref 12.4–15.4)
PLATELET # BLD: 200 K/UL (ref 135–450)
PMV BLD AUTO: 7 FL (ref 5–10.5)
POTASSIUM REFLEX MAGNESIUM: 4 MMOL/L (ref 3.5–5.1)
RBC # BLD: 4.9 M/UL (ref 4.2–5.9)
SODIUM BLD-SCNC: 134 MMOL/L (ref 136–145)
TOTAL PROTEIN: 7.8 G/DL (ref 6.4–8.2)
TROPONIN: <0.01 NG/ML
WBC # BLD: 7.7 K/UL (ref 4–11)

## 2022-09-21 PROCEDURE — 85025 COMPLETE CBC W/AUTO DIFF WBC: CPT

## 2022-09-21 PROCEDURE — 99285 EMERGENCY DEPT VISIT HI MDM: CPT

## 2022-09-21 PROCEDURE — 84484 ASSAY OF TROPONIN QUANT: CPT

## 2022-09-21 PROCEDURE — 71046 X-RAY EXAM CHEST 2 VIEWS: CPT

## 2022-09-21 PROCEDURE — 36415 COLL VENOUS BLD VENIPUNCTURE: CPT

## 2022-09-21 PROCEDURE — 83690 ASSAY OF LIPASE: CPT

## 2022-09-21 PROCEDURE — 93005 ELECTROCARDIOGRAM TRACING: CPT | Performed by: EMERGENCY MEDICINE

## 2022-09-21 PROCEDURE — 80053 COMPREHEN METABOLIC PANEL: CPT

## 2022-09-21 ASSESSMENT — PAIN DESCRIPTION - FREQUENCY: FREQUENCY: OTHER (COMMENT)

## 2022-09-21 ASSESSMENT — PAIN DESCRIPTION - ORIENTATION: ORIENTATION: MID

## 2022-09-21 ASSESSMENT — PAIN - FUNCTIONAL ASSESSMENT
PAIN_FUNCTIONAL_ASSESSMENT: NONE - DENIES PAIN
PAIN_FUNCTIONAL_ASSESSMENT: NONE - DENIES PAIN
PAIN_FUNCTIONAL_ASSESSMENT: ACTIVITIES ARE NOT PREVENTED

## 2022-09-21 ASSESSMENT — PAIN DESCRIPTION - ONSET: ONSET: SUDDEN

## 2022-09-21 ASSESSMENT — PAIN DESCRIPTION - DESCRIPTORS: DESCRIPTORS: PATIENT UNABLE TO DESCRIBE

## 2022-09-21 ASSESSMENT — PAIN DESCRIPTION - LOCATION: LOCATION: CHEST

## 2022-09-21 ASSESSMENT — PAIN DESCRIPTION - PAIN TYPE: TYPE: ACUTE PAIN

## 2022-09-22 LAB
EKG ATRIAL RATE: 55 BPM
EKG DIAGNOSIS: NORMAL
EKG P AXIS: 36 DEGREES
EKG P-R INTERVAL: 248 MS
EKG Q-T INTERVAL: 438 MS
EKG QRS DURATION: 90 MS
EKG QTC CALCULATION (BAZETT): 419 MS
EKG R AXIS: -27 DEGREES
EKG T AXIS: 1 DEGREES
EKG VENTRICULAR RATE: 55 BPM

## 2022-09-22 PROCEDURE — 93010 ELECTROCARDIOGRAM REPORT: CPT | Performed by: INTERNAL MEDICINE

## 2022-09-22 ASSESSMENT — ENCOUNTER SYMPTOMS
SHORTNESS OF BREATH: 0
VOMITING: 1
COUGH: 0
NAUSEA: 1
ABDOMINAL PAIN: 0

## 2022-09-22 NOTE — ED PROVIDER NOTES
Emergency Department Provider Note  Location: [facility] EMERGENCY DEPARTMENT  9/21/2022     Patient Identification  Bunny Nicholson is a [de-identified] y.o. male    Chief Complaint  Swallowed Foreign Body (Patient states he had difficulty swallowing around 515 tonight. At the same time he developed a constricted feeling and mid sternal chest pain and vomited several times producing clear liquid. His blood pressure was 208/107. \" I began to take deep breaths to keep oxygenated incase I was having a heart attack, then I drove myself here\". )      Mode of Arrival  private car    HPI  (History provided by patient)  This is a [de-identified] y.o. male with a PMH significant for HTN  presented today for chest pain. Patient was eating chicken and rice around 5:15pm. After 1-2 bites, he suddenly developed substernal chest pain. He thought maybe food was stuck because he has history of GERD that he self medicate with apple cider vinegar as needed. Patient states the pain was very intense but did not radiate. He took his own BP using a wrist BP machine and noticed his BP was elevated at 208/107. He tried a sip of water but he vomited clear mucous. He tried again and vomited again. Then, pain started to subside and completely resoled within 15 minutes. He talked to his wife about it and decided to come to the ER to [facility] sure it was not a heart attack. \"    ROS  Review of Systems   Constitutional:  Negative for chills and fever. HENT:  Negative for congestion. Eyes:  Negative for visual disturbance. Respiratory:  Negative for cough and shortness of breath. Cardiovascular:  Positive for chest pain (resolved). Gastrointestinal:  Positive for nausea and vomiting. Negative for abdominal pain. Genitourinary:  Negative for dysuria and frequency. Musculoskeletal:  Negative for neck pain. Skin:  Negative for rash. Neurological:  Negative for syncope and speech difficulty. Psychiatric/Behavioral:  Negative for confusion.        I have reviewed the following nursing documentation:  Allergies: No Known Allergies    Past medical history:  has a past medical history of Hypertension and Prolonged emergence from general anesthesia. Past surgical history:  has a past surgical history that includes Colonoscopy; Colonoscopy (10/24/2013); Total knee arthroplasty (Left, 10/14/2020); and joint replacement. Home medications:   Prior to Admission medications    Medication Sig Start Date End Date Taking? Authorizing Provider   diclofenac (VOLTAREN) 75 MG EC tablet Take 1 tablet by mouth 2 times daily (with meals) 5/25/21   Torin Spicer MD   celecoxib (CELEBREX) 100 MG capsule Take 1 capsule by mouth 2 times daily 11/10/20   Torin Spicer MD   docusate sodium (COLACE) 100 MG capsule Take 1 capsule by mouth 2 times daily 10/18/20   Ramonita Lao PA-C   senna-docusate (PERICOLACE) 8.6-50 MG per tablet Take 2 tablets by mouth daily as needed for Constipation 10/14/20   Guillermo James MD   ondansetron (ZOFRAN) 4 MG tablet Take 1 tablet by mouth every 8 hours as needed for Nausea or Vomiting 10/14/20   Guillermo James MD   benazepril (LOTENSIN) 10 MG tablet Take 10 mg by mouth daily. Historical Provider, MD   amLODIPine (NORVASC) 10 MG tablet Take 10 mg by mouth daily. Historical Provider, MD       Social history:  reports that he has never smoked. He has never used smokeless tobacco. He reports current alcohol use of about 3.0 standard drinks per week. He reports that he does not use drugs. Family history:  No family history on file. Exam  ED Triage Vitals [09/21/22 1940]   BP Temp Temp Source Heart Rate Resp SpO2 Height Weight   (!) 148/77 98.1 °F (36.7 °C) Oral 60 10 96 % 6' (1.829 m) 193 lb (87.5 kg)   Physical Exam  Vitals and nursing note reviewed. Constitutional:       General: He is not in acute distress. Appearance: He is well-developed. He is not diaphoretic. HENT:      Head: Normocephalic and atraumatic.    Eyes:      General: No scleral icterus. Right eye: No discharge. Left eye: No discharge. Conjunctiva/sclera: Conjunctivae normal.      Pupils: Pupils are equal, round, and reactive to light. Neck:      Trachea: No tracheal deviation. Cardiovascular:      Rate and Rhythm: Normal rate and regular rhythm. Heart sounds: Normal heart sounds. No murmur heard. Pulmonary:      Effort: Pulmonary effort is normal. No respiratory distress. Breath sounds: Normal breath sounds. No stridor. No wheezing. Abdominal:      General: Bowel sounds are normal. There is no distension. Palpations: Abdomen is soft. Tenderness: There is no abdominal tenderness. There is no guarding or rebound. Musculoskeletal:         General: No deformity. Cervical back: Neck supple. Right lower leg: No edema. Left lower leg: No edema. Skin:     General: Skin is warm and dry. Findings: No erythema or rash. Neurological:      Mental Status: He is alert and oriented to person, place, and time. Cranial Nerves: No dysarthria or facial asymmetry. Sensory: No sensory deficit. Motor: No weakness. Psychiatric:         Mood and Affect: Mood normal.         Behavior: Behavior normal.       MDM/ED Course  EKG  The Ekg interpreted by me in the absence of a cardiologist shows. sinus tachycardia, rate=55     Axis is   Left axis deviation  QTc is  normal  Intervals and Durations are unremarkable. No specific ST-T wave changes appreciated. No evidence of acute ischemia. No significant change from prior EKG dated 10/14/2020      Radiology  XR CHEST (2 VW)    Result Date: 9/21/2022  EXAMINATION: TWO XRAY VIEWS OF THE CHEST 9/21/2022 8:42 pm COMPARISON: 10/18/2020 HISTORY: ORDERING SYSTEM PROVIDED HISTORY: chest pain, vomiting, SOB TECHNOLOGIST PROVIDED HISTORY: Reason for exam:->chest pain, vomiting, SOB Reason for Exam: swallowed fb FINDINGS: Normal cardiomediastinal silhouette.   No acute airspace disease, pleural effusion, or pneumothorax. No radiopaque foreign body seen. No acute abnormality. Labs  Results for orders placed or performed during the hospital encounter of 09/21/22   CBC with Auto Differential   Result Value Ref Range    WBC 7.7 4.0 - 11.0 K/uL    RBC 4.90 4.20 - 5.90 M/uL    Hemoglobin 15.1 13.5 - 17.5 g/dL    Hematocrit 44.7 40.5 - 52.5 %    MCV 91.2 80.0 - 100.0 fL    MCH 30.7 26.0 - 34.0 pg    MCHC 33.7 31.0 - 36.0 g/dL    RDW 13.6 12.4 - 15.4 %    Platelets 116 360 - 937 K/uL    MPV 7.0 5.0 - 10.5 fL    Neutrophils % 66.2 %    Lymphocytes % 21.7 %    Monocytes % 9.4 %    Eosinophils % 2.0 %    Basophils % 0.7 %    Neutrophils Absolute 5.1 1.7 - 7.7 K/uL    Lymphocytes Absolute 1.7 1.0 - 5.1 K/uL    Monocytes Absolute 0.7 0.0 - 1.3 K/uL    Eosinophils Absolute 0.2 0.0 - 0.6 K/uL    Basophils Absolute 0.1 0.0 - 0.2 K/uL   CMP w/ Reflex to MG   Result Value Ref Range    Sodium 134 (L) 136 - 145 mmol/L    Potassium reflex Magnesium 4.0 3.5 - 5.1 mmol/L    Chloride 101 99 - 110 mmol/L    CO2 22 21 - 32 mmol/L    Anion Gap 11 3 - 16    Glucose 102 (H) 70 - 99 mg/dL    BUN 16 7 - 20 mg/dL    Creatinine 0.9 0.8 - 1.3 mg/dL    GFR Non-African American >60 >60    GFR African American >60 >60    Calcium 9.4 8.3 - 10.6 mg/dL    Total Protein 7.8 6.4 - 8.2 g/dL    Albumin 4.3 3.4 - 5.0 g/dL    Albumin/Globulin Ratio 1.2 1.1 - 2.2    Total Bilirubin 0.4 0.0 - 1.0 mg/dL    Alkaline Phosphatase 64 40 - 129 U/L    ALT 20 10 - 40 U/L    AST 23 15 - 37 U/L   Lipase   Result Value Ref Range    Lipase 32.0 13.0 - 60.0 U/L   Troponin   Result Value Ref Range    Troponin <0.01 <0.01 ng/mL       - Patient seen and evaluated in room 2.  [de-identified] y.o. male presented for sudden onset of chest pain while eating chicken and rice and the pain resolved 15 minutes later. He has been pain free since.  Exam unremarkable and pain not reproducible on exam.  Patient has also been pain-free since the pain and resolved. Initial EKG showed sinus bradycardia but no acute ST segment changes. Troponin <0.01. Chest x-ray did not show acute cardiopulmonary disease. No anemia. No elevated white count. No renal failure. Lipase normal.  LFT normal.    HEART SCORE:  History: +1 for moderate suspicion  EKG: +0 for normal EKG   Age: +4 for age >65 years  Risk factors (includes HLD, HTN, DM, tobacco use, obesity, and +FHx): +1 for 1-2 risk factors  Initial troponin: +0 for negative troponin    Heart score: 4. This falls under the following category: Score of 4-6, which indicates low/moderate risk for major adverse cardiac event and supports observation with repeated troponins and/or non-invasive testing     - I discussed the results with patient, including HEART score and what that means. I recommended admission for further evaluation but patient declined. Patient was clearly able to verbalize that he understood the risks. We also had a specific discussion that on any given day, he has a heart score of 3 due to age and RF. Patient  has not had any recent cardiac evaluation. He states he would rather follow-up with his PCP to get outpatient workup. - Trop was drawn > 3 hours after initial onset of pain. Patient has been pain free and would prefer outpatient follow-up instead of spending more time in the ED for repeat troponin.   - Return precautions also discussed. patient verbalized understanding of care plan and agreed to follow-up with PCP as advised. - Is this patient to be included in the SEP-1 Core Measure due to severe sepsis or septic shock? No   Exclusion criteria - the patient is NOT to be included for SEP-1 Core Measure due to: Infection is not suspected    Clinical Impression:  1. Chest pain, unspecified type          Disposition:  Discharge to home in good condition. Blood pressure (!) 142/74, pulse 56, temperature 98.1 °F (36.7 °C), temperature source Oral, resp.  rate 17, height 6' (1.829 m), weight 193 lb (87.5 kg), SpO2 95 %. Disposition referral (if applicable):  Yonathan Kumar MD  . [address]  [phone]    Schedule an appointment as soon as possible for a visit in 3 days      [place] [id number]. [facility] Emergency Department  [address]  [phone]    As needed, If symptoms worsen    This chart was generated in part by using Dragon Dictation system and may contain errors related to that system including errors in grammar, punctuation, and spelling, as well as words and phrases that may be inappropriate. If there are any questions or concerns please feel free to contact the dictating provider for clarification.      Rosalind Alonzo MD  [address] [facility] Елена Tyler MD  09/22/22 2732

## 2022-09-22 NOTE — ED NOTES
Patient able to tolerate drinking approx 8 oz of ice water with no difficulties, or vomiting.       José Luis Pal RN  09/21/22 2053

## 2024-11-19 ENCOUNTER — HOSPITAL ENCOUNTER (OUTPATIENT)
Dept: ULTRASOUND IMAGING | Age: 84
Discharge: HOME | End: 2024-11-19
Attending: UROLOGY
Payer: MEDICARE

## 2024-11-19 DIAGNOSIS — N50.89 CHYLOCELE OF TUNICA VAGINALIS: ICD-10-CM

## 2024-11-19 PROCEDURE — 76870 US EXAM SCROTUM: CPT

## 2025-03-27 ENCOUNTER — OFFICE VISIT (OUTPATIENT)
Dept: ORTHOPEDIC SURGERY | Age: 85
End: 2025-03-27
Payer: MEDICARE

## 2025-03-27 VITALS — WEIGHT: 193 LBS | HEIGHT: 72 IN | BODY MASS INDEX: 26.14 KG/M2

## 2025-03-27 DIAGNOSIS — Z96.652 S/P TOTAL KNEE ARTHROPLASTY, LEFT: ICD-10-CM

## 2025-03-27 DIAGNOSIS — M17.12 PRIMARY OSTEOARTHRITIS OF LEFT KNEE: ICD-10-CM

## 2025-03-27 DIAGNOSIS — M25.562 ACUTE PAIN OF LEFT KNEE: ICD-10-CM

## 2025-03-27 DIAGNOSIS — M17.11 PRIMARY OSTEOARTHRITIS OF RIGHT KNEE: Primary | ICD-10-CM

## 2025-03-27 PROCEDURE — 99203 OFFICE O/P NEW LOW 30 MIN: CPT | Performed by: ORTHOPAEDIC SURGERY

## 2025-03-27 PROCEDURE — 1159F MED LIST DOCD IN RCRD: CPT | Performed by: ORTHOPAEDIC SURGERY

## 2025-03-27 PROCEDURE — 1123F ACP DISCUSS/DSCN MKR DOCD: CPT | Performed by: ORTHOPAEDIC SURGERY

## 2025-03-27 RX ORDER — CELECOXIB 100 MG/1
100 CAPSULE ORAL DAILY
Qty: 14 CAPSULE | Refills: 0 | Status: SHIPPED | OUTPATIENT
Start: 2025-03-27 | End: 2025-04-10

## 2025-03-27 NOTE — PROGRESS NOTES
accurate.    Supervising Physician Attestation:  I, Dr. Emmanuel Delaney, personally performed the services described in this documentation as above, and it is both accurate and complete and I agree with all pertinent clinical information.  I personally interviewed the patient and performed a physical examination and medical decision making. I discussed the patient's condition and treatment options and have  reviewed and agree with the past medical, family and social history unless otherwise noted. All of the patient's questions were answered. I personally supervised the Orthopedic and Sportsmedicine Fellow when when noted in the evaluation and treatment plan of the patient.      Board Certified Orthopaedic Surgeon   [facility]  President and Medical Director  [facility] [employer]  Professor of Orthopedics Emeritus, Department of Orthopedic Surgery  [facility]

## (undated) DEVICE — FLUID TRAP FOR MINIVAC ES EQUIP FLD TRAP

## (undated) DEVICE — STERILE POLYISOPRENE POWDER-FREE SURGICAL GLOVES WITH EMOLLIENT COATING: Brand: PROTEXIS

## (undated) DEVICE — VISIONAIRE ADAPTIVE GUIDE KIT                                    JOURNEY II: Brand: VISIONAIRE

## (undated) DEVICE — SUTURE MCRYL SZ 2-0 L18IN ABSRB VLT L36MM CT-1 1/2 CIR Y739D

## (undated) DEVICE — RIMMED SPEED PIN 45MM STERILE

## (undated) DEVICE — SOLUTION IV 100ML 0.9% SOD CHL PH5 CONTAIN DEHP VIAFLX QP

## (undated) DEVICE — JEWISH HOSPITAL TURNOVER KIT: Brand: MEDLINE INDUSTRIES, INC.

## (undated) DEVICE — SST TWIST DRILL, STANDARD, 3.2MM DIA. X 127MM: Brand: MICROAIRE®

## (undated) DEVICE — ORTHO PRE OP PACK: Brand: MEDLINE INDUSTRIES, INC.

## (undated) DEVICE — GARMENT,MEDLINE,DVT,INT,CALF,MED, GEN2: Brand: MEDLINE

## (undated) DEVICE — STRIP,CLOSURE,WOUND,MEDI-STRIP,1/2X4: Brand: MEDLINE

## (undated) DEVICE — GOWN,SIRUS,POLYRNF,BRTHSLV,XL,30/CS: Brand: MEDLINE

## (undated) DEVICE — SUTURE MCRYL SZ 3-0 L27IN ABSRB UD L26MM SH 1/2 CIR Y416H

## (undated) DEVICE — UNDERGLOVE SURG SZ 8.5 FNGR THK0.21MIL GRN LTX BEAD CUF

## (undated) DEVICE — YANKAUER,OPEN TIP,W/O VENT,STERILE: Brand: MEDLINE INDUSTRIES, INC.

## (undated) DEVICE — SUTURE MCRYL SZ 0 L18IN ABSRB VLT L36MM CT-1 1/2 CIR Y740D

## (undated) DEVICE — SURE SET-DOUBLE BASIN-LF: Brand: MEDLINE INDUSTRIES, INC.

## (undated) DEVICE — DUAL CUT SAGITTAL BLADE

## (undated) DEVICE — SOLUTION IV 1000ML LAC RINGERS PH 6.5 INJ USP VIAFLX PLAS

## (undated) DEVICE — SST BUR, WIRE PASS DRILL, 2 FLUTES, MED., 2MM DIA.: Brand: MICROAIRE®

## (undated) DEVICE — HIGH FLOW TIP

## (undated) DEVICE — COUNTER NDL 40 COUNT HLD 70 NUM FOAM BLK SGL MAG W BLDE REMV

## (undated) DEVICE — COVER LT HNDL BLU PLAS

## (undated) DEVICE — PLATE ES AD W 9FT CRD 2

## (undated) DEVICE — SYRINGE MED 10ML LUERLOCK TIP W/O SFTY DISP

## (undated) DEVICE — INTENDED FOR TISSUE SEPARATION, AND OTHER PROCEDURES THAT REQUIRE A SHARP SURGICAL BLADE TO PUNCTURE OR CUT.: Brand: BARD-PARKER ® CARBON RIB-BACK BLADES

## (undated) DEVICE — SUTURE MCRYL SZ 4-0 L27IN ABSRB UD L19MM PS-2 1/2 CIR PRIM Y426H

## (undated) DEVICE — SOLUTION IV IRRIG WATER 1000ML POUR BRL 2F7114

## (undated) DEVICE — SOLUTION IV 1000ML 0.9% SOD CHL

## (undated) DEVICE — PAD,ABDOMINAL,5"X9",ST,LF,25/BX: Brand: MEDLINE INDUSTRIES, INC.

## (undated) DEVICE — GLOVE SURG SZ 8.5 L11.85IN FNGR THK9.8MIL STRW LTX POLYMER

## (undated) DEVICE — 3M™ COBAN™ NL STERILE NON-LATEX SELF-ADHERENT WRAP, 2086S, 6 IN X 5 YD (15 CM X 4,5 M), 12 ROLLS/CASE: Brand: 3M™ COBAN™

## (undated) DEVICE — SPONGE,LAP,18"X18",DLX,XR,ST,5/PK,40/PK: Brand: MEDLINE

## (undated) DEVICE — SUTURE STRATAFIX SPRL SZ 1 L14IN ABSRB VLT L48CM CTX 1/2 SXPD2B405

## (undated) DEVICE — PENCIL ES ULT VAC W TELSCP NOSE EZ CLN BLDE 10FT

## (undated) DEVICE — SUTURE ETHBND EXCEL SZ 0 L18IN NONABSORBABLE GRN L36MM CT-1 CX21D

## (undated) DEVICE — 450 ML BOTTLE OF 0.05% CHLORHEXIDINE GLUCONATE IN 99.95% STERILE WATER FOR IRRIGATION, USP AND APPLICATOR.: Brand: IRRISEPT ANTIMICROBIAL WOUND LAVAGE

## (undated) DEVICE — SYRINGE 60ML IRRIG PISTON TOMEY

## (undated) DEVICE — PACK PROCEDURE SURG TOTAL KNEE

## (undated) DEVICE — DECANTER BAG 9": Brand: MEDLINE INDUSTRIES, INC.

## (undated) DEVICE — 3 BONE CEMENT MIXER: Brand: MIXEVAC

## (undated) DEVICE — HANDPIECE SUCTION TUBING INTERPULSE 10FT

## (undated) DEVICE — SYRINGE CATH TIP 50ML

## (undated) DEVICE — APPLICATOR MEDICATED 26 CC SOLUTION HI LT ORNG CHLORAPREP

## (undated) DEVICE — Z DISCONTINUED PER MEDLINE USE 2741943 DRESSING AQUACEL 10 IN ALG W9XL25CM SIL CVR WTRPRF VIR BACT BARR ANTIMIC

## (undated) DEVICE — GLOVE SURG SZ 7 CRM LTX FREE POLYISOPRENE POLYMER BEAD ANTI